# Patient Record
Sex: MALE | Race: OTHER | HISPANIC OR LATINO | Employment: OTHER | URBAN - METROPOLITAN AREA
[De-identification: names, ages, dates, MRNs, and addresses within clinical notes are randomized per-mention and may not be internally consistent; named-entity substitution may affect disease eponyms.]

---

## 2020-08-14 ENCOUNTER — APPOINTMENT (EMERGENCY)
Dept: RADIOLOGY | Facility: HOSPITAL | Age: 85
DRG: 378 | End: 2020-08-14
Payer: MEDICARE

## 2020-08-14 ENCOUNTER — APPOINTMENT (INPATIENT)
Dept: RADIOLOGY | Facility: HOSPITAL | Age: 85
DRG: 378 | End: 2020-08-14
Payer: MEDICARE

## 2020-08-14 ENCOUNTER — HOSPITAL ENCOUNTER (INPATIENT)
Facility: HOSPITAL | Age: 85
LOS: 5 days | Discharge: HOME | DRG: 378 | End: 2020-08-19
Attending: EMERGENCY MEDICINE | Admitting: INTERNAL MEDICINE
Payer: MEDICARE

## 2020-08-14 DIAGNOSIS — R53.83 FATIGUE: ICD-10-CM

## 2020-08-14 DIAGNOSIS — R53.1 WEAKNESS: ICD-10-CM

## 2020-08-14 DIAGNOSIS — R91.8 PULMONARY NODULES: ICD-10-CM

## 2020-08-14 DIAGNOSIS — D64.9 ANEMIA: Primary | ICD-10-CM

## 2020-08-14 DIAGNOSIS — K92.1 MELENA: ICD-10-CM

## 2020-08-14 DIAGNOSIS — K59.00 CONSTIPATION, UNSPECIFIED CONSTIPATION TYPE: ICD-10-CM

## 2020-08-14 DIAGNOSIS — I35.0 SEVERE AORTIC STENOSIS: ICD-10-CM

## 2020-08-14 DIAGNOSIS — N18.9 CKD (CHRONIC KIDNEY DISEASE): ICD-10-CM

## 2020-08-14 DIAGNOSIS — R63.4 WEIGHT LOSS: ICD-10-CM

## 2020-08-14 DIAGNOSIS — Z85.46 HISTORY OF PROSTATE CANCER: ICD-10-CM

## 2020-08-14 PROBLEM — Z98.890 HISTORY OF AAA (ABDOMINAL AORTIC ANEURYSM) REPAIR: Status: ACTIVE | Noted: 2020-08-14

## 2020-08-14 PROBLEM — R01.1 MURMUR, CARDIAC: Status: ACTIVE | Noted: 2020-08-14

## 2020-08-14 PROBLEM — E78.5 HLD (HYPERLIPIDEMIA): Status: ACTIVE | Noted: 2020-08-14

## 2020-08-14 PROBLEM — Z43.6 ATTENTION TO NEPHROSTOMY (HCC): Status: ACTIVE | Noted: 2020-08-14

## 2020-08-14 PROBLEM — I10 HTN (HYPERTENSION): Status: ACTIVE | Noted: 2020-08-14

## 2020-08-14 LAB
ABO GROUP BLD: NORMAL
ABO GROUP BLD: NORMAL
ALBUMIN SERPL BCP-MCNC: 3.2 G/DL (ref 3.5–5)
ALP SERPL-CCNC: 196 U/L (ref 46–116)
ALT SERPL W P-5'-P-CCNC: 14 U/L (ref 12–78)
ANION GAP SERPL CALCULATED.3IONS-SCNC: 4 MMOL/L (ref 4–13)
AST SERPL W P-5'-P-CCNC: 35 U/L (ref 5–45)
ATRIAL RATE: 66 BPM
BASOPHILS # BLD AUTO: 0.01 THOUSANDS/ΜL (ref 0–0.1)
BASOPHILS NFR BLD AUTO: 0 % (ref 0–1)
BILIRUB SERPL-MCNC: 0.13 MG/DL (ref 0.2–1)
BLD GP AB SCN SERPL QL: NEGATIVE
BUN SERPL-MCNC: 46 MG/DL (ref 5–25)
CALCIUM SERPL-MCNC: 9.6 MG/DL (ref 8.3–10.1)
CHLORIDE SERPL-SCNC: 110 MMOL/L (ref 100–108)
CK SERPL-CCNC: 73 U/L (ref 39–308)
CO2 SERPL-SCNC: 26 MMOL/L (ref 21–32)
CREAT SERPL-MCNC: 2.21 MG/DL (ref 0.6–1.3)
EOSINOPHIL # BLD AUTO: 0.3 THOUSAND/ΜL (ref 0–0.61)
EOSINOPHIL NFR BLD AUTO: 6 % (ref 0–6)
ERYTHROCYTE [DISTWIDTH] IN BLOOD BY AUTOMATED COUNT: 18.4 % (ref 11.6–15.1)
GFR SERPL CREATININE-BSD FRML MDRD: 26 ML/MIN/1.73SQ M
GLUCOSE SERPL-MCNC: 167 MG/DL (ref 65–140)
HCT VFR BLD AUTO: 19 % (ref 36.5–49.3)
HCT VFR BLD AUTO: 23.3 % (ref 36.5–49.3)
HGB BLD-MCNC: 5.1 G/DL (ref 12–17)
HGB BLD-MCNC: 7.1 G/DL (ref 12–17)
IMM GRANULOCYTES # BLD AUTO: 0.05 THOUSAND/UL (ref 0–0.2)
IMM GRANULOCYTES NFR BLD AUTO: 1 % (ref 0–2)
LYMPHOCYTES # BLD AUTO: 0.79 THOUSANDS/ΜL (ref 0.6–4.47)
LYMPHOCYTES NFR BLD AUTO: 15 % (ref 14–44)
MCH RBC QN AUTO: 19.2 PG (ref 26.8–34.3)
MCHC RBC AUTO-ENTMCNC: 26.8 G/DL (ref 31.4–37.4)
MCV RBC AUTO: 72 FL (ref 82–98)
MONOCYTES # BLD AUTO: 0.44 THOUSAND/ΜL (ref 0.17–1.22)
MONOCYTES NFR BLD AUTO: 9 % (ref 4–12)
NEUTROPHILS # BLD AUTO: 3.61 THOUSANDS/ΜL (ref 1.85–7.62)
NEUTS SEG NFR BLD AUTO: 69 % (ref 43–75)
NRBC BLD AUTO-RTO: 0 /100 WBCS
NT-PROBNP SERPL-MCNC: 1915 PG/ML
P AXIS: 76 DEGREES
PLATELET # BLD AUTO: 219 THOUSANDS/UL (ref 149–390)
PMV BLD AUTO: 9.5 FL (ref 8.9–12.7)
POTASSIUM SERPL-SCNC: 4.8 MMOL/L (ref 3.5–5.3)
PR INTERVAL: 158 MS
PROT SERPL-MCNC: 7.8 G/DL (ref 6.4–8.2)
QRS AXIS: -14 DEGREES
QRSD INTERVAL: 98 MS
QT INTERVAL: 386 MS
QTC INTERVAL: 404 MS
RBC # BLD AUTO: 2.65 MILLION/UL (ref 3.88–5.62)
RH BLD: POSITIVE
RH BLD: POSITIVE
SODIUM SERPL-SCNC: 140 MMOL/L (ref 136–145)
SPECIMEN EXPIRATION DATE: NORMAL
T WAVE AXIS: 62 DEGREES
TROPONIN I SERPL-MCNC: <0.02 NG/ML
TSH SERPL DL<=0.05 MIU/L-ACNC: 2.41 UIU/ML (ref 0.36–3.74)
VENTRICULAR RATE: 66 BPM
WBC # BLD AUTO: 5.2 THOUSAND/UL (ref 4.31–10.16)

## 2020-08-14 PROCEDURE — 30233N1 TRANSFUSION OF NONAUTOLOGOUS RED BLOOD CELLS INTO PERIPHERAL VEIN, PERCUTANEOUS APPROACH: ICD-10-PCS | Performed by: INTERNAL MEDICINE

## 2020-08-14 PROCEDURE — 82550 ASSAY OF CK (CPK): CPT | Performed by: EMERGENCY MEDICINE

## 2020-08-14 PROCEDURE — 84443 ASSAY THYROID STIM HORMONE: CPT | Performed by: EMERGENCY MEDICINE

## 2020-08-14 PROCEDURE — 84165 PROTEIN E-PHORESIS SERUM: CPT | Performed by: PATHOLOGY

## 2020-08-14 PROCEDURE — 99285 EMERGENCY DEPT VISIT HI MDM: CPT | Performed by: EMERGENCY MEDICINE

## 2020-08-14 PROCEDURE — 86900 BLOOD TYPING SEROLOGIC ABO: CPT | Performed by: EMERGENCY MEDICINE

## 2020-08-14 PROCEDURE — 83880 ASSAY OF NATRIURETIC PEPTIDE: CPT | Performed by: EMERGENCY MEDICINE

## 2020-08-14 PROCEDURE — 80053 COMPREHEN METABOLIC PANEL: CPT | Performed by: EMERGENCY MEDICINE

## 2020-08-14 PROCEDURE — P9016 RBC LEUKOCYTES REDUCED: HCPCS

## 2020-08-14 PROCEDURE — 74176 CT ABD & PELVIS W/O CONTRAST: CPT

## 2020-08-14 PROCEDURE — 84484 ASSAY OF TROPONIN QUANT: CPT | Performed by: EMERGENCY MEDICINE

## 2020-08-14 PROCEDURE — 86920 COMPATIBILITY TEST SPIN: CPT

## 2020-08-14 PROCEDURE — 86901 BLOOD TYPING SEROLOGIC RH(D): CPT | Performed by: EMERGENCY MEDICINE

## 2020-08-14 PROCEDURE — 36415 COLL VENOUS BLD VENIPUNCTURE: CPT | Performed by: EMERGENCY MEDICINE

## 2020-08-14 PROCEDURE — 85014 HEMATOCRIT: CPT | Performed by: INTERNAL MEDICINE

## 2020-08-14 PROCEDURE — 36430 TRANSFUSION BLD/BLD COMPNT: CPT

## 2020-08-14 PROCEDURE — 85025 COMPLETE CBC W/AUTO DIFF WBC: CPT | Performed by: EMERGENCY MEDICINE

## 2020-08-14 PROCEDURE — 85018 HEMOGLOBIN: CPT | Performed by: INTERNAL MEDICINE

## 2020-08-14 PROCEDURE — 93010 ELECTROCARDIOGRAM REPORT: CPT | Performed by: INTERNAL MEDICINE

## 2020-08-14 PROCEDURE — G1004 CDSM NDSC: HCPCS

## 2020-08-14 PROCEDURE — 99222 1ST HOSP IP/OBS MODERATE 55: CPT | Performed by: INTERNAL MEDICINE

## 2020-08-14 PROCEDURE — 71046 X-RAY EXAM CHEST 2 VIEWS: CPT

## 2020-08-14 PROCEDURE — 71250 CT THORAX DX C-: CPT

## 2020-08-14 PROCEDURE — 93005 ELECTROCARDIOGRAM TRACING: CPT

## 2020-08-14 PROCEDURE — 86850 RBC ANTIBODY SCREEN: CPT | Performed by: EMERGENCY MEDICINE

## 2020-08-14 PROCEDURE — 99285 EMERGENCY DEPT VISIT HI MDM: CPT

## 2020-08-14 RX ORDER — ATORVASTATIN CALCIUM 40 MG/1
40 TABLET, FILM COATED ORAL DAILY
COMMUNITY
End: 2020-09-23 | Stop reason: HOSPADM

## 2020-08-14 RX ORDER — BICALUTAMIDE 50 MG/1
50 TABLET, FILM COATED ORAL DAILY
Status: DISCONTINUED | OUTPATIENT
Start: 2020-08-15 | End: 2020-08-19 | Stop reason: HOSPADM

## 2020-08-14 RX ORDER — BICALUTAMIDE 50 MG/1
50 TABLET, FILM COATED ORAL DAILY
COMMUNITY

## 2020-08-14 RX ORDER — AMLODIPINE BESYLATE 5 MG/1
5 TABLET ORAL DAILY
COMMUNITY

## 2020-08-14 RX ORDER — CALCITRIOL 0.25 UG/1
0.25 CAPSULE, LIQUID FILLED ORAL DAILY
COMMUNITY

## 2020-08-14 RX ORDER — ACETAMINOPHEN 325 MG/1
650 TABLET ORAL EVERY 6 HOURS PRN
Status: DISCONTINUED | OUTPATIENT
Start: 2020-08-14 | End: 2020-08-19 | Stop reason: HOSPADM

## 2020-08-14 RX ORDER — FOLIC ACID/VIT B COMPLEX AND C 0.8 MG
0.8 TABLET ORAL
COMMUNITY

## 2020-08-14 RX ORDER — AMLODIPINE BESYLATE 5 MG/1
5 TABLET ORAL DAILY
Status: DISCONTINUED | OUTPATIENT
Start: 2020-08-15 | End: 2020-08-19 | Stop reason: HOSPADM

## 2020-08-14 RX ORDER — ATORVASTATIN CALCIUM 40 MG/1
40 TABLET, FILM COATED ORAL DAILY
Status: DISCONTINUED | OUTPATIENT
Start: 2020-08-15 | End: 2020-08-19 | Stop reason: HOSPADM

## 2020-08-14 RX ORDER — HEPARIN SODIUM 5000 [USP'U]/ML
5000 INJECTION, SOLUTION INTRAVENOUS; SUBCUTANEOUS EVERY 8 HOURS SCHEDULED
Status: DISCONTINUED | OUTPATIENT
Start: 2020-08-14 | End: 2020-08-14

## 2020-08-14 RX ORDER — DILTIAZEM HYDROCHLORIDE 120 MG/1
120 CAPSULE, COATED, EXTENDED RELEASE ORAL DAILY
COMMUNITY

## 2020-08-14 RX ORDER — DILTIAZEM HYDROCHLORIDE 120 MG/1
120 CAPSULE, COATED, EXTENDED RELEASE ORAL DAILY
Status: DISCONTINUED | OUTPATIENT
Start: 2020-08-15 | End: 2020-08-19 | Stop reason: HOSPADM

## 2020-08-14 RX ADMIN — ACETAMINOPHEN 650 MG: 325 TABLET, FILM COATED ORAL at 21:03

## 2020-08-14 NOTE — H&P
INTERNAL MEDICINE RESIDENCY ADMISSION H&P     Name: Iliana Barnard   Age & Sex: 80 y o  male   MRN: [id number]  Unit/Bed#: [facility] 913-01   Encounter: [id number]  Primary Care Provider: No primary care provider on file  Code Status: Level 3 - DNAR and DNI  Admission Status: INPATIENT   Disposition: Patient requires Med/Surg with Telemetry    Admit to team: SOD Team B     ASSESSMENT/PLAN     Principal Problem:    Anemia  Active Problems:    History of prostate cancer    Chronic kidney disease    Attention to nephrostomy (HCC)    HTN (hypertension)    HLD (hyperlipidemia)    Murmur, cardiac    History of AAA (abdominal aortic aneurysm) repair      History of AAA (abdominal aortic aneurysm) repair  Assessment & Plan  Patient endorsed a history of AAA repair 2 years ago  Will order CTA of the chest abdomen pelvis to evaluate if this is the cause for the anemia, the less likely as patient is hemodynamically stable currently  Continue to monitor    Murmur, cardiac  Assessment & Plan  Patient has cardiac murmur on exam, likely exacerbated by his dehydration, anemia  Will obtain echocardiogram to evaluate  Continue to monitor    HLD (hyperlipidemia)  Assessment & Plan  Stable  Continue Lipitor    HTN (hypertension)  Assessment & Plan  Stable  Continue amlodipine, diltiazem    Attention to nephrostomy [facility])  Assessment & Plan  Patient had nephrostomy tube placed 1 year ago for 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  Patient reports no difficulties with nephrostomy at this time, denies any hematuria  Patient does have urine output through the nephrostomy tube as well as normal urination through his penis at this time  If any issues with nephroscopy developed, would consider consulting Urology  Monitor urine output    Chronic kidney disease  Assessment & Plan  Patient has history of chronic kidney disease, unknown baseline creatinine at this time    Did stated history of temporary dialysis during prior hospitalization however no records are available at this time  Creatinine currently 2 21 in the setting of anemia  Continue to monitor creatinine, is receiving 2 units of PRBCs  Consider Nephrology consult as an outpatient    History of prostate cancer  Assessment & Plan  History of prostate cancer diagnosed 1 year ago, was following with Oncology at [facility]  No records are currently available at and patient is unsure on how to obtain them  Denies any prior history of chemotherapy, radiation  Had left nephrostomy tube placed due to obstructive uropathy  Chest x-ray suggests infiltrative disease probably secondary to his cancer, will obtain CTA of the chest abdomen pelvis to evaluate  Patient would like to establish care with Oncology in the area, will consult while he is inpatient for further recommendations  Follow up as an outpatient with Oncology    * Anemia  Assessment & Plan  Patient has severe symptomatic anemia on presentation, likely chronic in nature given his history of malignancy a possible infiltrative disease secondary to metastasis  Was transfused 2 times prior, last transfusion 3 months ago  No signs of overt bleeding  Hemoglobin on presentation 5 1  Will transfuse 2 units, recheck hemoglobin after transfusion  Will likely be a chronic issue for this patient given his history, we will consult Oncology for to establish care with patient as he recently relocated to the area  Transfuse if hemoglobin less than 7  Continue to monitor CBC          VTE Pharmacologic Prophylaxis:  Will hold DVT prophylaxis at this time, concern for anemia, can resume after patient has stable hemoglobin  VTE Mechanical Prophylaxis: sequential compression device    CHIEF COMPLAINT     Chief Complaint   Patient presents with    Weakness - Generalized     Per family pt has weakness with ambulation and is no longer able to stand for more than 30 seconds d/t feeling weak and dizzy   Pt has history of prostate cancer  Pt has had similar feeling in the past and needed a blood transfusion      HISTORY OF PRESENT ILLNESS     59-year-old male with past medical history of prostate cancer diagnosed 1 year ago, chronic kidney disease, status post nephrostomy tube on the left side for obstructive uropathy, chronic anemia, hypertension, hyperlipidemia was presented to [facility] for generalized weakness  He has been experiencing symptoms of generalized weakness, shortness of breath on exertion for the past 1 week  He states that this feels similar to her previous admission he had where he had a low hemoglobin level  He was transfused 2 times prior, last time was 3 months ago  He has had a left nephrostomy tube placed 1 year ago for obstructive uropathy, was on temporary dialysis  Does make urine which exits the of the nerve for left nephrostomy tube as well as his penis  Denies any recent hematuria, melena, hematochezia  No other signs of overt bleeding per history  On evaluation in the ED patient was hemodynamically stable, his hemoglobin is noted to be 5 1  He was started on a transfusion of 2 units of PRBCs  He has is admitted for symptomatic anemia  REVIEW OF SYSTEMS     Review of Systems   Constitutional: Positive for fatigue  Negative for chills and fever  HENT: Negative for congestion and hearing loss  Respiratory: Positive for shortness of breath  Negative for apnea and cough  Cardiovascular: Negative for chest pain, palpitations and leg swelling  Gastrointestinal: Negative for abdominal pain, blood in stool, constipation, diarrhea, nausea and vomiting  Genitourinary: Negative for dysuria and hematuria  Musculoskeletal: Negative for arthralgias and back pain  Neurological: Negative for dizziness and headaches  Psychiatric/Behavioral: Negative for agitation and confusion  All other systems reviewed were negative    OBJECTIVE     Vitals:    08/14/20 1346 08/14/20 1428 20 1430 20 1454   BP: 153/70 136/65 136/65 148/67   BP Location: Right arm   Right arm   Pulse: 60 58 60 58   Resp:    Temp:  (!) 96 6 °F (35 9 °C)  97 5 °F (36 4 °C)   TempSrc:    Oral   SpO2: 99%  100% 100%   Weight:          Temperature:   Temp (24hrs), Av 2 °F (36 2 °C), Min:96 6 °F (35 9 °C), Max:97 6 °F (36 4 °C)    Temperature: 97 5 °F (36 4 °C)  Intake & Output:  I/O     None        Weights:   IBW: -88 kg    There is no height or weight on file to calculate BMI  Weight (last 2 days)     Date/Time   Weight    20 1035   59 (130)            Physical Exam  Constitutional:       General: He is not in acute distress  Appearance: He is cachectic  He is ill-appearing  HENT:      Head: Normocephalic and atraumatic  Mouth/Throat:      Mouth: Mucous membranes are dry  Eyes:      Extraocular Movements: Extraocular movements intact  Conjunctiva/sclera: Conjunctivae normal       Pupils: Pupils are equal, round, and reactive to light  Cardiovascular:      Rate and Rhythm: Normal rate and regular rhythm  Pulses: Normal pulses  Heart sounds: Murmur present  No friction rub  No gallop  Pulmonary:      Effort: Pulmonary effort is normal  No respiratory distress  Breath sounds: Normal breath sounds  No wheezing or rales  Abdominal:      General: Abdomen is flat  Bowel sounds are normal  There is no distension  Palpations: Abdomen is soft  Tenderness: There is no abdominal tenderness  There is no guarding  Musculoskeletal:      Right lower leg: No edema  Left lower leg: No edema  Skin:     General: Skin is warm and dry  Neurological:      General: No focal deficit present  Mental Status: He is alert and oriented to person, place, and time     Psychiatric:         Mood and Affect: Mood normal          Behavior: Behavior normal         PAST MEDICAL HISTORY     Past Medical History:   Diagnosis Date    Kidney disease     Prostate cancer ([facility] [address] )      PAST SURGICAL HISTORY   History reviewed  No pertinent surgical history  SOCIAL & FAMILY HISTORY     Social History     Substance and Sexual Activity   Alcohol Use Never    Frequency: Never     Social History     Substance and Sexual Activity   Drug Use Never     Social History     Tobacco Use   Smoking Status Never Smoker   Smokeless Tobacco Never Used     History reviewed  No pertinent family history  LABORATORY DATA     Labs: I have personally reviewed pertinent reports  Results from last 7 days   Lab Units 08/14/20  1104   WBC Thousand/uL 5 20   HEMOGLOBIN g/dL 5 1*   HEMATOCRIT % 19 0*   PLATELETS Thousands/uL 219   NEUTROS PCT % 69   MONOS PCT % 9      Results from last 7 days   Lab Units 08/14/20  1104   POTASSIUM mmol/L 4 8   CHLORIDE mmol/L 110*   CO2 mmol/L 26   BUN mg/dL 46*   CREATININE mg/dL 2 21*   CALCIUM mg/dL 9 6   ALK PHOS U/L 196*   ALT U/L 14   AST U/L 35                      Results from last 7 days   Lab Units 08/14/20  1104   TROPONIN I ng/mL <0 02     Micro:  No results found for: Nela Speaks, WOUNDCULT, SPUTUMCULTUR  IMAGING & DIAGNOSTIC TESTS     Imaging: I have personally reviewed pertinent reports  Xr Chest 2 Views    Result Date: 8/14/2020  Impression: Numerous nodular density projecting over the lung fields, which could be pulmonary nodules or pleural plaques  There are also multiple suspected sclerotic bone lesions, for example in the inferior border of the right scapula  Recommend chest CT for further evaluation  The study was marked in EPIC for significant notification  Workstation performed: [id number]     EKG, Pathology, and Other Studies: I have personally reviewed pertinent reports  ALLERGIES   No Known Allergies  MEDICATIONS PRIOR TO ARRIVAL     Prior to Admission medications    Medication Sig Start Date End Date Taking?  Authorizing Provider   amLODIPine (NORVASC) 5 mg tablet Take 5 mg by mouth daily   Yes Historical Provider, MD atorvastatin (LIPITOR) 40 mg tablet Take 40 mg by mouth daily   Yes Historical Provider, MD moreno complex-C-folic acid (NEPHRO-YONIS) 0 8 mg tablet Take 0 8 mg by mouth daily with dinner   Yes Historical Provider, MD   bicalutamide (CASODEX) 50 mg tablet Take 50 mg by mouth daily   Yes Historical Provider, MD   calcitriol (ROCALTROL) 0 25 mcg capsule Take 0 25 mcg by mouth daily   Yes Historical Provider, MD   diltiazem (dilTIAZem CD) 120 mg 24 hr capsule Take 120 mg by mouth daily   Yes Historical Provider, MD     MEDICATIONS ADMINISTERED IN LAST 24 HOURS     CURRENT MEDICATIONS     Current Facility-Administered Medications   Medication Dose Route Frequency Provider Last Rate    [START ON 8/15/2020] amLODIPine  5 mg Oral Daily Robert G Chirayath, DO      [START ON 8/15/2020] atorvastatin  40 mg Oral Daily Robert G Chirayath, DO      [START ON 8/15/2020] bicalutamide  50 mg Oral Daily Robert G Chirayath, DO      [START ON 8/15/2020] diltiazem  120 mg Oral Daily Robert G Chirayath, DO      heparin (porcine)  5,000 Units Subcutaneous Q8H [facility] [address], DO               Admission Time  I spent 30 minutes admitting the patient  This involved direct patient contact where I performed a full history and physical, reviewing previous records, and reviewing laboratory and other diagnostic studies  Portions of the record may have been created with voice recognition software  Occasional wrong word or "sound a like" substitutions may have occurred due to the inherent limitations of voice recognition software    Read the chart carefully and recognize, using context, where substitutions have occurred     ==  Terence Malhotra, [facility]  Internal Medicine Residency PGY-2

## 2020-08-14 NOTE — ASSESSMENT & PLAN NOTE
Patient has severe symptomatic anemia on presentation, likely chronic in nature given his history of malignancy a possible infiltrative disease secondary to metastasis  Was transfused 2 times prior, last transfusion 3 months ago  No signs of overt bleeding  Hemoglobin on presentation 5 1  Will transfuse 2 units, recheck hemoglobin after transfusion  Will likely be a chronic issue for this patient given his history, we will consult Oncology for to establish care with patient as he recently relocated to the area    Transfuse if hemoglobin less than 7  Continue to monitor CBC

## 2020-08-14 NOTE — ASSESSMENT & PLAN NOTE
Patient has cardiac murmur on exam, likely exacerbated by his dehydration, anemia  Will obtain echocardiogram to evaluate  Continue to monitor

## 2020-08-14 NOTE — ASSESSMENT & PLAN NOTE
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

## 2020-08-14 NOTE — ASSESSMENT & PLAN NOTE
History of prostate cancer diagnosed 1 year ago, was following with Oncology at [facility]  No records are currently available at and patient is unsure on how to obtain them  Denies any prior history of chemotherapy, radiation  Had left nephrostomy tube placed due to obstructive uropathy  Chest x-ray suggests infiltrative disease probably secondary to his cancer, will obtain CTA of the chest abdomen pelvis to evaluate    Patient would like to establish care with Oncology in the area, will consult while he is inpatient for further recommendations  Follow up as an outpatient with Oncology

## 2020-08-14 NOTE — ED PROVIDER NOTES
History  Chief Complaint   Patient presents with    Weakness - Generalized     Per family pt has weakness with ambulation and is no longer able to stand for more than 30 seconds d/t feeling weak and dizzy  Pt has history of prostate cancer  Pt has had similar feeling in the past and needed a blood transfusion     80year-old male with history of prostate cancer with mets to bone, anemia previously requiring blood transfusion presenting for evaluation of generalized weakness over the past 2 weeks  Patient usually ambulates with a cane, however has had increasing difficulty even getting up to stand given weakness in his legs  He also feels increasingly fatigued  Endorses a significant amount of weight loss over the past 6 months, anywhere from 20-60 lb, he is unsure  Denies any chest pain, shortness of breath, cough, nausea, vomiting, diarrhea  Has no COVID-19 exposures, although did recently move here 2 weeks ago from [place]  Does not take any blood thinners  Denies any bleeding sources, no hematemesis or bloody stools  ROS otherwise negative as below  History provided by:  Patient   used: Yes        Prior to Admission Medications   Prescriptions Last Dose Informant Patient Reported? Taking?    amLODIPine (NORVASC) 5 mg tablet  Family Member Yes Yes   Sig: Take 5 mg by mouth daily   atorvastatin (LIPITOR) 40 mg tablet  Family Member Yes Yes   Sig: Take 40 mg by mouth daily   b complex-C-folic acid (NEPHRO-YONIS) 0 8 mg tablet  Family Member Yes Yes   Sig: Take 0 8 mg by mouth daily with dinner   bicalutamide (CASODEX) 50 mg tablet  Family Member Yes Yes   Sig: Take 50 mg by mouth daily   calcitriol (ROCALTROL) 0 25 mcg capsule  Family Member Yes Yes   Sig: Take 0 25 mcg by mouth daily   diltiazem (dilTIAZem CD) 120 mg 24 hr capsule  Family Member Yes Yes   Sig: Take 120 mg by mouth daily      Facility-Administered Medications: None       Past Medical History:   Diagnosis Date    Kidney disease     Prostate cancer [facility])        History reviewed  No pertinent surgical history  History reviewed  No pertinent family history  I have reviewed and agree with the history as documented  E-Cigarette/Vaping     E-Cigarette/Vaping Substances     Social History     Tobacco Use    Smoking status: Never Smoker    Smokeless tobacco: Never Used   Substance Use Topics    Alcohol use: Never     Frequency: Never    Drug use: Never        Review of Systems   Constitutional: Positive for fatigue and unexpected weight change  Negative for fever  HENT: Negative for congestion and rhinorrhea  Respiratory: Negative for cough, shortness of breath and wheezing  Cardiovascular: Negative for chest pain and palpitations  Gastrointestinal: Negative for diarrhea, nausea and vomiting  Genitourinary: Negative for dysuria and hematuria  Musculoskeletal: Positive for gait problem  Negative for back pain  Skin: Negative for pallor and rash  Neurological: Negative for light-headedness and headaches  Psychiatric/Behavioral: Negative for confusion  The patient is not nervous/anxious  Physical Exam  ED Triage Vitals   Temperature Pulse Respirations Blood Pressure SpO2   08/14/20 1035 08/14/20 1035 08/14/20 1035 08/14/20 1035 08/14/20 1035   97 6 °F (36 4 °C) 73 16 155/68 100 %      Temp src Heart Rate Source Patient Position - Orthostatic VS BP Location FiO2 (%)   -- 08/14/20 1125 08/14/20 1125 08/14/20 1125 --    Monitor Lying Right arm       Pain Score       08/14/20 1035       2             Orthostatic Vital Signs  Vitals:    08/14/20 1330 08/14/20 1346 08/14/20 1428 08/14/20 1430   BP: (!) 176/71 153/70 136/65 136/65   Pulse: 66 60 58 60   Patient Position - Orthostatic VS:  Lying         Physical Exam  Vitals signs and nursing note reviewed  Constitutional:       Appearance: He is not diaphoretic  Comments: Thin, frail appearing   HENT:      Head: Normocephalic        Right Ear: Tympanic membrane normal       Left Ear: Tympanic membrane normal       Nose: Nose normal       Mouth/Throat:      Mouth: Mucous membranes are moist    Eyes:      Comments: Conjunctival pallor   Neck:      Musculoskeletal: Normal range of motion  Cardiovascular:      Rate and Rhythm: Normal rate and regular rhythm  Heart sounds: Murmur (Systolic murmur) present  Pulmonary:      Effort: Pulmonary effort is normal       Breath sounds: Normal breath sounds  No wheezing or rales  Abdominal:      General: Abdomen is flat  There is no distension  Palpations: Abdomen is soft  Tenderness: There is no abdominal tenderness  Musculoskeletal:         General: No swelling  Skin:     General: Skin is warm  Coloration: Skin is pale  Neurological:      General: No focal deficit present  Mental Status: He is alert  Psychiatric:         Mood and Affect: Mood normal          ED Medications  Medications - No data to display    Diagnostic Studies  Results Reviewed     Procedure Component Value Units Date/Time    NT-BNP PRO [[id number]]  (Abnormal) Collected:  08/14/20 1104    Lab Status:  Final result Specimen:  Blood from Arm, Left Updated:  08/14/20 1149     NT-proBNP 1,915 pg/mL     TSH, 3rd generation with Free T4 reflex [[id number]]  (Normal) Collected:  08/14/20 1104    Lab Status:  Final result Specimen:  Blood from Arm, Left Updated:  08/14/20 1138     TSH 3RD GENERATON 2 410 uIU/mL     Narrative:       Patients undergoing fluorescein dye angiography may retain small amounts of fluorescein in the body for 48-72 hours post procedure  Samples containing fluorescein can produce falsely depressed TSH values  If the patient had this procedure,a specimen should be resubmitted post fluorescein clearance        Troponin I [[id number]]  (Normal) Collected:  08/14/20 1104    Lab Status:  Final result Specimen:  Blood from Arm, Left Updated:  08/14/20 1135     Troponin I <0 02 ng/mL     Comprehensive metabolic panel [[id number]]  (Abnormal) Collected:  08/14/20 1104    Lab Status:  Final result Specimen:  Blood from Arm, Left Updated:  08/14/20 1133     Sodium 140 mmol/L      Potassium 4 8 mmol/L      Chloride 110 mmol/L      CO2 26 mmol/L      ANION GAP 4 mmol/L      BUN 46 mg/dL      Creatinine 2 21 mg/dL      Glucose 167 mg/dL      Calcium 9 6 mg/dL      AST 35 U/L      ALT 14 U/L      Alkaline Phosphatase 196 U/L      Total Protein 7 8 g/dL      Albumin 3 2 g/dL      Total Bilirubin 0 13 mg/dL      eGFR 26 ml/min/1 73sq m     Narrative:       National [employer] guidelines for Chronic Kidney Disease (CKD):     Stage 1 with normal or high GFR (GFR > 90 mL/min/1 73 square meters)    Stage 2 Mild CKD (GFR = 60-89 mL/min/1 73 square meters)    Stage 3A Moderate CKD (GFR = 45-59 mL/min/1 73 square meters)    Stage 3B Moderate CKD (GFR = 30-44 mL/min/1 73 square meters)    Stage 4 Severe CKD (GFR = 15-29 mL/min/1 73 square meters)    Stage 5 End Stage CKD (GFR <15 mL/min/1 73 square meters)  Note: GFR calculation is accurate only with a steady state creatinine    CK (with reflex to MB) [[id number]]  (Normal) Collected:  08/14/20 1104    Lab Status:  Final result Specimen:  Blood from Arm, Left Updated:  08/14/20 1133     Total CK 73 U/L     CBC and differential [[id number]]  (Abnormal) Collected:  08/14/20 1104    Lab Status:  Final result Specimen:  Blood from Arm, Left Updated:  08/14/20 1128     WBC 5 20 Thousand/uL      RBC 2 65 Million/uL      Hemoglobin 5 1 g/dL      Hematocrit 19 0 %      MCV 72 fL      MCH 19 2 pg      MCHC 26 8 g/dL      RDW 18 4 %      MPV 9 5 fL      Platelets 562 Thousands/uL      nRBC 0 /100 WBCs      Neutrophils Relative 69 %      Immat GRANS % 1 %      Lymphocytes Relative 15 %      Monocytes Relative 9 %      Eosinophils Relative 6 %      Basophils Relative 0 %      Neutrophils Absolute 3 61 Thousands/µL      Immature Grans Absolute 0 05 Thousand/uL Lymphocytes Absolute 0 79 Thousands/µL      Monocytes Absolute 0 44 Thousand/µL      Eosinophils Absolute 0 30 Thousand/µL      Basophils Absolute 0 01 Thousands/µL                  XR chest 2 views   Final Result by Namrata Schumacher MD (08/14 1321)      Numerous nodular density projecting over the lung fields, which could be pulmonary nodules or pleural plaques  There are also multiple suspected sclerotic bone lesions, for example in the inferior border of the right scapula  Recommend chest CT for further evaluation  The study was marked in EPIC for significant notification  Workstation performed: [id number]               Procedures  ECG 12 Lead Documentation Only    Date/Time: 8/14/2020 2:05 PM  Performed by: Shari Anderson MD  Authorized by: Shari Anderson MD     Indications / Diagnosis:  Anemia  Patient location:  ED  Previous ECG:     Previous ECG:  Unavailable  Interpretation:     Interpretation: normal    Rate:     ECG rate:  66    ECG rate assessment: normal    Rhythm:     Rhythm: sinus rhythm    Ectopy:     Ectopy: none    QRS:     QRS axis:  Left  Conduction:     Conduction: normal    ST segments:     ST segments:  Normal  T waves:     T waves: normal    Comments:      Normal sinus, LAD  No prior ECGs available for comparison  ED Course  ED Course as of Aug 14 1434   Fri Aug 14, 2020   1130 Hemoglobin(!!): 5 1   1147 Creatinine(!): 2 21   1147 Consented for blood transfusion  Continues to be hemodynamically stable  1326 Ultrasound-guided left upper extremity 18 gauge placed                  Identification of Seniors at Risk      Most Recent Value   (ISAR) Identification of Seniors at Risk   Before the illness or injury that brought you to the Emergency, did you need someone to help you on a regular basis?   0 Filed at: 08/14/2020 1035   In the last 24 hours, have you needed more help than usual?  1 Filed at: 08/14/2020 1035   Have you been hospitalized for one or more nights during the past 6 months? 0 Filed at: 08/14/2020 1035   In general, do you see well?  0 Filed at: 08/14/2020 1035   In general, do you have serious problems with your memory? 0 Filed at: 08/14/2020 1035   Do you take more than three different medications every day? 1 Filed at: 08/14/2020 1035   ISAR Score  2 Filed at: 08/14/2020 1035                                  [facility]  Number of Diagnoses or Management Options  Anemia:   CKD (chronic kidney disease): Fatigue:   History of prostate cancer:   Pulmonary nodules:   Weakness:   Weight loss:   Diagnosis management comments: 59-year-old male with history of prostate cancer, left-sided nephrostomy to, anemia requiring blood transfusion presenting for weakness over the past 2 weeks as well as fatigue, weight loss  Hemodynamically stable  Hemoglobin of 5 1  Will require transfusion  Patient with difficult IV access, ultrasound-guided IV obtained in the left upper extremity  Otherwise, labs notable for creatinine of 2 21, unknown baseline  Troponin EKG on her unremarkable  Chest x-ray does show bilateral pulmonary nodules concerning for metastatic disease  Is unclear if patient has a history of such, both patient and daughter are unaware if patient have history of metastatic disease to lung, were or that he had metastatic disease to bone  Patient admitted to SOD, med surg with telemetry           Disposition  Final diagnoses:   Anemia   CKD (chronic kidney disease)   History of prostate cancer   Pulmonary nodules   Weakness   Fatigue   Weight loss     Time reflects when diagnosis was documented in both MDM as applicable and the Disposition within this note     Time User Action Codes Description Comment    8/14/2020  2:09 PM Geovany, Sarahi Gricelda Add [D64 9] Anemia     8/14/2020  2:09 PM Geovany, Sarahi Gricelda Add [N18 9] CKD (chronic kidney disease)     8/14/2020  2:09 PM Geovany, Sarahi Gricelda Add [Z85 46] History of prostate cancer     8/14/2020  2:09 PM Geovany, Sarahi Gricelda Add [R91 8] Pulmonary nodules     8/14/2020  2:09 PM Rivard, Jacobo Nyhan Add [R53 1] Weakness     8/14/2020  2:09 PM Geovany, Jacobo Nyhan Add [R53 83] Fatigue     8/14/2020  2:09 PM Geovany, Jacobo Nyhan Add [R63 4] Weight loss       ED Disposition     ED Disposition Condition Date/Time Comment    Admit Stable Fri Aug 14, 2020  2:08 PM Case was discussed with admitting resident and the patient's admission status was agreed to be Admission Status: inpatient status to the service of Dr Brenda Burleson   Follow-up Information    None         Patient's Medications   Discharge Prescriptions    No medications on file     No discharge procedures on file  PDMP Review     None           ED Provider  Attending physically available and evaluated Leena Coker I managed the patient along with the ED Attending      Electronically Signed by         Ginna Pires MD  08/14/20 8996

## 2020-08-14 NOTE — ED ATTENDING ATTESTATION
8/14/2020  IMarylou MD, saw and evaluated the patient  I have discussed the patient with the resident/non-physician practitioner and agree with the resident's/non-physician practitioner's findings, Plan of Care, and MDM as documented in the resident's/non-physician practitioner's note, except where noted  All available labs and Radiology studies were reviewed  I was present for key portions of any procedure(s) performed by the resident/non-physician practitioner and I was immediately available to provide assistance  At this point I agree with the current assessment done in the Emergency Department  I have conducted an independent evaluation of this patient a history and physical is as follows:    ED Course     80year-old male presenting to the emergency department for evaluation of generalized weakness  This is been progressive over the past 2 weeks  Daughter states that he has been less able to get up and walk around outside which is his usual   Patient has no focalizing symptoms, denying headache, chest pain, cough, abdominal pain, nausea, vomiting, diarrhea, black or bloody stools  Patient has a history of prostate cancer  Patient has a history of weight loss over the past year  Patient reports some intermittent shortness of breath  Ten systems reviewed negative except as noted in the history of present illness  On examination the patient appears slightly pale  The patient is resting comfortably on a stretcher in no acute respiratory distress  The patient appears nontoxic  HEENT reveals moist mucous membranes  Head is normocephalic and atraumatic  Conjunctiva and sclera are normal  Neck is nontender and supple with full range of motion to flexion, extension, lateral rotation  No meningismus appreciated  No masses are appreciated  Lungs are clear to auscultation bilaterally without any wheezes, rales or rhonchi   Heart is regular rate and rhythm without any murmurs, rubs or gallops  Abdomen is soft and nontender without any rebound or guarding  Extremities appear grossly normal without any significant arthropathy  Patient is awake, alert, and oriented x3  The patient has normal interaction  Motor is 5 out of 5  Assessment and plan:  59-year-old male presenting to the emergency department for evaluation of generalized weakness  Differential includes anemia, electrolyte disorder, gradual decline associated with cancer  Labs Reviewed   CBC AND DIFFERENTIAL - Abnormal       Result Value Ref Range Status    WBC 5 20  4  31 - 10 16 Thousand/uL Final    RBC 2 65 (*) 3 88 - 5 62 Million/uL Final    Hemoglobin 5 1 (*) 12 0 - 17 0 g/dL Final    Comment: Results verified by repeat  This result has been called to [facility] by Rosalind Ellis on 08 14 2020 at [phone], and has been read back  Hematocrit 19 0 (*) 36 5 - 49 3 % Final    MCV 72 (*) 82 - 98 fL Final    MCH 19 2 (*) 26 8 - 34 3 pg Final    MCHC 26 8 (*) 31 4 - 37 4 g/dL Final    RDW 18 4 (*) 11 6 - 15 1 % Final    MPV 9 5  8 9 - 12 7 fL Final    Platelets 592  930 - 390 Thousands/uL Final    nRBC 0  /100 WBCs Final    Neutrophils Relative 69  43 - 75 % Final    Immat GRANS % 1  0 - 2 % Final    Lymphocytes Relative 15  14 - 44 % Final    Monocytes Relative 9  4 - 12 % Final    Eosinophils Relative 6  0 - 6 % Final    Basophils Relative 0  0 - 1 % Final    Neutrophils Absolute 3 61  1 85 - 7 62 Thousands/µL Final    Immature Grans Absolute 0 05  0 00 - 0 20 Thousand/uL Final    Lymphocytes Absolute 0 79  0 60 - 4 47 Thousands/µL Final    Monocytes Absolute 0 44  0 17 - 1 22 Thousand/µL Final    Eosinophils Absolute 0 30  0 00 - 0 61 Thousand/µL Final    Basophils Absolute 0 01  0 00 - 0 10 Thousands/µL Final   COMPREHENSIVE METABOLIC PANEL - Abnormal    Sodium 140  136 - 145 mmol/L Final    Potassium 4 8  3 5 - 5 3 mmol/L Final    Comment: Slightly Hemolyzed;  Results May be Affected    Chloride 110 (*) 100 - 108 mmol/L Final    CO2 26  21 - 32 mmol/L Final    ANION GAP 4  4 - 13 mmol/L Final    BUN 46 (*) 5 - 25 mg/dL Final    Creatinine 2 21 (*) 0 60 - 1 30 mg/dL Final    Comment: Standardized to IDMS reference method    Glucose 167 (*) 65 - 140 mg/dL Final    Comment: If the patient is fasting, the ADA then defines impaired fasting glucose as > 100 mg/dL and diabetes as > or equal to 123 mg/dL  Specimen collection should occur prior to Sulfasalazine administration due to the potential for falsely depressed results  Specimen collection should occur prior to Sulfapyridine administration due to the potential for falsely elevated results  Calcium 9 6  8 3 - 10 1 mg/dL Final    AST 35  5 - 45 U/L Final    Comment: Slightly Hemolyzed; Results May be Affected  Specimen collection should occur prior to Sulfasalazine administration due to the potential for falsely depressed results  ALT 14  12 - 78 U/L Final    Comment: Specimen collection should occur prior to Sulfasalazine and/or Sulfapyridine administration due to the potential for falsely depressed results  Alkaline Phosphatase 196 (*) 46 - 116 U/L Final    Total Protein 7 8  6 4 - 8 2 g/dL Final    Albumin 3 2 (*) 3 5 - 5 0 g/dL Final    Total Bilirubin 0 13 (*) 0 20 - 1 00 mg/dL Final    Comment: Use of this assay is not recommended for patients undergoing treatment with eltrombopag due to the potential for falsely elevated results      eGFR 26  ml/min/1 73sq m Final    Narrative:     [place] guidelines for Chronic Kidney Disease (CKD):     Stage 1 with normal or high GFR (GFR > 90 mL/min/1 73 square meters)    Stage 2 Mild CKD (GFR = 60-89 mL/min/1 73 square meters)    Stage 3A Moderate CKD (GFR = 45-59 mL/min/1 73 square meters)    Stage 3B Moderate CKD (GFR = 30-44 mL/min/1 73 square meters)    Stage 4 Severe CKD (GFR = 15-29 mL/min/1 73 square meters)    Stage 5 End Stage CKD (GFR <15 mL/min/1 73 square meters)  Note: GFR calculation is accurate only with a steady state creatinine   NT-BNP PRO (BRAIN NATRIURETIC PEPTIDE) - Abnormal    NT-proBNP 1,915 (*) <450 pg/mL Final   TSH, 3RD GENERATION WITH FREE T4 REFLEX - Normal    TSH 3RD GENERATON 2 410  0 358 - 3 740 uIU/mL Final    Comment: Using supplements with high doses of biotin 20 to more than 300 times greater than the adequate daily intake for adults of 30 mcg/day as established by the [place] [employer], can cause falsely depress results  Narrative:     Patients undergoing fluorescein dye angiography may retain small amounts of fluorescein in the body for 48-72 hours post procedure  Samples containing fluorescein can produce falsely depressed TSH values  If the patient had this procedure,a specimen should be resubmitted post fluorescein clearance  CK - Normal    Total CK 73  39 - 308 U/L Final   TROPONIN I - Normal    Troponin I <0 02  <=0 04 ng/mL Final    Comment: Siemens Chemistry analyzer 99% cutoff is > 0 04 ng/mL in network labs     o cTnI 99% cutoff is useful only when applied to patients in the clinical setting of myocardial ischemia   o cTnI 99% cutoff should be interpreted in the context of clinical history, ECG findings and possibly cardiac imaging to establish correct diagnosis  o cTnI 99% cutoff may be suggestive but clearly not indicative of a coronary event without the clinical setting of myocardial ischemia       PREPARE LEUKOREDUCED RBC    Unit Product Code A2023910   Final    Unit Number [id number]   Final    Unit ABO B   Final    Unit [facility] POS   Final    Crossmatch Compatible   Final    Unit Dispense Status Crossmatched   Final    Unit Product Code M2468L46       Unit Number [id number]       Unit ABO B       Unit RH POS       Crossmatch Compatible   Final    Unit Dispense Status Issued      [address] RECHECK    ABO Grouping B   Final    Rh Factor Positive   Final   TYPE AND SCREEN    ABO Grouping B   Final    Rh Factor Positive   Final    Antibody Screen Negative   Final    Specimen Expiration Date [id number]   Final       XR chest 2 views   Final Result      Numerous nodular density projecting over the lung fields, which could be pulmonary nodules or pleural plaques  There are also multiple suspected sclerotic bone lesions, for example in the inferior border of the right scapula  Recommend chest CT for further evaluation  The study was marked in EPIC for significant notification  Workstation performed: [id number]           Likely metastatic prostate cancer  Patient is anemic  Patient will be admitted        Critical Care Time  Procedures

## 2020-08-14 NOTE — ASSESSMENT & PLAN NOTE
Patient has history of chronic kidney disease, unknown baseline creatinine at this time  Did stated history of temporary dialysis during prior hospitalization however no records are available at this time    Creatinine currently 2 21 in the setting of anemia  Continue to monitor creatinine, is receiving 2 units of PRBCs  Consider Nephrology consult as an outpatient

## 2020-08-14 NOTE — CONSULTS
Consultation - Cynthia Stout 80 y o  male MRN: [id number]    Unit/Bed#: [facility] 913-01 Encounter: [id number]      Assessment/Plan     Assessment:  In summary, this is an 43-year-old male history of reported prostate cancer with bone metastases  PSA is requested  Iron studies, SPEP, free light chains requested  Etiology of his anemia is not clear  Bone marrow replacement seems unlikely given an absence of leuko erythro blastic findings  He denies any melena or hematochezia  He denies hematuria  CKD certainly could be contributing  Imaging is anticipated to better understand anatomic abnormalities  Records are requested regarding his prior care  Palliative care consultation could be considered for symptom management  I reviewed the above considerations with the patient and his daughter  They voiced understanding and agreement  Plan:  See above  History of Present Illness      HPI: Cynthia Stout is a 80y o  year old male who presents with Generalized weakness  Patient states that he has had a diagnosis of prostate cancer with bone metastases for 12-18 months  He has been treated in [place] at [facility] in [facility]  Details of his history are unavailable at this time  He states that he has been on oral medication for his prostate cancer  Additionally he has been receiving a monthly injection but this had been held due to insurance limitations  Over the past 6 months or so he has lost 30 or 40 lb by his report  He has had progressive functional decline prompting his move from [place] to [place] to be with family members  WBC 5 2, hemoglobin 5 1, MCV 72, platelet count 659, normal differential   CMP shows creatinine 2 2, glucose 167, alk-phos 196, total protein 7 8, albumin 3 2, bilirubin 0 13   TSH normal     Consults    Review of Systems   Constitutional: Positive for appetite change, fatigue and unexpected weight change (30-40 lb wt loss over past 12-18 mos  )  Negative for chills and fever  HENT: Negative for nosebleeds  Eyes: Negative for discharge  Respiratory: Negative for cough and shortness of breath  Cardiovascular: Negative for chest pain  Gastrointestinal: Negative for abdominal pain, constipation and diarrhea  Endocrine: Negative for polydipsia  Genitourinary: Negative for hematuria  Musculoskeletal: Negative for arthralgias  Skin: Negative for color change  Allergic/Immunologic: Negative for immunocompromised state  Neurological: Positive for weakness  Negative for dizziness and headaches  Hematological: Negative for adenopathy  Psychiatric/Behavioral: Negative for agitation  Historical Information   Past Medical History:   Diagnosis Date    Kidney disease     Prostate cancer ([facility] [address] )      History reviewed  No pertinent surgical history  Social History   Social History     Substance and Sexual Activity   Alcohol Use Never    Frequency: Never     Social History     Substance and Sexual Activity   Drug Use Never     Social History     Tobacco Use   Smoking Status Never Smoker   Smokeless Tobacco Never Used     E-Cigarette/Vaping     E-Cigarette/Vaping Substances      Family History: History reviewed  No pertinent family history  Meds/Allergies   all current active meds have been reviewed  No Known Allergies    Objective   Vitals: Blood pressure 150/61, pulse 58, temperature (!) 96 5 °F (35 8 °C), resp  rate 18, weight 59 kg (130 lb), SpO2 100 %  Intake/Output Summary (Last 24 hours) at 8/14/2020 1812  Last data filed at 8/14/2020 1607  Gross per 24 hour   Intake 350 ml   Output 325 ml   Net 25 ml     Invasive Devices     Peripheral Intravenous Line            Peripheral IV Left Antecubital -- days    Peripheral IV 08/14/20 Left Antecubital less than 1 day          Drain            Nephrostomy Left -- days                Physical Exam  Constitutional:       Appearance: He is well-developed  HENT:      Head: Normocephalic  Eyes:      Pupils: Pupils are equal, round, and reactive to light  Neck:      Musculoskeletal: Normal range of motion  Cardiovascular:      Rate and Rhythm: Regular rhythm  Pulmonary:      Breath sounds: Normal breath sounds  Abdominal:      Palpations: Abdomen is soft  Lymphadenopathy:      Cervical: No cervical adenopathy  Skin:     General: Skin is warm  Neurological:      Mental Status: He is alert  Lab Results: I have personally reviewed pertinent reports  Imaging Studies: I have personally reviewed pertinent reports  EKG, Pathology, and Other Studies: I have personally reviewed pertinent reports  Code Status: Level 3 - DNAR and DNI  Advance Directive and Living Will:      Power of :    POLST:      Counseling / Coordination of Care  Total floor / unit time spent today 40 minutes  Greater than 50% of total time was spent with the patient and / or family counseling and / or coordination of care   A description of the counseling / coordination of care:  See note

## 2020-08-14 NOTE — ASSESSMENT & PLAN NOTE
Patient endorsed a history of AAA repair 2 years ago  Will order CTA of the chest abdomen pelvis to evaluate if this is the cause for the anemia, the less likely as patient is hemodynamically stable currently    Continue to monitor

## 2020-08-15 LAB
ABO GROUP BLD BPU: NORMAL
ABO GROUP BLD BPU: NORMAL
ANION GAP SERPL CALCULATED.3IONS-SCNC: 4 MMOL/L (ref 4–13)
BASOPHILS # BLD AUTO: 0.02 THOUSANDS/ΜL (ref 0–0.1)
BASOPHILS NFR BLD AUTO: 0 % (ref 0–1)
BPU ID: NORMAL
BPU ID: NORMAL
BUN SERPL-MCNC: 41 MG/DL (ref 5–25)
CALCIUM SERPL-MCNC: 9.9 MG/DL (ref 8.3–10.1)
CHLORIDE SERPL-SCNC: 113 MMOL/L (ref 100–108)
CO2 SERPL-SCNC: 25 MMOL/L (ref 21–32)
CREAT SERPL-MCNC: 1.89 MG/DL (ref 0.6–1.3)
CROSSMATCH: NORMAL
CROSSMATCH: NORMAL
EOSINOPHIL # BLD AUTO: 0.46 THOUSAND/ΜL (ref 0–0.61)
EOSINOPHIL NFR BLD AUTO: 7 % (ref 0–6)
ERYTHROCYTE [DISTWIDTH] IN BLOOD BY AUTOMATED COUNT: 20 % (ref 11.6–15.1)
ERYTHROCYTE [DISTWIDTH] IN BLOOD BY AUTOMATED COUNT: 20.2 % (ref 11.6–15.1)
FERRITIN SERPL-MCNC: 40 NG/ML (ref 8–388)
FOLATE SERPL-MCNC: >20 NG/ML (ref 3.1–17.5)
GFR SERPL CREATININE-BSD FRML MDRD: 31 ML/MIN/1.73SQ M
GLUCOSE SERPL-MCNC: 80 MG/DL (ref 65–140)
HCT VFR BLD AUTO: 24 % (ref 36.5–49.3)
HCT VFR BLD AUTO: 27.2 % (ref 36.5–49.3)
HEMOCCULT STL QL: POSITIVE
HGB BLD-MCNC: 7.2 G/DL (ref 12–17)
HGB BLD-MCNC: 7.9 G/DL (ref 12–17)
IMM GRANULOCYTES # BLD AUTO: 0.04 THOUSAND/UL (ref 0–0.2)
IMM GRANULOCYTES NFR BLD AUTO: 1 % (ref 0–2)
IRON SATN MFR SERPL: 73 %
IRON SERPL-MCNC: 218 UG/DL (ref 65–175)
LYMPHOCYTES # BLD AUTO: 1.13 THOUSANDS/ΜL (ref 0.6–4.47)
LYMPHOCYTES NFR BLD AUTO: 17 % (ref 14–44)
MAGNESIUM SERPL-MCNC: 2.2 MG/DL (ref 1.6–2.6)
MCH RBC QN AUTO: 22.3 PG (ref 26.8–34.3)
MCH RBC QN AUTO: 22.6 PG (ref 26.8–34.3)
MCHC RBC AUTO-ENTMCNC: 29 G/DL (ref 31.4–37.4)
MCHC RBC AUTO-ENTMCNC: 30 G/DL (ref 31.4–37.4)
MCV RBC AUTO: 75 FL (ref 82–98)
MCV RBC AUTO: 77 FL (ref 82–98)
MONOCYTES # BLD AUTO: 0.71 THOUSAND/ΜL (ref 0.17–1.22)
MONOCYTES NFR BLD AUTO: 11 % (ref 4–12)
NEUTROPHILS # BLD AUTO: 4.24 THOUSANDS/ΜL (ref 1.85–7.62)
NEUTS SEG NFR BLD AUTO: 64 % (ref 43–75)
NRBC BLD AUTO-RTO: 0 /100 WBCS
PHOSPHATE SERPL-MCNC: 3.1 MG/DL (ref 2.3–4.1)
PLATELET # BLD AUTO: 212 THOUSANDS/UL (ref 149–390)
PLATELET # BLD AUTO: 216 THOUSANDS/UL (ref 149–390)
PMV BLD AUTO: 9.6 FL (ref 8.9–12.7)
PMV BLD AUTO: 9.7 FL (ref 8.9–12.7)
POTASSIUM SERPL-SCNC: 4.6 MMOL/L (ref 3.5–5.3)
PSA SERPL-MCNC: 730.6 NG/ML (ref 0–4)
RBC # BLD AUTO: 3.19 MILLION/UL (ref 3.88–5.62)
RBC # BLD AUTO: 3.55 MILLION/UL (ref 3.88–5.62)
SODIUM SERPL-SCNC: 142 MMOL/L (ref 136–145)
TIBC SERPL-MCNC: 299 UG/DL (ref 250–450)
UNIT DISPENSE STATUS: NORMAL
UNIT DISPENSE STATUS: NORMAL
UNIT PRODUCT CODE: NORMAL
UNIT PRODUCT CODE: NORMAL
UNIT RH: NORMAL
UNIT RH: NORMAL
VIT B12 SERPL-MCNC: 428 PG/ML (ref 100–900)
WBC # BLD AUTO: 6.33 THOUSAND/UL (ref 4.31–10.16)
WBC # BLD AUTO: 6.6 THOUSAND/UL (ref 4.31–10.16)

## 2020-08-15 PROCEDURE — 82728 ASSAY OF FERRITIN: CPT | Performed by: INTERNAL MEDICINE

## 2020-08-15 PROCEDURE — 83540 ASSAY OF IRON: CPT | Performed by: INTERNAL MEDICINE

## 2020-08-15 PROCEDURE — 83735 ASSAY OF MAGNESIUM: CPT | Performed by: STUDENT IN AN ORGANIZED HEALTH CARE EDUCATION/TRAINING PROGRAM

## 2020-08-15 PROCEDURE — 83550 IRON BINDING TEST: CPT | Performed by: INTERNAL MEDICINE

## 2020-08-15 PROCEDURE — 82272 OCCULT BLD FECES 1-3 TESTS: CPT | Performed by: INTERNAL MEDICINE

## 2020-08-15 PROCEDURE — 84100 ASSAY OF PHOSPHORUS: CPT | Performed by: STUDENT IN AN ORGANIZED HEALTH CARE EDUCATION/TRAINING PROGRAM

## 2020-08-15 PROCEDURE — 83883 ASSAY NEPHELOMETRY NOT SPEC: CPT | Performed by: INTERNAL MEDICINE

## 2020-08-15 PROCEDURE — 82746 ASSAY OF FOLIC ACID SERUM: CPT | Performed by: INTERNAL MEDICINE

## 2020-08-15 PROCEDURE — 80048 BASIC METABOLIC PNL TOTAL CA: CPT | Performed by: STUDENT IN AN ORGANIZED HEALTH CARE EDUCATION/TRAINING PROGRAM

## 2020-08-15 PROCEDURE — 82607 VITAMIN B-12: CPT | Performed by: INTERNAL MEDICINE

## 2020-08-15 PROCEDURE — G0103 PSA SCREENING: HCPCS | Performed by: INTERNAL MEDICINE

## 2020-08-15 PROCEDURE — 84165 PROTEIN E-PHORESIS SERUM: CPT | Performed by: INTERNAL MEDICINE

## 2020-08-15 PROCEDURE — 85027 COMPLETE CBC AUTOMATED: CPT | Performed by: STUDENT IN AN ORGANIZED HEALTH CARE EDUCATION/TRAINING PROGRAM

## 2020-08-15 PROCEDURE — 85025 COMPLETE CBC W/AUTO DIFF WBC: CPT | Performed by: INTERNAL MEDICINE

## 2020-08-15 RX ORDER — LANOLIN ALCOHOL/MO/W.PET/CERES
3 CREAM (GRAM) TOPICAL
Status: DISCONTINUED | OUTPATIENT
Start: 2020-08-15 | End: 2020-08-19 | Stop reason: HOSPADM

## 2020-08-15 RX ADMIN — AMLODIPINE BESYLATE 5 MG: 5 TABLET ORAL at 09:59

## 2020-08-15 RX ADMIN — ACETAMINOPHEN 650 MG: 325 TABLET, FILM COATED ORAL at 06:22

## 2020-08-15 RX ADMIN — DILTIAZEM HYDROCHLORIDE 120 MG: 120 CAPSULE, COATED, EXTENDED RELEASE ORAL at 10:00

## 2020-08-15 RX ADMIN — MELATONIN 3 MG: at 21:13

## 2020-08-15 RX ADMIN — ATORVASTATIN CALCIUM 40 MG: 40 TABLET, FILM COATED ORAL at 09:59

## 2020-08-15 RX ADMIN — ACETAMINOPHEN 650 MG: 325 TABLET, FILM COATED ORAL at 21:13

## 2020-08-15 RX ADMIN — BICALUTAMIDE 50 MG: 50 TABLET, FILM COATED ORAL at 10:00

## 2020-08-15 NOTE — ASSESSMENT & PLAN NOTE
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

## 2020-08-15 NOTE — ASSESSMENT & PLAN NOTE
Patient has history of AAA 2 years ago s/p EVAR with known endoleak per [facility] records   Patient with no abdominal or back pain at this time  - CT CAP 8/14 with 10 2 cm AAA s/p EVAR with no signs of rupture  - Consider this as possible cause of anemia  - Monitor symptoms

## 2020-08-15 NOTE — PLAN OF CARE
Problem: SAFETY ADULT  Goal: Patient will remain free of falls  Description: INTERVENTIONS:  - Assess patient frequently for physical needs  -  Identify cognitive and physical deficits and behaviors that affect risk of falls  -  [place] fall precautions as indicated by assessment   - Educate patient/family on patient safety including physical limitations  - Instruct patient to call for assistance with activity based on assessment  - Modify environment to reduce risk of injury  - Consider OT/PT consult to assist with strengthening/mobility  Outcome: Progressing     Problem: Potential for Falls  Goal: Patient will remain free of falls  Description: INTERVENTIONS:  - Assess patient frequently for physical needs  -  Identify cognitive and physical deficits and behaviors that affect risk of falls    -  [place] fall precautions as indicated by assessment   - Educate patient/family on patient safety including physical limitations  - Instruct patient to call for assistance with activity based on assessment  - Modify environment to reduce risk of injury  - Consider OT/PT consult to assist with strengthening/mobility  Outcome: Progressing

## 2020-08-15 NOTE — PLAN OF CARE
Problem: Nutrition/Hydration-ADULT  Goal: Nutrient/Hydration intake appropriate for improving, restoring or maintaining nutritional needs  Description: Monitor and assess patient's nutrition/hydration status for malnutrition  Collaborate with interdisciplinary team and initiate plan and interventions as ordered  Monitor patient's weight and dietary intake as ordered or per policy  Utilize nutrition screening tool and intervene as necessary  Determine patient's food preferences and provide high-protein, high-caloric foods as appropriate       INTERVENTIONS:  - Monitor oral intake, urinary output, labs, and treatment plans  - Assess nutrition and hydration status and recommend course of action  - Evaluate amount of meals eaten  - Assist patient with eating if necessary   - Allow adequate time for meals  - Recommend/ encourage appropriate diets, oral nutritional supplements, and vitamin/mineral supplements  - Order, calculate, and assess calorie counts as needed  - Recommend, monitor, and adjust tube feedings and TPN/PPN based on assessed needs  - Assess need for intravenous fluids  - Provide specific nutrition/hydration education as appropriate  - Include patient/family/caregiver in decisions related to nutrition  Outcome: Progressing     Problem: PAIN - ADULT  Goal: Verbalizes/displays adequate comfort level or baseline comfort level  Description: Interventions:  - Encourage patient to monitor pain and request assistance  - Assess pain using appropriate pain scale  - Administer analgesics based on type and severity of pain and evaluate response  - Implement non-pharmacological measures as appropriate and evaluate response  - Consider cultural and social influences on pain and pain management  - Notify physician/advanced practitioner if interventions unsuccessful or patient reports new pain  Outcome: Progressing     Problem: SAFETY ADULT  Goal: Patient will remain free of falls  Description: INTERVENTIONS:  - Assess patient frequently for physical needs  -  Identify cognitive and physical deficits and behaviors that affect risk of falls    -  [place] fall precautions as indicated by assessment   - Educate patient/family on patient safety including physical limitations  - Instruct patient to call for assistance with activity based on assessment  - Modify environment to reduce risk of injury  - Consider OT/PT consult to assist with strengthening/mobility  Outcome: Progressing  Goal: Maintain or return to baseline ADL function  Description: INTERVENTIONS:  -  Assess patient's ability to carry out ADLs; assess patient's baseline for ADL function and identify physical deficits which impact ability to perform ADLs (bathing, care of mouth/teeth, toileting, grooming, dressing, etc )  - Assess/evaluate cause of self-care deficits   - Assess range of motion  - Assess patient's mobility; develop plan if impaired  - Assess patient's need for assistive devices and provide as appropriate  - Encourage maximum independence but intervene and supervise when necessary  - Involve family in performance of ADLs  - Assess for home care needs following discharge   - Consider OT consult to assist with ADL evaluation and planning for discharge  - Provide patient education as appropriate  Outcome: Progressing  Goal: Maintain or return mobility status to optimal level  Description: INTERVENTIONS:  - Assess patient's baseline mobility status (ambulation, transfers, stairs, etc )    - Identify cognitive and physical deficits and behaviors that affect mobility  - Identify mobility aids required to assist with transfers and/or ambulation (gait belt, sit-to-stand, lift, walker, cane, etc )  - [place] fall precautions as indicated by assessment  - Record patient progress and toleration of activity level on Mobility SBAR; progress patient to next Phase/Stage  - Instruct patient to call for assistance with activity based on assessment  - Consider rehabilitation consult to assist with strengthening/weightbearing, etc   Outcome: Progressing

## 2020-08-15 NOTE — PROGRESS NOTES
INTERNAL MEDICINE RESIDENCY PROGRESS NOTE     Name: Consuela Leyden   Age & Sex: 80 y o  male   MRN: [id number]  Unit/Bed#: [facility] 913-01   Encounter: [id number]  Team: SOD Team B     PATIENT INFORMATION     Name: Consuela Leyden   Age & Sex: 80 y o  male   MRN: [address] [place] Stay Days: 1    ASSESSMENT/PLAN     Principal Problem:    Anemia  Active Problems:    History of prostate cancer    Chronic kidney disease    Attention to nephrostomy (HCC)    HTN (hypertension)    HLD (hyperlipidemia)    Murmur, cardiac    History of AAA (abdominal aortic aneurysm) repair      * Anemia  Assessment & Plan  · Patient has severe symptomatic anemia on presentation, likely chronic in nature given his history of malignancy a possible infiltrative disease secondary to metastasis  Was transfused 2 times prior, last transfusion 3 months ago  · No signs of overt bleeding  · Hemoglobin on presentation 5 1  · Hb 7 2 post 2U PRBC  · Will likely be a chronic issue for this patient given his history, we consulted Oncology for to establish care with patient as he recently relocated to the area  · Transfuse if hemoglobin less than 7  · If Hb stable tomorrow can be discharged with outpatient follow-up      History of AAA (abdominal aortic aneurysm) repair  Assessment & Plan  · Patient endorsed a history of AAA repair 2 years ago    HLD (hyperlipidemia)  Assessment & Plan  · Stable  · Continue Lipitor    HTN (hypertension)  Assessment & Plan  · Stable  · Continue amlodipine, diltiazem    Attention to nephrostomy [facility])  Assessment & Plan  · Patient had nephrostomy tube placed 1 year ago for 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  · Patient reports no difficulties with nephrostomy at this time, denies any hematuria    · Patient does have urine output through the nephrostomy tube as well as normal urination through his penis at this time  · If any issues with nephroscopy developed, would consider consulting Urology  · Monitor urine output    Chronic kidney disease  Assessment & Plan  · Patient has history of chronic kidney disease, unknown baseline creatinine at this time  Did stated history of temporary dialysis during prior hospitalization however no records are available at this time  · Creatinine currently 2 21 in the setting of anemia  · Continue to monitor creatinine, is receiving 2 units of PRBCs  · Consider Nephrology consult as an outpatient    History of prostate cancer  Assessment & Plan  · History of prostate cancer diagnosed 1 year ago, was following with Oncology at [facility]  · No records are currently available at and patient is unsure on how to obtain them  · Denies any prior history of chemotherapy, radiation  Had left nephrostomy tube placed due to obstructive uropathy  · Chest x-ray suggests infiltrative disease probably secondary to his cancer, will obtain CTA of the chest abdomen pelvis to evaluate  · Patient would like to establish care with Oncology in the area, will consult while he is inpatient for further recommendations  · Follow up as an outpatient with Oncology      Disposition: Continue inpatient care, discharge with outpatient follow up if hemoglobin is stable tomorrow    SUBJECTIVE     Patient seen and examined  No acute events overnight  Patient denies chest pain, palpitations, SOB, cough, abdominal pain, nausea, vomiting, constipation, diarrhea, fevers/chills, headaches, dysuria      OBJECTIVE     Vitals:    20 2247 08/15/20 0250 08/15/20 0650 08/15/20 0956   BP: (!) 135/48 96/80 136/51 139/89   BP Location: Right arm Left arm     Pulse: 62 55 (!) 54 (!) 53   Resp: 20 18 16    Temp: 98 5 °F (36 9 °C) 98 7 °F (37 1 °C) 98 5 °F (36 9 °C)    TempSrc: Oral Oral     SpO2: 98% 96% 96% 100%   Weight: 59 kg (130 lb 1 1 oz)      Height: 5' 8" (1 727 m)         Temperature:   Temp (24hrs), Av 9 °F (36 6 °C), Min:96 5 °F (35 8 °C), Max:98 7 °F (37 1 °C)    Temperature: 98 5 °F (36 9 °C)  Intake & Output:  I/O       08/13 0701 - 08/14 0700 08/14 0701 - 08/15 0700 08/15 0701 - 08/16 0700    P  O   360 180    Blood  700     Total Intake(mL/kg)  1060 (18) 180 (3 1)    Urine (mL/kg/hr)  1275 175 (0 8)    Total Output  1275 175    Net  -215 +5               Weights:   IBW: 68 4 kg    Body mass index is 19 78 kg/m²  Weight (last 2 days)     Date/Time   Weight    08/14/20 22:47:19   59 (130 07)    08/14/20 1035   59 (130)            Physical Exam:   GENERAL: NAD  HEENT:  NC/AT, MMM, no scleral icterus  CARDIAC:  RRR, +S1/S2, no S3/S4 heard, no m/g/r  PULMONARY:  CTA B/L, no wheezing/rales/rhonci, non-labored breathing  ABDOMEN:  Soft, NT/ND, +BS, no rebound/guarding/rigidity  Extremities:  2+ Pulses in DP/PT  No edema, cyanosis, or clubbing  NEUROLOGIC:  Alert/oriented x3  SKIN:  No rashes or erythema    LABORATORY DATA     Labs: I have personally reviewed pertinent reports  Results from last 7 days   Lab Units 08/15/20  0444 08/14/20 2239 08/14/20  1104   WBC Thousand/uL 6 33  --  5 20   HEMOGLOBIN g/dL 7 2* 7 1* 5 1*   HEMATOCRIT % 24 0* 23 3* 19 0*   PLATELETS Thousands/uL 212  --  219   NEUTROS PCT %  --   --  69   MONOS PCT %  --   --  9      Results from last 7 days   Lab Units 08/15/20  0444 08/14/20  1104   POTASSIUM mmol/L 4 6 4 8   CHLORIDE mmol/L 113* 110*   CO2 mmol/L 25 26   BUN mg/dL 41* 46*   CREATININE mg/dL 1 89* 2 21*   CALCIUM mg/dL 9 9 9 6   ALK PHOS U/L  --  196*   ALT U/L  --  14   AST U/L  --  35     Results from last 7 days   Lab Units 08/15/20  0444   MAGNESIUM mg/dL 2 2     Results from last 7 days   Lab Units 08/15/20  0444   PHOSPHORUS mg/dL 3 1              Results from last 7 days   Lab Units 08/14/20  1104   TROPONIN I ng/mL <0 02       IMAGING & DIAGNOSTIC TESTING     Radiology Results: I have personally reviewed pertinent reports    Ct Chest Abdomen Pelvis Wo Contrast    Result Date: 8/14/2020  Impression: Enlarged, irregular contoured prostate gland in keeping with history of prostate cancer, possibly extending into the bladder (series 2 images 67 through 74 ) Diffuse predominantly sclerotic but also some lytic bone metastases throughout the visualized skeleton  Mild right-sided hydroureteronephrosis  There is an abdominal aortic aneurysm status post bifurcated endovascular stent grafting  There is no evidence of rupture  The aneurysm sac is quite large at 10 2 cm  Possibility endoleak cannot be excluded without IV contrast  Severe emphysema  There are also 2 ground glass density pulmonary nodules, larger a 23 mm right upper lobe nodule  Based on current Fleischner Society 2017 Guidelines on incidental pulmonary nodule, followup noncontrast CT is recommended at 6-12 months from the initial examination to confirm persistence; if stable at that time, additional followup CT is recommended for every 2 years until 5 years of stability is demonstrated  Workstation performed: [id number]     Xr Chest 2 Views    Result Date: 8/14/2020  Impression: Numerous nodular density projecting over the lung fields, which could be pulmonary nodules or pleural plaques  There are also multiple suspected sclerotic bone lesions, for example in the inferior border of the right scapula  Recommend chest CT for further evaluation  The study was marked in EPIC for significant notification  Workstation performed: [id number]     Other Diagnostic Testing: I have personally reviewed pertinent reports  ACTIVE MEDICATIONS     Current Facility-Administered Medications   Medication Dose Route Frequency    acetaminophen (TYLENOL) tablet 650 mg  650 mg Oral Q6H PRN    amLODIPine (NORVASC) tablet 5 mg  5 mg Oral Daily    atorvastatin (LIPITOR) tablet 40 mg  40 mg Oral Daily    bicalutamide (CASODEX) tablet 50 mg  50 mg Oral Daily    diltiazem (CARDIZEM CD) 24 hr capsule 120 mg  120 mg Oral Daily         Portions of the record may have been created with voice recognition software    Occasional wrong word or "sound a like" substitutions may have occurred due to the inherent limitations of voice recognition software    Read the chart carefully and recognize, using context, where substitutions have occurred   ==  Haylee Graves MD  [address]  Internal Medicine Residency PGY-3

## 2020-08-15 NOTE — ASSESSMENT & PLAN NOTE
· Patient has history of chronic kidney disease, unknown baseline creatinine at this time  Did stated history of temporary dialysis during prior hospitalization however no records are available at this time    · Creatinine currently 2 21 in the setting of anemia  · Continue to monitor creatinine, is receiving 2 units of PRBCs  · Consider Nephrology consult as an outpatient

## 2020-08-15 NOTE — ASSESSMENT & PLAN NOTE
Patient had severe symptomatic anemia on presentation with Hb 5 1 and microcytic RBCs  Pt given 3 u pRBCs since admission, now with Hb 7 8    Patient complained of melena on 8/17, stating his stools were intermittently black  Rectal exam showed brown stools in the rectal vault  FOBT was positive  Given severe aortic stenosis, Heyde's syndrome was considered, however likely ruled out due to elevated von Willebrand factor activity    - Consulted gastroenterology as patient has never had an EGD/colonoscopy in the past, given elevated BUN, concern for upper GI source, Heyde's?  - EGD 8/19 with small sliding hiatal hernia, otherwise normal  - Colonoscopy 8/19 aborted due to large amount of liquid stool in rectosigmoid colon  - Will obtain colonoscopy outpatient in 4 to 6 weeks  - Oncology consulted   SPEP negative for monoclonal bands   - INR 1 16, PTT 24  - vWF elevated at 221  - Hemoglobin checks q12h  - PPI IV bid  - Transfuse if hemoglobin less than 7

## 2020-08-16 ENCOUNTER — APPOINTMENT (INPATIENT)
Dept: NON INVASIVE DIAGNOSTICS | Facility: HOSPITAL | Age: 85
DRG: 378 | End: 2020-08-16
Payer: MEDICARE

## 2020-08-16 LAB
ANION GAP SERPL CALCULATED.3IONS-SCNC: 4 MMOL/L (ref 4–13)
BASOPHILS # BLD AUTO: 0.01 THOUSANDS/ΜL (ref 0–0.1)
BASOPHILS NFR BLD AUTO: 0 % (ref 0–1)
BUN SERPL-MCNC: 42 MG/DL (ref 5–25)
CALCIUM SERPL-MCNC: 9.6 MG/DL (ref 8.3–10.1)
CHLORIDE SERPL-SCNC: 114 MMOL/L (ref 100–108)
CO2 SERPL-SCNC: 26 MMOL/L (ref 21–32)
CREAT SERPL-MCNC: 1.93 MG/DL (ref 0.6–1.3)
EOSINOPHIL # BLD AUTO: 0.45 THOUSAND/ΜL (ref 0–0.61)
EOSINOPHIL NFR BLD AUTO: 8 % (ref 0–6)
ERYTHROCYTE [DISTWIDTH] IN BLOOD BY AUTOMATED COUNT: 21 % (ref 11.6–15.1)
GFR SERPL CREATININE-BSD FRML MDRD: 31 ML/MIN/1.73SQ M
GLUCOSE SERPL-MCNC: 80 MG/DL (ref 65–140)
HCT VFR BLD AUTO: 22.9 % (ref 36.5–49.3)
HCT VFR BLD AUTO: 29.6 % (ref 36.5–49.3)
HGB BLD-MCNC: 6.7 G/DL (ref 12–17)
HGB BLD-MCNC: 9.1 G/DL (ref 12–17)
IMM GRANULOCYTES # BLD AUTO: 0.03 THOUSAND/UL (ref 0–0.2)
IMM GRANULOCYTES NFR BLD AUTO: 1 % (ref 0–2)
LYMPHOCYTES # BLD AUTO: 1.07 THOUSANDS/ΜL (ref 0.6–4.47)
LYMPHOCYTES NFR BLD AUTO: 20 % (ref 14–44)
MCH RBC QN AUTO: 22.5 PG (ref 26.8–34.3)
MCHC RBC AUTO-ENTMCNC: 29.3 G/DL (ref 31.4–37.4)
MCV RBC AUTO: 77 FL (ref 82–98)
MONOCYTES # BLD AUTO: 0.6 THOUSAND/ΜL (ref 0.17–1.22)
MONOCYTES NFR BLD AUTO: 11 % (ref 4–12)
NEUTROPHILS # BLD AUTO: 3.28 THOUSANDS/ΜL (ref 1.85–7.62)
NEUTS SEG NFR BLD AUTO: 60 % (ref 43–75)
NRBC BLD AUTO-RTO: 0 /100 WBCS
PLATELET # BLD AUTO: 202 THOUSANDS/UL (ref 149–390)
PMV BLD AUTO: 9.8 FL (ref 8.9–12.7)
POTASSIUM SERPL-SCNC: 4.4 MMOL/L (ref 3.5–5.3)
RBC # BLD AUTO: 2.98 MILLION/UL (ref 3.88–5.62)
SODIUM SERPL-SCNC: 144 MMOL/L (ref 136–145)
WBC # BLD AUTO: 5.71 THOUSAND/UL (ref 4.31–10.16)

## 2020-08-16 PROCEDURE — 93306 TTE W/DOPPLER COMPLETE: CPT

## 2020-08-16 PROCEDURE — 85014 HEMATOCRIT: CPT | Performed by: INTERNAL MEDICINE

## 2020-08-16 PROCEDURE — 85018 HEMOGLOBIN: CPT | Performed by: INTERNAL MEDICINE

## 2020-08-16 PROCEDURE — P9016 RBC LEUKOCYTES REDUCED: HCPCS

## 2020-08-16 PROCEDURE — 80048 BASIC METABOLIC PNL TOTAL CA: CPT | Performed by: INTERNAL MEDICINE

## 2020-08-16 PROCEDURE — 85025 COMPLETE CBC W/AUTO DIFF WBC: CPT | Performed by: INTERNAL MEDICINE

## 2020-08-16 PROCEDURE — 93306 TTE W/DOPPLER COMPLETE: CPT | Performed by: INTERNAL MEDICINE

## 2020-08-16 RX ADMIN — ATORVASTATIN CALCIUM 40 MG: 40 TABLET, FILM COATED ORAL at 11:18

## 2020-08-16 RX ADMIN — MELATONIN 3 MG: at 21:39

## 2020-08-16 RX ADMIN — ACETAMINOPHEN 650 MG: 325 TABLET, FILM COATED ORAL at 21:39

## 2020-08-16 RX ADMIN — AMLODIPINE BESYLATE 5 MG: 5 TABLET ORAL at 11:19

## 2020-08-16 RX ADMIN — BICALUTAMIDE 50 MG: 50 TABLET, FILM COATED ORAL at 11:19

## 2020-08-16 RX ADMIN — DILTIAZEM HYDROCHLORIDE 120 MG: 120 CAPSULE, COATED, EXTENDED RELEASE ORAL at 11:21

## 2020-08-16 NOTE — NUTRITION
08/16/20 1155   Recommendations/Interventions   Nutrition Recommendations Continue diet as ordered  (added chocolate ensure at lunch daily)

## 2020-08-16 NOTE — MALNUTRITION/BMI
This medical record reflects one or more clinical indicators suggestive of malnutrition and/or morbid obesity  Malnutrition Findings:   Malnutrition type: Chronic illness  Degree of Malnutrition: Malnutrition of moderate degree(18% wt loss x1 year, <75% energy intake compared to estimated energy needs for > 1 month, temples- slight depression  treatment= diet education/supplement)  Malnutrition Characteristics: Muscle loss, Weight loss    BMI Findings: Body mass index is 19 78 kg/m²  See Nutrition note dated 8/16/20 for additional details  Completed nutrition assessment is viewable in the nutrition documentation

## 2020-08-16 NOTE — UTILIZATION REVIEW
Notification of Inpatient Admission/Inpatient Authorization Request   This is a Notification of Inpatient Admission for [address]  Be advised that this patient was admitted to our facility under Inpatient Status  Contact Ronald White at [phone] for additional admission information  [facility] DEPT  DEDICATED -[phone]  Patient Name:   Rebeca Lyons   YOB: 1934       [address]   [address]:   [facility]  Tax ID: [id number]  NPI: [id number] Attending Provider/NPI:  Phone:  Address: Brice Litten, [address] [[id number]]  [phone]  Same as ROSELINE/Rami[address] Nam [address] of Service Code: 24 [facility]   Start Date: 8/14/20 1409 Discharge Date & Time: No discharge date for patient encounter  Type of Admission: Inpatient Status Discharge Disposition (if discharged): Final discharge disposition not confirmed   Patient Diagnoses: Fatigue [R53 83]  Weakness [R53 1]  Anemia [D64 9]  Weight loss [R63 4]  Pulmonary nodules [R91 8]  CKD (chronic kidney disease) [N18 9]  History of prostate cancer [Z85 46]     Orders: Admission Orders (From admission, onward)     Ordered        08/14/20 1409  Inpatient Admission  Once                    Assigned Utilization Review Contact: Ronald White  Utilization   Network Utilization Review Department  Phone: [phone]; Fax [phone]  Email: [email or web address]   ATTENTION PAYERS: Please call the assigned Utilization  directly with any questions or concerns ALL voicemails in the department are confidential  Send all requests for admission clinical reviews, approved or denied determinations and any other requests to dedicated fax number belonging to the campus where the patient is receiving treatment

## 2020-08-16 NOTE — PROGRESS NOTES
IM Residency Progress Note   Unit/Bed#: [facility] 913-01 Encounter: [id number]  SOD Team B       Chery Garrido 80 y o  male [facility] Stay Days: 2      Assessment/Plan:    Principal Problem:    Anemia  Active Problems:    History of prostate cancer    Chronic kidney disease    Attention to nephrostomy (HCC)    HTN (hypertension)    HLD (hyperlipidemia)    Murmur, cardiac    History of AAA (abdominal aortic aneurysm) repair    * Anemia  Assessment & Plan  · Patient has severe symptomatic anemia on presentation, likely chronic in nature given his history of malignancy a possible infiltrative disease secondary to metastasis  Was transfused 2 times prior, last transfusion 3 months ago  · No signs of overt bleeding  · Hemoglobin on presentation 5 1  · Hb 7 2 post 2U PRBC, 6 7 today with plan to transfuse again  · Will likely be a chronic issue for this patient given his history, we consulted Oncology for to establish care with patient as he recently relocated to the area  Continue to discuss patients anemia with oncology  · Transfuse if hemoglobin less than 7        History of AAA (abdominal aortic aneurysm) repair  Assessment & Plan  · Patient endorsed a history of AAA repair 2 years ago     HLD (hyperlipidemia)  Assessment & Plan  · Stable  · Continue Lipitor     HTN (hypertension)  Assessment & Plan  · Stable  · Continue amlodipine, diltiazem     Attention to nephrostomy [facility])  Assessment & Plan  · Patient had nephrostomy tube placed 1 year ago for 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  · Patient reports no difficulties with nephrostomy at this time, denies any hematuria    · Patient does have urine output through the nephrostomy tube as well as normal urination through his penis at this time  · If any issues with nephroscopy developed, would consider consulting Urology  · Monitor urine output     Chronic kidney disease  Assessment & Plan  · Patient has history of chronic kidney disease, unknown baseline creatinine at this time  Did stated history of temporary dialysis during prior hospitalization however no records are available at this time  · Continue to monitor creatinine, is receiving 2 units of PRBCs  · Consider Nephrology consult as an outpatient     History of prostate cancer  Assessment & Plan  · History of prostate cancer diagnosed 1 year ago, was following with Oncology at [facility]  · No records are currently available at and patient is unsure on how to obtain them  · Denies any prior history of chemotherapy, radiation  Had left nephrostomy tube placed due to obstructive uropathy  · Chest x-ray suggests infiltrative disease probably secondary to his cancer, will obtain CTA of the chest abdomen pelvis to evaluate  · Patient would like to establish care with Oncology in the area, will consult while he is inpatient for further recommendations  · Follow up as an outpatient with Oncology    Disposition:  Continue inpatient management      Subjective:   No acute events overnight per patient or per nursing  Patient denies any acute complaints at this time  He denies any noted bleeding  No symptoms of anemia  Vitals: Temp (24hrs), Av 6 °F (37 °C), Min:97 8 °F (36 6 °C), Max:99 3 °F (37 4 °C)  Current: Temperature: 98 2 °F (36 8 °C)  Vitals:    20 1133 20 1152 20 1154 20 1225   BP: 153/53   151/53   BP Location:       Pulse: 62   61   Resp: 16      Temp: 98 2 °F (36 8 °C)      TempSrc:       SpO2: 99%   98%   Weight:  59 kg (130 lb 1 1 oz)     Height:  5' 8" (1 727 m) 5' 8" (1 727 m)     Body mass index is 19 78 kg/m²  I/O last 24 hours:   In: 760 [P O :760]  Out: 1325 [Urine:1325]      Physical Exam:     GENERAL: NAD  HEENT:  NC/AT, MMM, no scleral icterus  CARDIAC:  RRR, +S1/S2, no S3/S4 heard, no m/g/r  PULMONARY:  CTA B/L, no wheezing/rales/rhonci, non-labored breathing  ABDOMEN:  Soft, NT/ND, +BS, no rebound/guarding/rigidity  Extremities:  2+ Pulses in DP/PT  No edema, cyanosis, or clubbing  NEUROLOGIC:  Alert/oriented x3     SKIN:  No rashes or erythema      Invasive Devices     Peripheral Intravenous Line            Peripheral IV 08/14/20 Left Antecubital 2 days    Peripheral IV 08/14/20 Left Antecubital 1 day          Drain            Nephrostomy Left -- days                          Labs:   Recent Results (from the past 24 hour(s))   CBC and differential    Collection Time: 08/15/20  4:19 PM   Result Value Ref Range    WBC 6 60 4 31 - 10 16 Thousand/uL    RBC 3 55 (L) 3 88 - 5 62 Million/uL    Hemoglobin 7 9 (L) 12 0 - 17 0 g/dL    Hematocrit 27 2 (L) 36 5 - 49 3 %    MCV 77 (L) 82 - 98 fL    MCH 22 3 (L) 26 8 - 34 3 pg    MCHC 29 0 (L) 31 4 - 37 4 g/dL    RDW 20 2 (H) 11 6 - 15 1 %    MPV 9 6 8 9 - 12 7 fL    Platelets 642 096 - 657 Thousands/uL    nRBC 0 /100 WBCs    Neutrophils Relative 64 43 - 75 %    Immat GRANS % 1 0 - 2 %    Lymphocytes Relative 17 14 - 44 %    Monocytes Relative 11 4 - 12 %    Eosinophils Relative 7 (H) 0 - 6 %    Basophils Relative 0 0 - 1 %    Neutrophils Absolute 4 24 1 85 - 7 62 Thousands/µL    Immature Grans Absolute 0 04 0 00 - 0 20 Thousand/uL    Lymphocytes Absolute 1 13 0 60 - 4 47 Thousands/µL    Monocytes Absolute 0 71 0 17 - 1 22 Thousand/µL    Eosinophils Absolute 0 46 0 00 - 0 61 Thousand/µL    Basophils Absolute 0 02 0 00 - 0 10 Thousands/µL   CBC and differential    Collection Time: 08/16/20  6:07 AM   Result Value Ref Range    WBC 5 71 4 31 - 10 16 Thousand/uL    RBC 2 98 (L) 3 88 - 5 62 Million/uL    Hemoglobin 6 7 (LL) 12 0 - 17 0 g/dL    Hematocrit 22 9 (L) 36 5 - 49 3 %    MCV 77 (L) 82 - 98 fL    MCH 22 5 (L) 26 8 - 34 3 pg    MCHC 29 3 (L) 31 4 - 37 4 g/dL    RDW 21 0 (H) 11 6 - 15 1 %    MPV 9 8 8 9 - 12 7 fL    Platelets 636 452 - 641 Thousands/uL    nRBC 0 /100 WBCs    Neutrophils Relative 60 43 - 75 %    Immat GRANS % 1 0 - 2 %    Lymphocytes Relative 20 14 - 44 %    Monocytes Relative 11 4 - 12 %    Eosinophils Relative 8 (H) 0 - 6 %    Basophils Relative 0 0 - 1 %    Neutrophils Absolute 3 28 1 85 - 7 62 Thousands/µL    Immature Grans Absolute 0 03 0 00 - 0 20 Thousand/uL    Lymphocytes Absolute 1 07 0 60 - 4 47 Thousands/µL    Monocytes Absolute 0 60 0 17 - 1 22 Thousand/µL    Eosinophils Absolute 0 45 0 00 - 0 61 Thousand/µL    Basophils Absolute 0 01 0 00 - 0 10 Thousands/µL   Basic metabolic panel    Collection Time: 08/16/20  6:07 AM   Result Value Ref Range    Sodium 144 136 - 145 mmol/L    Potassium 4 4 3 5 - 5 3 mmol/L    Chloride 114 (H) 100 - 108 mmol/L    CO2 26 21 - 32 mmol/L    ANION GAP 4 4 - 13 mmol/L    BUN 42 (H) 5 - 25 mg/dL    Creatinine 1 93 (H) 0 60 - 1 30 mg/dL    Glucose 80 65 - 140 mg/dL    Calcium 9 6 8 3 - 10 1 mg/dL    eGFR 31 ml/min/1 73sq m   Prepare Leukoreduced RBC: 1 Units    Collection Time: 08/16/20 10:54 AM   Result Value Ref Range    Unit Product Code N8363F83     Unit Number [id number]     Unit ABO B     Unit RH POS     Crossmatch Compatible     Unit Dispense Status Issued        Radiology Results: I have personally reviewed pertinent reports  Other Diagnostic Testing:   I have personally reviewed pertinent reports          Active Meds:   Current Facility-Administered Medications   Medication Dose Route Frequency    acetaminophen (TYLENOL) tablet 650 mg  650 mg Oral Q6H PRN    amLODIPine (NORVASC) tablet 5 mg  5 mg Oral Daily    atorvastatin (LIPITOR) tablet 40 mg  40 mg Oral Daily    bicalutamide (CASODEX) tablet 50 mg  50 mg Oral Daily    diltiazem (CARDIZEM CD) 24 hr capsule 120 mg  120 mg Oral Daily    melatonin tablet 3 mg  3 mg Oral HS         VTE Pharmacologic Prophylaxis: Sequential compression device (Venodyne)   VTE Mechanical Prophylaxis: sequential compression device    Bethel Billings MD

## 2020-08-17 PROBLEM — I35.0 SEVERE AORTIC STENOSIS: Status: ACTIVE | Noted: 2020-08-17

## 2020-08-17 PROBLEM — K59.00 CONSTIPATION: Status: ACTIVE | Noted: 2020-08-17

## 2020-08-17 PROBLEM — K92.1 MELENA: Status: ACTIVE | Noted: 2020-08-17

## 2020-08-17 PROBLEM — E44.0 MODERATE PROTEIN-CALORIE MALNUTRITION (HCC): Status: ACTIVE | Noted: 2020-08-17

## 2020-08-17 LAB
ABO GROUP BLD BPU: NORMAL
ALBUMIN SERPL ELPH-MCNC: 2.56 G/DL (ref 3.5–5)
ALBUMIN SERPL ELPH-MCNC: 46.5 % (ref 52–65)
ALPHA1 GLOB SERPL ELPH-MCNC: 0.42 G/DL (ref 0.1–0.4)
ALPHA1 GLOB SERPL ELPH-MCNC: 7.6 % (ref 2.5–5)
ALPHA2 GLOB SERPL ELPH-MCNC: 0.79 G/DL (ref 0.4–1.2)
ALPHA2 GLOB SERPL ELPH-MCNC: 14.3 % (ref 7–13)
ANION GAP SERPL CALCULATED.3IONS-SCNC: 5 MMOL/L (ref 4–13)
APTT PPP: 24 SECONDS (ref 23–37)
BETA GLOB ABNORMAL SERPL ELPH-MCNC: 0.37 G/DL (ref 0.4–0.8)
BETA1 GLOB SERPL ELPH-MCNC: 6.7 % (ref 5–13)
BETA2 GLOB SERPL ELPH-MCNC: 5.7 % (ref 2–8)
BETA2+GAMMA GLOB SERPL ELPH-MCNC: 0.31 G/DL (ref 0.2–0.5)
BPU ID: NORMAL
BUN SERPL-MCNC: 39 MG/DL (ref 5–25)
CALCIUM SERPL-MCNC: 10 MG/DL (ref 8.3–10.1)
CHLORIDE SERPL-SCNC: 110 MMOL/L (ref 100–108)
CO2 SERPL-SCNC: 26 MMOL/L (ref 21–32)
CREAT SERPL-MCNC: 1.69 MG/DL (ref 0.6–1.3)
CROSSMATCH: NORMAL
ERYTHROCYTE [DISTWIDTH] IN BLOOD BY AUTOMATED COUNT: 21.2 % (ref 11.6–15.1)
GAMMA GLOB ABNORMAL SERPL ELPH-MCNC: 1.06 G/DL (ref 0.5–1.6)
GAMMA GLOB SERPL ELPH-MCNC: 19.2 % (ref 12–22)
GFR SERPL CREATININE-BSD FRML MDRD: 36 ML/MIN/1.73SQ M
GLUCOSE SERPL-MCNC: 87 MG/DL (ref 65–140)
HCT VFR BLD AUTO: 25.3 % (ref 36.5–49.3)
HCT VFR BLD AUTO: 27.9 % (ref 36.5–49.3)
HGB BLD-MCNC: 7.7 G/DL (ref 12–17)
HGB BLD-MCNC: 8.4 G/DL (ref 12–17)
IGG/ALB SER: 0.87 {RATIO} (ref 1.1–1.8)
INR PPP: 1.13 (ref 0.84–1.19)
MCH RBC QN AUTO: 23.3 PG (ref 26.8–34.3)
MCHC RBC AUTO-ENTMCNC: 30.4 G/DL (ref 31.4–37.4)
MCV RBC AUTO: 77 FL (ref 82–98)
PLATELET # BLD AUTO: 196 THOUSANDS/UL (ref 149–390)
PMV BLD AUTO: 9.4 FL (ref 8.9–12.7)
POTASSIUM SERPL-SCNC: 4.3 MMOL/L (ref 3.5–5.3)
PROT PATTERN SERPL ELPH-IMP: ABNORMAL
PROT SERPL-MCNC: 7 G/DL (ref 6.4–8.2)
PROTHROMBIN TIME: 14.5 SECONDS (ref 11.6–14.5)
RBC # BLD AUTO: 3.3 MILLION/UL (ref 3.88–5.62)
SARS-COV-2 RNA RESP QL NAA+PROBE: NEGATIVE
SODIUM SERPL-SCNC: 141 MMOL/L (ref 136–145)
UNIT DISPENSE STATUS: NORMAL
UNIT PRODUCT CODE: NORMAL
UNIT RH: NORMAL
WBC # BLD AUTO: 6.45 THOUSAND/UL (ref 4.31–10.16)

## 2020-08-17 PROCEDURE — 99223 1ST HOSP IP/OBS HIGH 75: CPT | Performed by: INTERNAL MEDICINE

## 2020-08-17 PROCEDURE — 85730 THROMBOPLASTIN TIME PARTIAL: CPT | Performed by: INTERNAL MEDICINE

## 2020-08-17 PROCEDURE — C9113 INJ PANTOPRAZOLE SODIUM, VIA: HCPCS | Performed by: INTERNAL MEDICINE

## 2020-08-17 PROCEDURE — 85014 HEMATOCRIT: CPT | Performed by: INTERNAL MEDICINE

## 2020-08-17 PROCEDURE — 99232 SBSQ HOSP IP/OBS MODERATE 35: CPT | Performed by: INTERNAL MEDICINE

## 2020-08-17 PROCEDURE — 97167 OT EVAL HIGH COMPLEX 60 MIN: CPT

## 2020-08-17 PROCEDURE — 85245 CLOT FACTOR VIII VW RISTOCTN: CPT | Performed by: INTERNAL MEDICINE

## 2020-08-17 PROCEDURE — 97163 PT EVAL HIGH COMPLEX 45 MIN: CPT

## 2020-08-17 PROCEDURE — 85018 HEMOGLOBIN: CPT | Performed by: INTERNAL MEDICINE

## 2020-08-17 PROCEDURE — U0003 INFECTIOUS AGENT DETECTION BY NUCLEIC ACID (DNA OR RNA); SEVERE ACUTE RESPIRATORY SYNDROME CORONAVIRUS 2 (SARS-COV-2) (CORONAVIRUS DISEASE [COVID-19]), AMPLIFIED PROBE TECHNIQUE, MAKING USE OF HIGH THROUGHPUT TECHNOLOGIES AS DESCRIBED BY CMS-2020-01-R: HCPCS | Performed by: STUDENT IN AN ORGANIZED HEALTH CARE EDUCATION/TRAINING PROGRAM

## 2020-08-17 PROCEDURE — 85610 PROTHROMBIN TIME: CPT | Performed by: INTERNAL MEDICINE

## 2020-08-17 PROCEDURE — 80048 BASIC METABOLIC PNL TOTAL CA: CPT | Performed by: INTERNAL MEDICINE

## 2020-08-17 PROCEDURE — 85027 COMPLETE CBC AUTOMATED: CPT | Performed by: INTERNAL MEDICINE

## 2020-08-17 RX ORDER — SODIUM CHLORIDE, SODIUM GLUCONATE, SODIUM ACETATE, POTASSIUM CHLORIDE, MAGNESIUM CHLORIDE, SODIUM PHOSPHATE, DIBASIC, AND POTASSIUM PHOSPHATE .53; .5; .37; .037; .03; .012; .00082 G/100ML; G/100ML; G/100ML; G/100ML; G/100ML; G/100ML; G/100ML
75 INJECTION, SOLUTION INTRAVENOUS CONTINUOUS
Status: DISCONTINUED | OUTPATIENT
Start: 2020-08-17 | End: 2020-08-17

## 2020-08-17 RX ORDER — POLYETHYLENE GLYCOL 3350 17 G/17G
17 POWDER, FOR SOLUTION ORAL ONCE
Status: COMPLETED | OUTPATIENT
Start: 2020-08-17 | End: 2020-08-17

## 2020-08-17 RX ORDER — POLYETHYLENE GLYCOL 3350 17 G/17G
17 POWDER, FOR SOLUTION ORAL DAILY
Status: DISCONTINUED | OUTPATIENT
Start: 2020-08-17 | End: 2020-08-19 | Stop reason: HOSPADM

## 2020-08-17 RX ORDER — PANTOPRAZOLE SODIUM 40 MG/1
40 INJECTION, POWDER, FOR SOLUTION INTRAVENOUS EVERY 12 HOURS SCHEDULED
Status: DISCONTINUED | OUTPATIENT
Start: 2020-08-17 | End: 2020-08-19

## 2020-08-17 RX ORDER — POLYETHYLENE GLYCOL 3350 17 G/17G
17 POWDER, FOR SOLUTION ORAL DAILY PRN
Status: DISCONTINUED | OUTPATIENT
Start: 2020-08-17 | End: 2020-08-17

## 2020-08-17 RX ORDER — SODIUM CHLORIDE, SODIUM LACTATE, POTASSIUM CHLORIDE, CALCIUM CHLORIDE 600; 310; 30; 20 MG/100ML; MG/100ML; MG/100ML; MG/100ML
75 INJECTION, SOLUTION INTRAVENOUS CONTINUOUS
Status: DISPENSED | OUTPATIENT
Start: 2020-08-17 | End: 2020-08-17

## 2020-08-17 RX ADMIN — PANTOPRAZOLE SODIUM 40 MG: 40 INJECTION, POWDER, FOR SOLUTION INTRAVENOUS at 10:40

## 2020-08-17 RX ADMIN — POLYETHYLENE GLYCOL 3350, SODIUM SULFATE ANHYDROUS, SODIUM BICARBONATE, SODIUM CHLORIDE, POTASSIUM CHLORIDE 4000 ML: 236; 22.74; 6.74; 5.86; 2.97 POWDER, FOR SOLUTION ORAL at 17:09

## 2020-08-17 RX ADMIN — PANTOPRAZOLE SODIUM 40 MG: 40 INJECTION, POWDER, FOR SOLUTION INTRAVENOUS at 22:01

## 2020-08-17 RX ADMIN — SODIUM CHLORIDE, SODIUM LACTATE, POTASSIUM CHLORIDE, AND CALCIUM CHLORIDE 75 ML/HR: .6; .31; .03; .02 INJECTION, SOLUTION INTRAVENOUS at 10:40

## 2020-08-17 RX ADMIN — BISACODYL 10 MG: 5 TABLET, COATED ORAL at 17:08

## 2020-08-17 RX ADMIN — POLYETHYLENE GLYCOL 3350 17 G: 17 POWDER, FOR SOLUTION ORAL at 17:09

## 2020-08-17 RX ADMIN — MELATONIN 3 MG: at 22:00

## 2020-08-17 RX ADMIN — ACETAMINOPHEN 650 MG: 325 TABLET, FILM COATED ORAL at 08:16

## 2020-08-17 RX ADMIN — AMLODIPINE BESYLATE 5 MG: 5 TABLET ORAL at 08:16

## 2020-08-17 RX ADMIN — BICALUTAMIDE 50 MG: 50 TABLET, FILM COATED ORAL at 08:15

## 2020-08-17 RX ADMIN — ATORVASTATIN CALCIUM 40 MG: 40 TABLET, FILM COATED ORAL at 08:16

## 2020-08-17 NOTE — PHYSICAL THERAPY NOTE
Physical Therapy Evaluation Note     Patient Name: Yana Triana    OZAJV'S Date: 8/17/2020     Problem List  Principal Problem:    Anemia  Active Problems:    History of prostate cancer    Chronic kidney disease    Attention to nephrostomy (HCC)    HTN (hypertension)    HLD (hyperlipidemia)    Murmur, cardiac    History of AAA (abdominal aortic aneurysm) repair    Severe aortic stenosis    Moderate protein-calorie malnutrition (HCC)    Melena    Constipation       Past Medical History  Past Medical History:   Diagnosis Date    AAA (abdominal aortic aneurysm) (HCC)     s/p repair, now w/ aneurysm distal to repair    CKD (chronic kidney disease)     unknown baseline    Hyperlipidemia     Hypertension     Prostate CA ([address] )     s/p nephrostomy tube, w/ bone mets    Pulmonary nodule     23mm RUL    Severe aortic stenosis         Past Surgical History  History reviewed  No pertinent surgical history  08/17/20 0902   Note Type   Note type Eval only   Pain Assessment   Pain Assessment Tool Pain Assessment not indicated - pt denies pain   Pain Score No Pain   Home Living   Type of [address] One level   Additional Comments Pt lives with his daughter in a [facility] with no VICKEY  Pt is I for ADL's and mobility prior  Pt is retired and drives  Pt owns a SPC, RW, and BSC      Prior Function   Level of [place] Independent with ADLs and functional mobility   Lives With Daughter   Receives Help From Family   ADL Assistance Independent   IADLs Independent   Vocational Retired   Comments + drives   Restrictions/Precautions   [place] Bearing Precautions Per Order No   Other Precautions Fall Risk   General   Family/Caregiver Present No   Cognition   Overall Cognitive Status WFL   Arousal/Participation Alert   Orientation Level Oriented X4   Memory Within functional limits   Following Commands Follows all commands and directions without difficulty   RLE Assessment   RLE Assessment WNL   LLE Assessment   LLE Assessment WNL   Coordination   Sensation WFL   Light Touch   RLE Light Touch Grossly intact   LLE Light Touch Grossly intact   Bed Mobility   Additional Comments pt OOB at begining of the session   Transfers   Sit to Stand 5  Supervision   Stand to Sit 5  Supervision   Ambulation/Elevation   Gait pattern Forward Flexion  (unsteady)   Gait Assistance 5  Supervision   Assistive Device Straight cane   Distance 50ftx1, [id number]   Stair Management Assistance   (CGA)   Stair Management Technique One rail R   Number of Stairs 2   Balance   Static Sitting Fair   Dynamic Sitting Fair   Static Standing Fair -   Dynamic Standing Fair -   Ambulatory Fair -   Endurance Deficit   Endurance Deficit Yes   Activity Tolerance   Activity Tolerance Patient limited by fatigue   Medical Staff Made Aware OT   Nurse Made Aware nurse approved therapy session   Assessment   Prognosis Good   Problem List Decreased strength;Decreased mobility   Assessment Pt is a 79 yo male admitted to [place] [address] on 8/14/2020 s/p weakness that is generalized  Dx: anemia, constipation, sever aortic stenosis, hx of AAA, HLD, HTN, attention to nephrostomy, CKD, hx of prostate CA  Two patient identifiers were used to confirm  Pt lives with his daughter in a [facility] with 2 VICKEY  Pt's daughter works during the day and he is home alone  Pt's daughter is a teacher  Pt uses a SPC at baseline  Pt was I for ADL's and mobility prior  Pt's impairments include reduced mobility, limited endurance, risk of falling  These impairments limit the ability of the patient to perform mobility without increased assistance, return to PLOF and participate in everyday life activities  Pt would benefit from continued skilled therapy while in the hospital to improve overall mobility and work towards a safe d/c  Recommend discharge to home with home PT   At the end of the session the patient was left in seated position with call bell and phone within reach  Pt slightly unsteady when ambulating and negotiating steps  Barriers to Discharge Decreased caregiver support   Goals   STG Expiration Date 08/31/20   Short Term Goal #1 STG 1: Pt will perform transfers at a MI level to return to baseline of function  STG 2: Pt will ambulate 300ft with SPC at a MI level to reduce the level of assistance needed upon d/c home  STG 3: Pt will negotiate 2 steps with HR at a MI level to ensure safety with activity  STG 4: Pt will perform bed mobility at a I to safety return to PLOF  PT Treatment Day 0   Plan   Treatment/Interventions Functional transfer training;LE strengthening/ROM; Therapeutic exercise; Endurance training;Elevations;Gait training;Bed mobility   PT Frequency Other (Comment)  (3-5xwk)   Recommendation   PT Discharge Recommendation Home with skilled therapy  (home PT)   PT - OK to Discharge Yes   Modified Emory Scale   Modified Emory Scale 2   Barthel Index   Feeding 10   Bathing 0   Grooming Score 5   Dressing Score 5   Bladder Score 10   Bowels Score 10   Toilet Use Score 5   Transfers (Bed/Chair) Score 10   Mobility (Level Surface) Score 10   Stairs Score 5   Barthel Index Score 70   Debra Morrow, Pt, DPT

## 2020-08-17 NOTE — PROGRESS NOTES
INTERNAL MEDICINE RESIDENCY PROGRESS NOTE     Name: Rebeca Lyons   Age & Sex: 80 y o  male   MRN: [id number]  Unit/Bed#: [facility] 913-01   Encounter: [id number]  Team: SOD Team B     PATIENT INFORMATION     Name: Rebeca Lyons   Age & Sex: 80 y o  male   MRN: [address] [place] Stay Days: 3    ASSESSMENT/PLAN     Principal Problem:    Anemia  Active Problems:    History of prostate cancer    Chronic kidney disease    Attention to nephrostomy (HCC)    HTN (hypertension)    HLD (hyperlipidemia)    Murmur, cardiac    History of AAA (abdominal aortic aneurysm) repair    Severe aortic stenosis    Moderate protein-calorie malnutrition (HCC)    Melena    Constipation      * Anemia  Assessment & Plan  · Patient had severe symptomatic anemia on presentation  · Hemoglobin on presentation 5 1, microcytic  · Required 3U PRBC transfusions since admission, Hemoglobin 7 7 today from 6 7 yesterday  · Oncology consulted and we are awaiting SPEP and free light chains   · Patient is complaining of melena this morning and says his stools are intermittently black  · Rectal exam done showed brown stools in the rectal vault   · Given severe aortic stenosis, Heyde's syndrome is a possibility  · Will obtain INR, PTT and vWF  · Hemoglobin checks Q12H  · PPI iv BID   · Will consult gastroenterology as patient has never had an EGD/ colonoscopy in the past, given elevated BUN, concern for upper GI source, Heyde's?    · Transfuse if hemoglobin less than 7      Constipation  Assessment & Plan  · Start miralax daily  · Continue to monitor     Severe aortic stenosis  Assessment & Plan  · Patient has a loud systolic murmur and an ECHO was obtained   · ECHO showed severe aortic stenosis of trileaflet valve 0 9cm², valve gradient was 39  · No signs and symptoms of heart failure currently and patient is euvolemic on exam       History of AAA (abdominal aortic aneurysm) repair  Assessment & Plan  · Patient endorsed a history of AAA repair 2 years ago    HLD (hyperlipidemia)  Assessment & Plan  · Stable  · Continue Lipitor    HTN (hypertension)  Assessment & Plan  · Stable  · Continue amlodipine, diltiazem    Attention to nephrostomy [facility])  Assessment & Plan  · Patient had nephrostomy tube placed 1 year ago for 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  · Patient reports no difficulties with nephrostomy at this time, denies any hematuria  · Patient does have urine output through the nephrostomy tube as well as normal urination through his penis at this time  · If any issues with nephroscopy developed, would consider consulting Urology  · Monitor urine output    Chronic kidney disease  Assessment & Plan  · Patient has history of chronic kidney disease, unknown baseline creatinine at this time  Did stated history of temporary dialysis during prior hospitalization however no records are available at this time  · Creatinine currently 2 21 in the setting of anemia  · Continue to monitor creatinine, is receiving 2 units of PRBCs  · Consider Nephrology consult as an outpatient    History of prostate cancer  Assessment & Plan  · History of prostate cancer diagnosed 1 year ago, was following with Oncology at [facility]  · No records are currently available at and patient is unsure on how to obtain them  · Denies any prior history of chemotherapy, radiation  Had left nephrostomy tube placed due to obstructive uropathy  · Chest x-ray suggests infiltrative disease probably secondary to his cancer, will obtain CTA of the chest abdomen pelvis to evaluate  · Patient would like to establish care with Oncology in the area, will consult while he is inpatient for further recommendations  · Follow up as an outpatient with Oncology      Disposition: Continue inpatient care     SUBJECTIVE     Patient seen and examined  No acute events overnight  Patient reports melena this morning  Says this happens to him intermittently   Otherwise he denies abdominal pain, nausea, vomiting  OBJECTIVE     Vitals:    20 1430 20 2231 20 0315 20 0625   BP: 146/51 141/51 110/51 119/51   BP Location:       Pulse: 64 59 56 (!) 50   Resp:    Temp: 97 8 °F (36 6 °C) 99 3 °F (37 4 °C) 98 7 °F (37 1 °C) 99 °F (37 2 °C)   TempSrc:       SpO2: 97% 96% 96% 95%   Weight:       Height:          Temperature:   Temp (24hrs), Av 5 °F (36 9 °C), Min:97 8 °F (36 6 °C), Max:99 3 °F (37 4 °C)    Temperature: 99 °F (37 2 °C)  Intake & Output:  I/O       08/15 07 -  0700  07 -  0700  07 -  0700    P  O  760 840     Blood  300     Total Intake(mL/kg) 760 (12 9) 1140 (19 3)     Urine (mL/kg/hr) 1325 (0 9) 1115 (0 8)     Stool 0      Total Output 1325 1115     Net -565 +25            Unmeasured Stool Occurrence 1 x          Weights:   IBW: 68 4 kg    Body mass index is 19 78 kg/m²  Weight (last 2 days)     Date/Time   Weight    20 1152   59 (130 07)            Physical Exam:   GENERAL: NAD  HEENT:  NC/AT, MMM, no scleral icterus  CARDIAC:  Loud systolic murmur best heard in second intercostal space on the right side  PULMONARY:  CTA B/L, no wheezing/rales/rhonci, non-labored breathing  ABDOMEN:  Soft, NT/ND, +BS, no rebound/guarding/rigidity, rectal exam shows brown stools   Extremities:  2+ Pulses in DP/PT  No edema, cyanosis, or clubbing  NEUROLOGIC:  Alert/oriented x3  SKIN:  No rashes or erythema    LABORATORY DATA     Labs: I have personally reviewed pertinent reports  Results from last 7 days   Lab Units 20  0435 20  1536 20  0607 08/15/20  1619  20  1104   WBC Thousand/uL 6 45  --  5 71 6 60   < > 5 20   HEMOGLOBIN g/dL 7 7* 9 1* 6 7* 7 9*   < > 5 1*   HEMATOCRIT % 25 3* 29 6* 22 9* 27 2*   < > 19 0*   PLATELETS Thousands/uL 196  --  202 216   < > 219   NEUTROS PCT %  --   --  60 64  --  69   MONOS PCT %  --   --  11 11  --  9    < > = values in this interval not displayed  Results from last 7 days   Lab Units 08/17/20  0435 08/16/20  0607 08/15/20  0444 08/14/20  1104   POTASSIUM mmol/L 4 3 4 4 4 6 4 8   CHLORIDE mmol/L 110* 114* 113* 110*   CO2 mmol/L 26 26 25 26   BUN mg/dL 39* 42* 41* 46*   CREATININE mg/dL 1 69* 1 93* 1 89* 2 21*   CALCIUM mg/dL 10 0 9 6 9 9 9 6   ALK PHOS U/L  --   --   --  196*   ALT U/L  --   --   --  14   AST U/L  --   --   --  35     Results from last 7 days   Lab Units 08/15/20  0444   MAGNESIUM mg/dL 2 2     Results from last 7 days   Lab Units 08/15/20  0444   PHOSPHORUS mg/dL 3 1              Results from last 7 days   Lab Units 08/14/20  1104   TROPONIN I ng/mL <0 02       IMAGING & DIAGNOSTIC TESTING     Radiology Results: I have personally reviewed pertinent reports  Ct Chest Abdomen Pelvis Wo Contrast    Result Date: 8/14/2020  Impression: Enlarged, irregular contoured prostate gland in keeping with history of prostate cancer, possibly extending into the bladder (series 2 images 67 through 74 ) Diffuse predominantly sclerotic but also some lytic bone metastases throughout the visualized skeleton  Mild right-sided hydroureteronephrosis  There is an abdominal aortic aneurysm status post bifurcated endovascular stent grafting  There is no evidence of rupture  The aneurysm sac is quite large at 10 2 cm  Possibility endoleak cannot be excluded without IV contrast  Severe emphysema  There are also 2 ground glass density pulmonary nodules, larger a 23 mm right upper lobe nodule  Based on current Fleischner Society 2017 Guidelines on incidental pulmonary nodule, followup noncontrast CT is recommended at 6-12 months from the initial examination to confirm persistence; if stable at that time, additional followup CT is recommended for every 2 years until 5 years of stability is demonstrated   Workstation performed: [id number]     Xr Chest 2 Views    Result Date: 8/14/2020  Impression: Numerous nodular density projecting over the lung fields, which could be pulmonary nodules or pleural plaques  There are also multiple suspected sclerotic bone lesions, for example in the inferior border of the right scapula  Recommend chest CT for further evaluation  The study was marked in EPIC for significant notification  Workstation performed: [id number]     Other Diagnostic Testing: I have personally reviewed pertinent reports  ACTIVE MEDICATIONS     Current Facility-Administered Medications   Medication Dose Route Frequency    acetaminophen (TYLENOL) tablet 650 mg  650 mg Oral Q6H PRN    amLODIPine (NORVASC) tablet 5 mg  5 mg Oral Daily    atorvastatin (LIPITOR) tablet 40 mg  40 mg Oral Daily    bicalutamide (CASODEX) tablet 50 mg  50 mg Oral Daily    diltiazem (CARDIZEM CD) 24 hr capsule 120 mg  120 mg Oral Daily    melatonin tablet 3 mg  3 mg Oral HS    multi-electrolyte (PLASMALYTE-A/ISOLYTE-S PH 7 4) IV solution  75 mL/hr Intravenous Continuous    pantoprazole (PROTONIX) injection 40 mg  40 mg Intravenous Q12H [address]       VTE Pharmacologic Prophylaxis: Enoxaparin (Lovenox)  VTE Mechanical Prophylaxis: sequential compression device    Portions of the record may have been created with voice recognition software  Occasional wrong word or "sound a like" substitutions may have occurred due to the inherent limitations of voice recognition software    Read the chart carefully and recognize, using context, where substitutions have occurred   ==  Paxton Coello MD  [address]  Internal Medicine Residency PGY-3

## 2020-08-17 NOTE — PLAN OF CARE
Problem: PHYSICAL THERAPY ADULT  Goal: Performs mobility at highest level of function for planned discharge setting  See evaluation for individualized goals  Description: Treatment/Interventions: Functional transfer training, LE strengthening/ROM, Therapeutic exercise, Endurance training, Elevations, Gait training, Bed mobility          See flowsheet documentation for full assessment, interventions and recommendations  Note: Prognosis: Good  Problem List: Decreased strength, Decreased mobility  Assessment: Pt is a 81 yo male [facility] on 8/14/2020 s/p weakness that is generalized  Dx: anemia, constipation, sever aortic stenosis, hx of AAA, HLD, HTN, attention to nephrostomy, CKD, hx of prostate CA  Two patient identifiers were used to confirm  Pt lives with his daughter in a [facility] with 2 VICKEY  Pt's daughter works during the day and he is home alone  Pt's daughter is a teacher  Pt uses a SPC at baseline  Pt was I for ADL's and mobility prior  Pt's impairments include reduced mobility, limited endurance, risk of falling  These impairments limit the ability of the patient to perform mobility without increased assistance, return to PLOF and participate in everyday life activities  Pt would benefit from continued skilled therapy while in the hospital to improve overall mobility and work towards a safe d/c  Recommend discharge to home with home PT  At the end of the session the patient was left in seated position with call bell and phone within reach  Pt slightly unsteady when ambulating and negotiating steps  Barriers to Discharge: Decreased caregiver support     PT Discharge Recommendation: Home with skilled therapy(home PT)     PT - OK to Discharge: Yes    See flowsheet documentation for full assessment

## 2020-08-17 NOTE — CONSULTS
Consultation - 126 [facility]  Sanjay Isaac 80 y o  male MRN: [id number]  Unit/Bed#: [facility] 913-01 Encounter: [id number]              Inpatient consult to gastroenterology     Performed by  Kenn Odell MD     Authorized by Paxton Coello MD              Reason for Consult / Principal Problem:     Symptomatic anemia    ASSESSMENT AND PLAN:      1  Acute symptomatic microcytic anemia  Patient presenting to the hospital with primary complaints of dyspnea on exertion, generalized fatigue since many days  Has extensive history of prostate cancer with multiple Mets to bones and IR requiring chemo and radiation therapy with multiple transfusions the past as reported by the patient  Recently relocated to this area, previous records unavailable  On admission vitals stable  Hemoglobin 5 1 on admission, has been fluctuating since  So far has received 3 units PRBC transfusion which he tolerated well  Hemoglobin 9 1 yesterday, dropped again to 7 7 however this single isolated value of 9 1 seems offset  Hb has been correcting appropriately with transfusions  Iron panel WNL  FOBT +ve     -- on NH exam : brown stool, no melena/active blood   -- Hb fluctuating, has been correcting appropriately, value of 9 1 seems offset   -- 3 PRBC transfusions so far   -- as confirmed with the daughter pt did have colonoscopy few years ago but no accurate details available   -- will plan for EGD + colonoscopy tomorrow and reevaluate   -- follow Hb daily    2  Metastatic prostate cancer  PT has a hx of prostate cancer with multiple metastasis noted to bones and bladder wall  As per the patient has been on radio and chemotherapy at [facility], recently relocated to the area  Does not remember record details about the chemotherapeutic agent or duration  Daughter contacted over the phone  Requested to bring in the records  No evidence of hematuria as confirmed with the nurse   Heme/onc following  3  AAA s/p remote repair  As seen on CT scan patient does have nearly 10 cm aortic aneurysm status post endovascular graft  No other evidence of active bleeding  4   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  Rx as per primary team  Creatinine improved today from 2 on admission to 1/69 today  5  Severe aortic stenosis  As noted on the ECHO, gradient 39, area 0 9 cm2  No active pertinent symptoms  Plan discussed with patient and daughter Elvis Castañeda, agrees with the plan    ______________________________________________________________________    HPI:  Patient is 59-year-old male with past medical history of prostate cancer with multiple mets to bone 9 urinary bladder, hypertension, obstructive uropathy with CKD status post percutaneous nephrostomy presented to the ED with primary complaints of worsening exertional dyspnea and generalized fatigue  Patient denied any lateralizing GI complaints like diarrhea, constipation, hematochezia, sticky black bowel movements, hematemesis, nausea, vomiting  Patient does report that he has been losing a lot of weight since the diagnosis of the cancer  Also reports poor appetite during similar time period  Of note patient recently relocated from [place] to this area  [facility] with [facility] diagnosis of cancer was made  Reports getting chemoradiotherapy for the same however does not remit accurate details  Previous records not available on chart  Since admission patient has received 3 PRBC transfusion so far with fluctuating hemoglobin levels  See a and P for further details      PMH/PSH  Prostate cancer with multiple metastasis  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  Emphysema  Hypertension  Hyperlipidemia  Aortic aneurysm repair      REVIEW OF SYSTEMS:    CONSTITUTIONAL: Denies any fever, chills, rigors, and weight loss   HEENT: No earache or tinnitus  Denies hearing loss or visual disturbances  CARDIOVASCULAR: No chest pain or palpitations  RESPIRATORY: Denies any cough, hemoptysis, shortness of breath or dyspnea on exertion  GASTROINTESTINAL: As noted in the History of Present Illness  GENITOURINARY: No problems with urination  Denies any hematuria or dysuria  NEUROLOGIC: No dizziness or vertigo, denies headaches  MUSCULOSKELETAL: Denies any muscle or joint pain  SKIN: Denies skin rashes or itching  ENDOCRINE: Denies excessive thirst  Denies intolerance to heat or cold  PSYCHOSOCIAL: Denies depression or anxiety  Denies any recent memory loss  Historical Information   Past Medical History:   Diagnosis Date    AAA (abdominal aortic aneurysm) (HCC)     s/p repair, now w/ aneurysm distal to repair    CKD (chronic kidney disease)     unknown baseline    Hyperlipidemia     Hypertension     Prostate CA ([address] )     s/p nephrostomy tube, w/ bone mets    Pulmonary nodule     23mm RUL    Severe aortic stenosis      History reviewed  No pertinent surgical history  Social History   Social History     Substance and Sexual Activity   Alcohol Use Never    Frequency: Never     Social History     Substance and Sexual Activity   Drug Use Never     Social History     Tobacco Use   Smoking Status Never Smoker   Smokeless Tobacco Never Used     History reviewed  No pertinent family history      Meds/Allergies     Medications Prior to Admission   Medication    amLODIPine (NORVASC) 5 mg tablet    atorvastatin (LIPITOR) 40 mg tablet    b complex-C-folic acid (NEPHRO-YONIS) 0 8 mg tablet    bicalutamide (CASODEX) 50 mg tablet    calcitriol (ROCALTROL) 0 25 mcg capsule    diltiazem (dilTIAZem CD) 120 mg 24 hr capsule     Current Facility-Administered Medications   Medication Dose Route Frequency    acetaminophen (TYLENOL) tablet 650 mg  650 mg Oral Q6H PRN    amLODIPine (NORVASC) tablet 5 mg  5 mg Oral Daily    atorvastatin (LIPITOR) tablet 40 mg  40 mg Oral Daily    bicalutamide (CASODEX) tablet 50 mg  50 mg Oral Daily    diltiazem (CARDIZEM CD) 24 hr capsule 120 mg  120 mg Oral Daily    lactated ringers infusion  75 mL/hr Intravenous Continuous    melatonin tablet 3 mg  3 mg Oral HS    pantoprazole (PROTONIX) injection 40 mg  40 mg Intravenous Q12H [facility]    polyethylene glycol (MIRALAX) packet 17 g  17 g Oral Daily       No Known Allergies        Objective     Blood pressure 119/51, pulse (!) 50, temperature 99 °F (37 2 °C), resp  rate 20, height 5' 8" (1 727 m), weight 59 kg (130 lb 1 1 oz), SpO2 95 %  Body mass index is 19 78 kg/m²  Intake/Output Summary (Last 24 hours) at 8/17/2020 1441  Last data filed at 8/17/2020 1235  Gross per 24 hour   Intake 1640 ml   Output 1100 ml   Net 540 ml         PHYSICAL EXAM:      General Appearance:   Alert, cooperative, no distress   HEENT:   Normocephalic, atraumatic, anicteric, pallor noted     Lungs:   Scattered wheezing, no tachypnea/cyanosis/tracheal deviation    Heart[de-identified]   Regular rate and rhythm; no murmur, rub, or gallop  Abdomen:   Soft, non-tender, non-distended; normal bowel sounds; no masses, no organomegaly    Rectal:   Brown stool, no BRBPR/melena    Extremities:  No cyanosis, clubbing or edema    Pulses:  2+ and symmetric all extremities      Lab Results:   No results displayed because visit has over 200 results  Imaging Studies: I have personally reviewed pertinent imaging studies

## 2020-08-17 NOTE — OCCUPATIONAL THERAPY NOTE
Occupational Therapy Evaluation     Patient Name: Keaton Douglas  DMYWX'H Date: 8/17/2020  Problem List  Principal Problem:    Anemia  Active Problems:    History of prostate cancer    Chronic kidney disease    Attention to nephrostomy (HCC)    HTN (hypertension)    HLD (hyperlipidemia)    Murmur, cardiac    History of AAA (abdominal aortic aneurysm) repair    Severe aortic stenosis    Moderate protein-calorie malnutrition (HCC)    Melena    Constipation    Past Medical History  Past Medical History:   Diagnosis Date    AAA (abdominal aortic aneurysm) (HCC)     s/p repair, now w/ aneurysm distal to repair    CKD (chronic kidney disease)     unknown baseline    Hyperlipidemia     Hypertension     Prostate CA ([address] )     s/p nephrostomy tube, w/ bone mets    Pulmonary nodule     23mm RUL    Severe aortic stenosis      Past Surgical History  History reviewed  No pertinent surgical history  08/17/20 0905   Note Type   Note type Eval only   Restrictions/Precautions   Weight Bearing Precautions Per Order No   Other Precautions Fall Risk; Chair Alarm; Bed Alarm   Pain Assessment   Pain Assessment Tool 0-10   Pain Score No Pain   Home Living   Type of [address] One level   Bathroom Shower/Tub Tub/shower unit   Bathroom Toilet Standard   Bathroom Equipment Shower chair;Grab bars in shower;Commode  (reports use of SC PTA)   Home Equipment Cane   Prior Function   Level of [place] Independent with ADLs and functional mobility   Lives With Daughter   Receives Help From Family   ADL Assistance Independent   IADLs Needs assistance   Falls in the last 6 months 0   Vocational Retired   Comments Pt reports his dtr works (is currently working from home) & is able to assist when home as needed  Lifestyle   Autonomy At baseline pt was completing ADLs IND (reports dtr will assist as needed), family assist for IADLs ambulating MOD IND with SPC, (-) driving      Reciprocal Relationships supportive dtr   Service to Others retired   Intrinsic Gratification pt enjoys spending time with family   Psychosocial   Psychosocial (WDL) WDL   Subjective   Subjective "I'm feeling better"   ADL   Eating Assistance 7  Independent   Grooming Assistance 5  Supervision/Setup   [address] 5  Supervision/Setup   LB [address] 4  [address] 5  Supervision/Setup   LB [address]   Additional items Increased time required   Stand to Sit 5  Supervision   Additional items Increased time required   Additional Comments [facility]   Functional Mobility   Functional Mobility 5  Supervision   Additional Comments demo ability to ambulate household distances; denying dizziness/lightheadedness/SOB throughout   Additional items [facility]   Balance   Static Sitting Fair +   Dynamic Sitting Fair   Static Standing Fair   Dynamic Standing Fair -   Activity Tolerance   Activity Tolerance Patient limited by fatigue   Medical Staff Made Aware PT, CM   Nurse Made Aware Per RN pt appropriate to be seen   RUE Assessment   RUE Assessment WFL   LUE Assessment   LUE Assessment WFL   Hand Function   Gross Motor Coordination Functional   Fine Motor Coordination Functional   Cognition   Overall Cognitive Status WFL   Arousal/Participation Alert; Cooperative   Attention Within functional limits   Orientation Level Oriented X4   Memory Decreased recall of recent events   Following Commands Follows one step commands with increased time or repetition   Comments Pt's primary language is Norwegian  Pt able to communicate & make needs known in [place]  Pt appears to have good insight into physical deficits   Assessment   Limitation Decreased ADL status; Decreased endurance;Decreased self-care trans;Decreased high-level ADLs   Prognosis Good   Assessment Pt is a 80 y o  male who was admitted to [place] [address] [place] on 8/14/2020 with generalized weakness and SOB; pt diagnosed with Anemia   Pt's problem list also includes PMH of prostate cancer with bone metastases, CKD, AAA repair, cardiac murmur, HLD, HTN, nephrostomy  At baseline pt was completing ADLs IND (reports dtr will assist as needed), family assist for IADLs ambulating MOD IND with SPC, (-) driving  Pt lives with his dtr in a home with 1st floor set up  Pt reports his dtr works (is currently working from home) & is able to assist when home as needed  Currently pt requires SUP-MIN A for overall ADLS and for functional mobility/transfers  Pt had blood transfusion during hospital stay and reports he "feels better" since - pt denying dizziness and SOB throughout session  Pt currently presents with impairments in the following categories -difficulty performing ADLS activity tolerance, endurance, standing balance/tolerance and memory  These impairments, as well as pt's fatigue, decreased caregiver support, risk for falls and home environment  limit pt's ability to safely engage in all baseline areas of occupation, includinggrooming, bathing, dressing, toileting, functional mobility/transfers and leisure activities   From OT standpoint, recommend [address] D/C  OT will continue to follow to address the below stated goals  Goals   Patient Goals to go home   LTG Time Frame 10-14   Long Term Goal #1 see goals below   Plan   Treatment Interventions ADL retraining;Functional transfer training; Endurance training;Patient/family training;Equipment evaluation/education; Compensatory technique education; Energy conservation; Activityengagement   Goal Expiration Date 08/31/20   OT Frequency 3-5x/wk   Recommendation   OT Discharge Recommendation Home with skilled therapy  (HOME OT & home with family support)   Equipment Recommended   (pt owns SC and BSC - recommend use)   Modified Clanton Scale   Modified Clanton Scale 3          GOALS     Pt will improve activity tolerance to G for min 30 min txment sessions     Pt will complete UB ADLs with MOD IND and use of adaptive device and DME as needed     Pt will complete LB ADLs with MOD IND w/ use of AE and DME as needed     Pt will complete toileting w/ MOD IND w/ G hygiene/thoroughness using DME as needed     Pt will improve functional transfers to Mod I on/off all surfaces using DME as needed w/ G balance/safety      Pt will improve functional mobility during ADL/IADL/leisure tasks to Mod I using DME as needed w/ G balance/safety      Pt will demonstrate G carryover of pt/caregiver education and training as appropriate w/ mod I w/o cues w/ good tolerance     Pt to participate in ongoing functional cognitive assessment with Good attention/participation to assist with safe D/C planning  Pt to demo % carryover of energy conservation strategies during functional tasks          Theora Denny, OT

## 2020-08-17 NOTE — SOCIAL WORK
Initial interview:     LOS 2d  Not a Bundle  Not a Readmit  ReAdmit Risk Score - green, 13    *Note: pt's primary language is Qatari  Per RN Ammon Solorio, pt understands most English and speaks English well  CM met with the patient to review the CM role and discuss possible dc needs  Pt lives with his Dtr Freddy Hre in a single story home in [place], [place]  No VICKEY  Pt is independent, retired and drives; he reports that his Dtr does most of the driving  Pt stated he has a SPC, RW, BSC and a shower chair  No hx of VNA or IP rehab  Pt denied drug, etoh or mental illness history  No POA or LW  Prescriptions are filled at "a pharmacy off [address]  I don't know the name"  Main contact:   Dtr Georges Aguirre [phone]  Dtr Aide    A post acute care recommendation was made by your care team for [facility] AT [facility]  Discussed [place] of Choice with patient  List of agencies given to patient via in person  patient aware the list is custom filtered for them by preference  and that [facility] post acute providers are designated  Pt agreeable to VNA for PT/OT  Pt uncertain if his insurance with cover services but asked CM to make referral to [address] [place] [address] ; [facility] sent  Pt also asked for assist with getting increased services in the home  CM discussed [employer] for [employer] assistance  DC Plan - home PT/OT; ECIN to [address] [place] [address]  Dtr to transport at [facility]  CM reviewed d/c planning process including the following: identifying help at home, patient preference for d/c planning needs, Discharge Lounge, [facility] Meds to Bed program, availability of treatment team to discuss questions or concerns patient and/or family may have regarding understanding medications and recognizing signs and symptoms once discharged  CM also encouraged patient to follow up with all recommended appointments after discharge  Patient advised of importance for patient and family to participate in managing patients medical well being

## 2020-08-17 NOTE — PROGRESS NOTES
Progress Note - Buffy Schneider 80 y o  male MRN: [id number]    Unit/Bed#: [facility] 913-01 Encounter: [id number]      Assessment:  In summary, this is an 63-year-old male history of prostate cancer with bone metastases by history  PSA is consistent with this  Process of obtaining previous records is ongoing  CT scan shows findings consistent with bone metastases  No obvious source of bleeding  Iron studies suggest an iron replete state  Other studies currently pending  GI evaluation is planned given history of melena and anemia  Plan:  See above  Subjective:   Patient states that he feels better since having a blood transfusion with increased energy, decreased dyspnea  He has some pain at the percutaneous nephrostomy site  Had some melena  No other gross bleeding symptoms at present  Objective:  Iron saturation 73%, ferritin 40  Vitamin B12 428  Folate greater than 20   6 Hemoccult-positive CT scan chest abdomen pelvis showed enlarged irregular prostate  Diffuse sclerotic bone lesions  Severe emphysema with to ground-glass pulmonary opacities  Vitals: Blood pressure 119/51, pulse (!) 50, temperature 99 °F (37 2 °C), resp  rate 20, height 5' 8" (1 727 m), weight 59 kg (130 lb 1 1 oz), SpO2 95 %  ,Body mass index is 19 78 kg/m²  Intake/Output Summary (Last 24 hours) at 8/17/2020 1024  Last data filed at 8/17/2020 0900  Gross per 24 hour   Intake 1560 ml   Output 1540 ml   Net 20 ml       Physical Exam:   General Appearance:    Alert, oriented        Eyes:    PERRL   Ears:    Normal external ear canals, both ears   Nose:   Nares normal, septum midline   Throat:   Mucosa moist  Pharynx without injection      Neck:   Supple       Lungs:     Clear to auscultation bilaterally   Chest Wall:    No tenderness or deformity    Heart:    Regular rate and rhythm       Abdomen:     Soft, non-tender, bowel sounds +, no organomegaly           Extremities:   Extremities no cyanosis or edema Skin:   no rash or icterus  Lymph nodes:   Cervical, supraclavicular, and axillary nodes normal   Neurologic:   CNII-XII intact, normal strength, sensation and reflexes     throughout          Invasive Devices     Peripheral Intravenous Line            Peripheral IV 08/14/20 Left Antecubital 2 days    Peripheral IV 08/14/20 Left Antecubital 2 days          Drain            Nephrostomy Left -- days                Lab, Imaging and other studies: I have personally reviewed pertinent reports

## 2020-08-17 NOTE — ASSESSMENT & PLAN NOTE
Patient has a loud systolic ejection murmur   - Echo 8/16 showed severe aortic stenosis of trileaflet valve 0 9 cm², valve gradient was 39  - No signs and symptoms of heart failure currently and patient is euvolemic on exam  - Cardiothoracic surgery will set patient up in TAVR clinic  - Follow up with Cardiology outpatient

## 2020-08-17 NOTE — PLAN OF CARE
Problem: OCCUPATIONAL THERAPY ADULT  Goal: Performs self-care activities at highest level of function for planned discharge setting  See evaluation for individualized goals  Description: Treatment Interventions: ADL retraining, Functional transfer training, Endurance training, Patient/family training, Equipment evaluation/education, Compensatory technique education, Energy conservation, Activityengagement  Equipment Recommended: (pt owns SC and BSC - recommend use)       See flowsheet documentation for full assessment, interventions and recommendations  Note: Limitation: Decreased ADL status, Decreased endurance, Decreased self-care trans, Decreased high-level ADLs  Prognosis: Good  Assessment: Pt is a 80 y o  male who was admitted to [facility] on 8/14/2020 with generalized weakness and SOB; pt diagnosed with Anemia   Pt's problem list also includes PMH of prostate cancer with bone metastases, CKD, AAA repair, cardiac murmur, HLD, HTN, nephrostomy  At baseline pt was completing ADLs IND (reports dtr will assist as needed), family assist for IADLs ambulating MOD IND with SPC, (-) driving  Pt lives with his dtr in a home with 1st floor set up  Pt reports his dtr works (is currently working from home) & is able to assist when home as needed  Currently pt requires SUP-MIN A for overall ADLS and for functional mobility/transfers  Pt had blood transfusion during hospital stay and reports he "feels better" since - pt denying dizziness and SOB throughout session  Pt currently presents with impairments in the following categories -difficulty performing ADLS activity tolerance, endurance, standing balance/tolerance and memory  These impairments, as well as pt's fatigue, decreased caregiver support, risk for falls and home environment  limit pt's ability to safely engage in all baseline areas of occupation, includinggrooming, bathing, dressing, toileting, functional mobility/transfers and leisure activities   From OT standpoint, recommend [address] upon D/C  OT will continue to follow to address the below stated goals        OT Discharge Recommendation: Home with skilled therapy(HOME OT & home with family support)

## 2020-08-17 NOTE — RESTORATIVE TECHNICIAN NOTE
Restorative Specialist Mobility Note       Activity: Ambulate in ga, Ambulate in room, Bathroom privileges, Chair, Dangle, Stand at bedside(Educated/encouraged pt to ambulate with assistance 3-4 x's/day  Bed alarm on   Pt callbell, phone/tray within reach )     Assistive Device: Neena NOWAK, Restorative Technician, [employer]

## 2020-08-18 ENCOUNTER — ANESTHESIA EVENT (INPATIENT)
Dept: GASTROENTEROLOGY | Facility: HOSPITAL | Age: 85
DRG: 378 | End: 2020-08-18
Payer: MEDICARE

## 2020-08-18 PROBLEM — N18.6 END STAGE RENAL DISEASE (HCC): Status: ACTIVE | Noted: 2020-08-18

## 2020-08-18 LAB
ALBUMIN SERPL BCP-MCNC: 3.1 G/DL (ref 3.5–5)
ALP SERPL-CCNC: 200 U/L (ref 46–116)
ALT SERPL W P-5'-P-CCNC: 13 U/L (ref 12–78)
ANION GAP SERPL CALCULATED.3IONS-SCNC: 5 MMOL/L (ref 4–13)
AST SERPL W P-5'-P-CCNC: 23 U/L (ref 5–45)
BASOPHILS # BLD AUTO: 0.01 THOUSANDS/ΜL (ref 0–0.1)
BASOPHILS NFR BLD AUTO: 0 % (ref 0–1)
BILIRUB SERPL-MCNC: 0.39 MG/DL (ref 0.2–1)
BUN SERPL-MCNC: 34 MG/DL (ref 5–25)
CALCIUM SERPL-MCNC: 10 MG/DL (ref 8.3–10.1)
CHLORIDE SERPL-SCNC: 109 MMOL/L (ref 100–108)
CO2 SERPL-SCNC: 29 MMOL/L (ref 21–32)
CREAT SERPL-MCNC: 1.67 MG/DL (ref 0.6–1.3)
EOSINOPHIL # BLD AUTO: 0.41 THOUSAND/ΜL (ref 0–0.61)
EOSINOPHIL NFR BLD AUTO: 7 % (ref 0–6)
ERYTHROCYTE [DISTWIDTH] IN BLOOD BY AUTOMATED COUNT: 22.4 % (ref 11.6–15.1)
GFR SERPL CREATININE-BSD FRML MDRD: 36 ML/MIN/1.73SQ M
GGT SERPL-CCNC: 22 U/L (ref 5–85)
GLUCOSE SERPL-MCNC: 78 MG/DL (ref 65–140)
HCT VFR BLD AUTO: 26.6 % (ref 36.5–49.3)
HCT VFR BLD AUTO: 31.7 % (ref 36.5–49.3)
HGB BLD-MCNC: 7.9 G/DL (ref 12–17)
HGB BLD-MCNC: 9 G/DL (ref 12–17)
IMM GRANULOCYTES # BLD AUTO: 0.03 THOUSAND/UL (ref 0–0.2)
IMM GRANULOCYTES NFR BLD AUTO: 1 % (ref 0–2)
INR PPP: 1.16 (ref 0.84–1.19)
KAPPA LC FREE SER-MCNC: 105.7 MG/L (ref 3.3–19.4)
KAPPA LC FREE/LAMBDA FREE SER: 1.44 {RATIO} (ref 0.26–1.65)
LAMBDA LC FREE SERPL-MCNC: 73.4 MG/L (ref 5.7–26.3)
LYMPHOCYTES # BLD AUTO: 1.3 THOUSANDS/ΜL (ref 0.6–4.47)
LYMPHOCYTES NFR BLD AUTO: 22 % (ref 14–44)
MCH RBC QN AUTO: 22.9 PG (ref 26.8–34.3)
MCHC RBC AUTO-ENTMCNC: 29.7 G/DL (ref 31.4–37.4)
MCV RBC AUTO: 77 FL (ref 82–98)
MONOCYTES # BLD AUTO: 0.54 THOUSAND/ΜL (ref 0.17–1.22)
MONOCYTES NFR BLD AUTO: 9 % (ref 4–12)
NEUTROPHILS # BLD AUTO: 3.61 THOUSANDS/ΜL (ref 1.85–7.62)
NEUTS SEG NFR BLD AUTO: 61 % (ref 43–75)
NRBC BLD AUTO-RTO: 0 /100 WBCS
PLATELET # BLD AUTO: 201 THOUSANDS/UL (ref 149–390)
PMV BLD AUTO: 9.8 FL (ref 8.9–12.7)
POTASSIUM SERPL-SCNC: 4.6 MMOL/L (ref 3.5–5.3)
PROT SERPL-MCNC: 7.1 G/DL (ref 6.4–8.2)
PROTHROMBIN TIME: 14.8 SECONDS (ref 11.6–14.5)
RBC # BLD AUTO: 3.45 MILLION/UL (ref 3.88–5.62)
SODIUM SERPL-SCNC: 143 MMOL/L (ref 136–145)
WBC # BLD AUTO: 5.9 THOUSAND/UL (ref 4.31–10.16)

## 2020-08-18 PROCEDURE — 80053 COMPREHEN METABOLIC PANEL: CPT | Performed by: INTERNAL MEDICINE

## 2020-08-18 PROCEDURE — 82977 ASSAY OF GGT: CPT | Performed by: INTERNAL MEDICINE

## 2020-08-18 PROCEDURE — 85025 COMPLETE CBC W/AUTO DIFF WBC: CPT | Performed by: INTERNAL MEDICINE

## 2020-08-18 PROCEDURE — 97535 SELF CARE MNGMENT TRAINING: CPT

## 2020-08-18 PROCEDURE — 85014 HEMATOCRIT: CPT | Performed by: INTERNAL MEDICINE

## 2020-08-18 PROCEDURE — 97110 THERAPEUTIC EXERCISES: CPT

## 2020-08-18 PROCEDURE — C9113 INJ PANTOPRAZOLE SODIUM, VIA: HCPCS | Performed by: INTERNAL MEDICINE

## 2020-08-18 PROCEDURE — 85018 HEMOGLOBIN: CPT | Performed by: INTERNAL MEDICINE

## 2020-08-18 PROCEDURE — 85610 PROTHROMBIN TIME: CPT | Performed by: STUDENT IN AN ORGANIZED HEALTH CARE EDUCATION/TRAINING PROGRAM

## 2020-08-18 PROCEDURE — 97530 THERAPEUTIC ACTIVITIES: CPT

## 2020-08-18 PROCEDURE — 97116 GAIT TRAINING THERAPY: CPT

## 2020-08-18 RX ADMIN — POLYETHYLENE GLYCOL 3350 17 G: 17 POWDER, FOR SOLUTION ORAL at 08:34

## 2020-08-18 RX ADMIN — BISACODYL 10 MG: 5 TABLET, COATED ORAL at 18:12

## 2020-08-18 RX ADMIN — BISACODYL 10 MG: 5 TABLET, COATED ORAL at 20:08

## 2020-08-18 RX ADMIN — ATORVASTATIN CALCIUM 40 MG: 40 TABLET, FILM COATED ORAL at 08:34

## 2020-08-18 RX ADMIN — PANTOPRAZOLE SODIUM 40 MG: 40 INJECTION, POWDER, FOR SOLUTION INTRAVENOUS at 08:34

## 2020-08-18 RX ADMIN — AMLODIPINE BESYLATE 5 MG: 5 TABLET ORAL at 08:34

## 2020-08-18 RX ADMIN — MELATONIN 3 MG: at 22:36

## 2020-08-18 RX ADMIN — DILTIAZEM HYDROCHLORIDE 120 MG: 120 CAPSULE, COATED, EXTENDED RELEASE ORAL at 08:34

## 2020-08-18 RX ADMIN — POLYETHYLENE GLYCOL 3350, SODIUM SULFATE ANHYDROUS, SODIUM BICARBONATE, SODIUM CHLORIDE, POTASSIUM CHLORIDE 4000 ML: 236; 22.74; 6.74; 5.86; 2.97 POWDER, FOR SOLUTION ORAL at 18:12

## 2020-08-18 RX ADMIN — PANTOPRAZOLE SODIUM 40 MG: 40 INJECTION, POWDER, FOR SOLUTION INTRAVENOUS at 20:08

## 2020-08-18 RX ADMIN — BICALUTAMIDE 50 MG: 50 TABLET, FILM COATED ORAL at 08:34

## 2020-08-18 NOTE — PLAN OF CARE
Problem: PHYSICAL THERAPY ADULT  Goal: Performs mobility at highest level of function for planned discharge setting  See evaluation for individualized goals  Description: Treatment/Interventions: Functional transfer training, LE strengthening/ROM, Therapeutic exercise, Endurance training, Elevations, Gait training, Bed mobility          See flowsheet documentation for full assessment, interventions and recommendations  Outcome: Adequate for Discharge  Note: Prognosis: Good  Problem List: Decreased strength, Decreased mobility  Assessment: Pt presents to therapy today with reduced mobility, limited endurance, risk of falling  These impairments limit the patient by requiring some assistance for mobility  Pt would benefit from continued skilled therapy while in the hospital to improve overall mobility and work towards a safe d/c  Recommend home  At end of session patient was left seated with call bell within reach  Pt able to take himself to the bathroom at a I level with out an AD  Pt did not want to ambulate further due to bowel prep  PT educated about D/C from PT at this time  Barriers to Discharge: Decreased caregiver support     PT Discharge Recommendation: Return to previous environment with social support     PT - OK to Discharge: Yes    See flowsheet documentation for full assessment

## 2020-08-18 NOTE — ASSESSMENT & PLAN NOTE
Patient has history of ESRD per records from [facility], with baseline Cr 1 7 to 1 8  Did state history of temporary dialysis during prior hospitalization, however was not on regular hemodialysis afterward   Creatinine on presentation was 2 21 in the setting of anemia, however is now back to baseline at 1 67  - Continue to monitor creatinine  - Consider Nephrology consult as an outpatient

## 2020-08-18 NOTE — PROGRESS NOTES
INTERNAL MEDICINE RESIDENCY PROGRESS NOTE     Name: Opal Dinh   Age & Sex: 80 y o  male   MRN: [id number]  [facility]   Encounter: [id number]  Team: SOD Team B     PATIENT INFORMATION     Name: Opal Dinh   Age & Sex: 80 y o  male   MRN: [address] [place] Stay Days: 4    ASSESSMENT/PLAN     Principal Problem:    Anemia  Active Problems:    History of prostate cancer    Attention to nephrostomy (HCC)    HTN (hypertension)    HLD (hyperlipidemia)    Murmur, cardiac    History of AAA (abdominal aortic aneurysm) repair    Severe aortic stenosis    Moderate protein-calorie malnutrition ([address] )    Melena    Constipation    End stage renal disease ([address] )      End stage renal disease ([address] )  Assessment & Plan  Patient has history of ESRD per records from [facility], with baseline Cr 1 7 to 1 8  Did state history of temporary dialysis during prior hospitalization, however was not on regular hemodialysis afterward  Creatinine on presentation was 2 21 in the setting of anemia, however is now back to baseline at 1 67  - Continue to monitor creatinine  - Consider Nephrology consult as an outpatient    Constipation  Assessment & Plan  - Continue Miralax  - Continue to monitor     Melena  Assessment & Plan  Patient denies black stools since yesterday    Moderate protein-calorie malnutrition ([address] )  Assessment & Plan  Malnutrition Findings:   Malnutrition type: Chronic illness  Degree of Malnutrition: Malnutrition of moderate degree(18% wt loss x1 year, <75% energy intake compared to estimated energy needs for > 1 month, temples- slight depression  treatment= diet education/supplement)    BMI Findings: Body mass index is 19 78 kg/m²         Severe aortic stenosis  Assessment & Plan  Patient has a loud systolic ejection murmur   - Echo 8/16 showed severe aortic stenosis of trileaflet valve 0 9 cm², valve gradient was 39  - No signs and symptoms of heart failure currently and patient is euvolemic on exam  - Cardiothoracic surgery will set patient up in TAVR clinic  - Follow up with Cardiology outpatient      History of AAA (abdominal aortic aneurysm) repair  Assessment & Plan  Patient has history of AAA 2 years ago s/p EVAR with known endoleak per [facility] records  Patient with no abdominal or back pain at this time  - CT CAP 8/14 with 10 2 cm AAA s/p EVAR with no signs of rupture  - Consider this as possible cause of anemia  - Monitor symptoms    HLD (hyperlipidemia)  Assessment & Plan  Stable  - Continue Lipitor    HTN (hypertension)  Assessment & Plan  Stable  - Continue amlodipine, diltiazem    Attention to nephrostomy [facility])  Assessment & Plan  Patient had nephrostomy tube placed 1 year ago for 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  Per records from [facility], patient did have bilateral nephrostomy tubes placed, however now presents with only L side tube in place  - Patient reports no difficulties with nephrostomy at this time, denies any hematuria  - Patient does have urine output through the nephrostomy tube as well as normal urination through his penis at this time  - If any issues with nephrostomy develop, would consider consulting Urology  - Consider placement of R nephrostomy tube given mild R sided hydroureteronephrosis on CT CAP  - Monitor urine output    History of prostate cancer  Assessment & Plan  History of stage IV prostate cancer diagnosed 1 year ago, was following with Oncology at [facility]  He had bilateral nephrostomy tubes placed there due to obstructive nephropathy, however has only left nephrostomy tube in currently on presentation   Pt denies any prior history of chemotherapy or radiation  - Chest x-ray suggests infiltrative disease probably secondary to his cancer  - CT chest, abdomen, pelvis 8/14 with enlarged, irregularly contoured prostate gland consistent with prostate malignancy with possible extension into bladder; multiple sclerotic but also some lytic bone lesions consistent with metastasis; mild R hydroureteronephrosis; 10 2 cm AAA s/p EVAR with endoleak confirmed on records; and two ground glass pulmonary nodules likely 2/2 metastasis with recommended non contrast CT follow up in 6 to 12 months  - Patient would like to establish care with Oncology in the area, will consult while he is inpatient for further recommendations  - Follow up as an outpatient with Oncology    * Anemia  Assessment & Plan  Patient had severe symptomatic anemia on presentation with Hb 5 1 and microcytic RBCs  Pt given 3 u pRBCs since admission, now with Hb 7 9    Patient complained of melena yesterday, stating his stools were intermittently black  Rectal exam showed brown stools in the rectal vault  FOBT was positive  Given severe aortic stenosis, Heyde's syndrome is a possibility    - Consulted gastroenterology as patient has never had an EGD/colonoscopy in the past, given elevated BUN, concern for upper GI source, Heyde's? EGD/colonoscopy rescheduled for 8/19 to finish bowel prep  - Oncology consulted  SPEP negative for monoclonal bands   - INR 1 16, PTT 24  - vWF pending  - Hemoglobin checks q12h  - PPI IV bid  - Transfuse if hemoglobin less than 7        Disposition: Continue inpatient management     SUBJECTIVE     Patient seen and examined  No acute events overnight  He has not had any more black stools since yesterday  He states his weakness that he had initially has resolved after his transfusions   He denies chest pain, shortness of breath, abdominal pain, nausea/vomiting/diarrhea, dysuria, hematuria, or other new symptoms at this time    OBJECTIVE     Vitals:    08/17/20 0625 08/17/20 1521 08/17/20 2226 08/18/20 0713   BP: 119/51 (!) 116/42 (!) 116/43 133/53   BP Location:  Left arm     Pulse: (!) 50 (!) 54 60 64   Resp: 20 18 16 18   Temp: 99 °F (37 2 °C) 98 3 °F (36 8 °C) 99 2 °F (37 3 °C) 98 2 °F (36 8 °C)   TempSrc:  Oral     SpO2: 95% 96% 93% 96%   Weight:       Height:          Temperature:   Temp (24hrs), Av 6 °F (37 °C), Min:98 2 °F (36 8 °C), Max:99 2 °F (37 3 °C)    Temperature: 98 2 °F (36 8 °C)  Intake & Output:  I/O        07 -  0700  07 -  0700  07 -  0700    P  O  840 800 120    I V  (mL/kg)  946 3 (16)     Blood 300      Total Intake(mL/kg) 1140 (19 3) 1746 3 (29 6) 120 (2)    Urine (mL/kg/hr) 1115 (0 8) 2575 (1 8)     Stool  0     Total Output 1115 2575     Net +25 -828 8 +120           Unmeasured Stool Occurrence  1 x         Weights:   IBW: 68 4 kg    Body mass index is 19 78 kg/m²  Weight (last 2 days)     Date/Time   Weight    20 1152   59 (130 07)            Physical Exam  Constitutional:       General: He is not in acute distress  Appearance: Normal appearance  He is not toxic-appearing  HENT:      Head: Normocephalic and atraumatic  Right Ear: External ear normal       Left Ear: External ear normal       Nose: Nose normal       Mouth/Throat:      Mouth: Mucous membranes are moist       Pharynx: Oropharynx is clear  Eyes:      General: No scleral icterus  Right eye: No discharge  Left eye: No discharge  Extraocular Movements: Extraocular movements intact  Conjunctiva/sclera: Conjunctivae normal    Neck:      Musculoskeletal: Normal range of motion and neck supple  No muscular tenderness  Cardiovascular:      Rate and Rhythm: Normal rate and regular rhythm  Pulses: Normal pulses  Heart sounds: Murmur (Grade 2/6 systolic crescendo decrescendo murmur best heard at R 2nd intercostal space) present  No friction rub  No gallop  Pulmonary:      Effort: Pulmonary effort is normal       Breath sounds: Normal breath sounds  Abdominal:      General: Bowel sounds are normal  There is no distension  Palpations: Abdomen is soft  There is no mass  Tenderness:  There is no abdominal tenderness  There is no guarding  Genitourinary:     Comments: Left nephrostomy tube in place draining yellow urine  Musculoskeletal:         General: No swelling, tenderness or deformity  Lymphadenopathy:      Cervical: No cervical adenopathy  Skin:     General: Skin is warm and dry  Capillary Refill: Capillary refill takes less than 2 seconds  Neurological:      Mental Status: He is alert and oriented to person, place, and time  Mental status is at baseline  Psychiatric:         Mood and Affect: Mood normal          Thought Content: Thought content normal          Judgment: Judgment normal        LABORATORY DATA     Labs: I have personally reviewed pertinent reports  Results from last 7 days   Lab Units 08/18/20  0439 08/17/20  1425 08/17/20  0435  08/16/20  0607 08/15/20  1619   WBC Thousand/uL 5 90  --  6 45  --  5 71 6 60   HEMOGLOBIN g/dL 7 9* 8 4* 7 7*   < > 6 7* 7 9*   HEMATOCRIT % 26 6* 27 9* 25 3*   < > 22 9* 27 2*   PLATELETS Thousands/uL 201  --  196  --  202 216   NEUTROS PCT % 61  --   --   --  60 64   MONOS PCT % 9  --   --   --  11 11    < > = values in this interval not displayed  Results from last 7 days   Lab Units 08/18/20  0439 08/17/20  0435 08/16/20  0607  08/14/20  1104   POTASSIUM mmol/L 4 6 4 3 4 4   < > 4 8   CHLORIDE mmol/L 109* 110* 114*   < > 110*   CO2 mmol/L 29 26 26   < > 26   BUN mg/dL 34* 39* 42*   < > 46*   CREATININE mg/dL 1 67* 1 69* 1 93*   < > 2 21*   CALCIUM mg/dL 10 0 10 0 9 6   < > 9 6   ALK PHOS U/L 200*  --   --   --  196*   ALT U/L 13  --   --   --  14   AST U/L 23  --   --   --  35    < > = values in this interval not displayed       Results from last 7 days   Lab Units 08/15/20  0444   MAGNESIUM mg/dL 2 2     Results from last 7 days   Lab Units 08/15/20  0444   PHOSPHORUS mg/dL 3 1      Results from last 7 days   Lab Units 08/18/20  0439 08/17/20  1425   INR  1 16 1 13   PTT seconds  --  24         Results from last 7 days   Lab Units 08/14/20  1104   TROPONIN I ng/mL <0 02       IMAGING & DIAGNOSTIC TESTING     Radiology Results: I have personally reviewed pertinent reports  Ct Chest Abdomen Pelvis Wo Contrast    Result Date: 8/14/2020  Impression: Enlarged, irregular contoured prostate gland in keeping with history of prostate cancer, possibly extending into the bladder (series 2 images 67 through 74 ) Diffuse predominantly sclerotic but also some lytic bone metastases throughout the visualized skeleton  Mild right-sided hydroureteronephrosis  There is an abdominal aortic aneurysm status post bifurcated endovascular stent grafting  There is no evidence of rupture  The aneurysm sac is quite large at 10 2 cm  Possibility endoleak cannot be excluded without IV contrast  Severe emphysema  There are also 2 ground glass density pulmonary nodules, larger a 23 mm right upper lobe nodule  Based on current Fleischner Society 2017 Guidelines on incidental pulmonary nodule, followup noncontrast CT is recommended at 6-12 months from the initial examination to confirm persistence; if stable at that time, additional followup CT is recommended for every 2 years until 5 years of stability is demonstrated  Workstation performed: [id number]     Xr Chest 2 Views    Result Date: 8/14/2020  Impression: Numerous nodular density projecting over the lung fields, which could be pulmonary nodules or pleural plaques  There are also multiple suspected sclerotic bone lesions, for example in the inferior border of the right scapula  Recommend chest CT for further evaluation  The study was marked in EPIC for significant notification  Workstation performed: [id number]     Other Diagnostic Testing: I have personally reviewed pertinent reports      ACTIVE MEDICATIONS     Current Facility-Administered Medications   Medication Dose Route Frequency    acetaminophen (TYLENOL) tablet 650 mg  650 mg Oral Q6H PRN    amLODIPine (NORVASC) tablet 5 mg  5 mg Oral Daily    atorvastatin (LIPITOR) tablet 40 mg  40 mg Oral Daily    bicalutamide (CASODEX) tablet 50 mg  50 mg Oral Daily    diltiazem (CARDIZEM CD) 24 hr capsule 120 mg  120 mg Oral Daily    melatonin tablet 3 mg  3 mg Oral HS    pantoprazole (PROTONIX) injection 40 mg  40 mg Intravenous Q12H [address]    polyethylene glycol (GOLYTELY) bowel prep 4,000 mL  4,000 mL Oral See Admin Instructions    polyethylene glycol (MIRALAX) packet 17 g  17 g Oral Daily       VTE Pharmacologic Prophylaxis: Enoxaparin (Lovenox)  VTE Mechanical Prophylaxis: sequential compression device    Portions of the record may have been created with voice recognition software  Occasional wrong word or "sound a like" substitutions may have occurred due to the inherent limitations of voice recognition software    Read the chart carefully and recognize, using context, where substitutions have occurred   ==  [address]  MS4

## 2020-08-18 NOTE — ASSESSMENT & PLAN NOTE
Malnutrition Findings:   Malnutrition type: Chronic illness  Degree of Malnutrition: Malnutrition of moderate degree(18% wt loss x1 year, <75% energy intake compared to estimated energy needs for > 1 month, temples- slight depression  treatment= diet education/supplement)    BMI Findings: Body mass index is 19 77 kg/m²

## 2020-08-18 NOTE — PLAN OF CARE
Problem: Nutrition/Hydration-ADULT  Goal: Nutrient/Hydration intake appropriate for improving, restoring or maintaining nutritional needs  Description: Monitor and assess patient's nutrition/hydration status for malnutrition  Collaborate with interdisciplinary team and initiate plan and interventions as ordered  Monitor patient's weight and dietary intake as ordered or per policy  Utilize nutrition screening tool and intervene as necessary  Determine patient's food preferences and provide high-protein, high-caloric foods as appropriate       INTERVENTIONS:  - Monitor oral intake, urinary output, labs, and treatment plans  - Assess nutrition and hydration status and recommend course of action  - Evaluate amount of meals eaten  - Assist patient with eating if necessary   - Allow adequate time for meals  - Recommend/ encourage appropriate diets, oral nutritional supplements, and vitamin/mineral supplements  - Order, calculate, and assess calorie counts as needed  - Recommend, monitor, and adjust tube feedings and TPN/PPN based on assessed needs  - Assess need for intravenous fluids  - Provide specific nutrition/hydration education as appropriate  - Include patient/family/caregiver in decisions related to nutrition  Outcome: Progressing     Problem: PAIN - ADULT  Goal: Verbalizes/displays adequate comfort level or baseline comfort level  Description: Interventions:  - Encourage patient to monitor pain and request assistance  - Assess pain using appropriate pain scale  - Administer analgesics based on type and severity of pain and evaluate response  - Implement non-pharmacological measures as appropriate and evaluate response  - Consider cultural and social influences on pain and pain management  - Notify physician/advanced practitioner if interventions unsuccessful or patient reports new pain  Outcome: Progressing     Problem: SAFETY ADULT  Goal: Patient will remain free of falls  Description: INTERVENTIONS:  - Assess patient frequently for physical needs  -  Identify cognitive and physical deficits and behaviors that affect risk of falls    -  [place] fall precautions as indicated by assessment   - Educate patient/family on patient safety including physical limitations  - Instruct patient to call for assistance with activity based on assessment  - Modify environment to reduce risk of injury  - Consider OT/PT consult to assist with strengthening/mobility  Outcome: Progressing  Goal: Maintain or return to baseline ADL function  Description: INTERVENTIONS:  -  Assess patient's ability to carry out ADLs; assess patient's baseline for ADL function and identify physical deficits which impact ability to perform ADLs (bathing, care of mouth/teeth, toileting, grooming, dressing, etc )  - Assess/evaluate cause of self-care deficits   - Assess range of motion  - Assess patient's mobility; develop plan if impaired  - Assess patient's need for assistive devices and provide as appropriate  - Encourage maximum independence but intervene and supervise when necessary  - Involve family in performance of ADLs  - Assess for home care needs following discharge   - Consider OT consult to assist with ADL evaluation and planning for discharge  - Provide patient education as appropriate  Outcome: Progressing  Goal: Maintain or return mobility status to optimal level  Description: INTERVENTIONS:  - Assess patient's baseline mobility status (ambulation, transfers, stairs, etc )    - Identify cognitive and physical deficits and behaviors that affect mobility  - Identify mobility aids required to assist with transfers and/or ambulation (gait belt, sit-to-stand, lift, walker, cane, etc )  - [place] fall precautions as indicated by assessment  - Record patient progress and toleration of activity level on Mobility SBAR; progress patient to next Phase/Stage  - Instruct patient to call for assistance with activity based on assessment  - Consider rehabilitation consult to assist with strengthening/weightbearing, etc   Outcome: Progressing     Problem: Potential for Falls  Goal: Patient will remain free of falls  Description: INTERVENTIONS:  - Assess patient frequently for physical needs  -  Identify cognitive and physical deficits and behaviors that affect risk of falls    -  [place] fall precautions as indicated by assessment   - Educate patient/family on patient safety including physical limitations  - Instruct patient to call for assistance with activity based on assessment  - Modify environment to reduce risk of injury  - Consider OT/PT consult to assist with strengthening/mobility  Outcome: Progressing

## 2020-08-18 NOTE — ASSESSMENT & PLAN NOTE
Patient denies black stools since 8/17  - EGD 8/19 with small sliding hiatal hernia, otherwise no abnormality to suggest upper GI bleed

## 2020-08-18 NOTE — SOCIAL WORK
[facility] follow up:     ECIN message from [facility]: they are unable to accept d/t not on par with pt's insurance  ECIN sent to [facility]  [facility] accepting the patient for RN, PT/OT, MSW services  Per PT Gio Benítez, pt no longer needs home PT  CM entered VNA information into Epic and AVS follow up providers for RN and MSW home services  DC Plan - home transport via [facility]  [facility] for RN, MSW

## 2020-08-18 NOTE — QUICK NOTE
Patient seen and examined at bedside  No jam blood loss noted  Patient's hemoglobin has remained stable  No new subjective complaints  Initial plan was to perform EGD plus colonoscopy today and patient was put on NPO plus colonoscopy prep yesterday  Patient has not been able to consume adequate amount of laxative which likely result in a poor prep rendering the colonoscopy difficult/inaccurate  Plan:  Continue clear liquid diets today  NPO from midnight for EGD+colonoscopy tomorrow  Enforce taking the laxative  D/w with the patient, daughter Margarita Mittal and nurse  All parties aware

## 2020-08-18 NOTE — PHYSICAL THERAPY NOTE
Physical Therapy Treatment Note     Patient Name: Pina Tan    FSMFC'T Date: 8/18/2020     Problem List  Principal Problem:    Anemia  Active Problems:    History of prostate cancer    Attention to nephrostomy (HCC)    HTN (hypertension)    HLD (hyperlipidemia)    Murmur, cardiac    History of AAA (abdominal aortic aneurysm) repair    Severe aortic stenosis    Moderate protein-calorie malnutrition (HCC)    Melena    Constipation    End stage renal disease ([facility] [address] )       Past Medical History  Past Medical History:   Diagnosis Date    AAA (abdominal aortic aneurysm) (HCC)     s/p repair, now w/ aneurysm distal to repair    CKD (chronic kidney disease)     unknown baseline    Hyperlipidemia     Hypertension     Prostate CA ([facility] [address] )     s/p nephrostomy tube, w/ bone mets    Pulmonary nodule     23mm RUL    Severe aortic stenosis         Past Surgical History  History reviewed  No pertinent surgical history  08/18/20 1351   Pain Assessment   Pain Assessment Tool Pain Assessment not indicated - pt denies pain   Pain Score No Pain   Restrictions/Precautions   Weight Bearing Precautions Per Order No   Other Precautions Fall Risk   General   Chart Reviewed Yes   Family/Caregiver Present No   Cognition   Overall Cognitive Status WFL   Arousal/Participation Alert; Cooperative   Attention Within functional limits   Orientation Level Oriented X4   Memory Within functional limits   Following Commands Follows all commands and directions without difficulty   Bed Mobility   Additional Comments pt OOB at begining of the session   Transfers   Sit to Stand 7  Independent   Stand to Sit 7  Independent   Ambulation/Elevation   Gait pattern Foward flexed; Shuffling   Gait Assistance 5  Supervision   Assistive Device None   Distance 10ftx2   Balance   Static Sitting Fair +   Dynamic Sitting Fair +   Static Standing Fair   Dynamic Standing Evelyn Cummings 2913 Endurance Deficit   Endurance Deficit No   Activity Tolerance   Nurse Made Aware nurse approved therapy session   Exercises   Hip Flexion Sitting;Bilateral  (12 reps x 3 set s )   Knee AROM Long Arc Quad Sitting;Bilateral  (12 reps x 3 sets )   Ankle Pumps Bilateral;Standing  (30 reps x 3 sets heel raises )   Balance training  standing 10 reps x 3 set s   Assessment   Prognosis Good   Problem List Decreased strength;Decreased mobility   Assessment Pt presents to therapy today with reduced mobility, limited endurance, risk of falling  These impairments limit the patient by requiring some assistance for mobility  Pt would benefit from continued skilled therapy while in the hospital to improve overall mobility and work towards a safe d/c  Recommend home  At end of session patient was left seated with call bell within reach  Pt able to take himself to the bathroom at a I level with out an AD  Pt did not want to ambulate further due to bowel prep  PT educated about D/C from PT at this time  Barriers to Discharge Decreased caregiver support   Goals   STG Expiration Date 08/31/20   PT Treatment Day 1   Plan   Treatment/Interventions Functional transfer training;LE strengthening/ROM; Therapeutic exercise; Endurance training;Elevations;Gait training;Bed mobility   Progress Progressing toward goals   PT Frequency Other (Comment)  (3-5xwk)   Recommendation   PT Discharge Recommendation Return to previous environment with social support   PT - OK to Discharge Yes   Additional Comments when medically appropriate    Cammy Huang, Pt, DPT

## 2020-08-18 NOTE — PLAN OF CARE
Problem: OCCUPATIONAL THERAPY ADULT  Goal: Performs self-care activities at highest level of function for planned discharge setting  See evaluation for individualized goals  Description: Treatment Interventions: ADL retraining, Functional transfer training, Endurance training, Patient/family training, Equipment evaluation/education, Compensatory technique education, Energy conservation, Activityengagement  Equipment Recommended: (pt owns SC and BSC - recommend use)       See flowsheet documentation for full assessment, interventions and recommendations  Outcome: Completed  Note: Limitation: Decreased ADL status, Decreased endurance, Decreased self-care trans, Decreased high-level ADLs  Prognosis: Good  Assessment: Pt was seen this date for OT tx session focusing on self care tasks, sit to stand progressions, standing tolerance, tranfers, functional mobility, safety awareness, compensatory techniques, energy conservation, home d/c discussion and planning and overall activity tolerance  Pt presents  Seated OOB in chair  , completes previously mentioned tasks at documented assist levels please see above in flow sheet  Pt demonstrates good understanding and carryover of all education provided  Pt reports having support at home from his DTR who is currently not working, pt does state, " I wish I could have someone come in for assistance, just like 3 or 4 hours a day, but not really assistance more for like conversation"    Pt has made significant progress toward goals established in initial eval  Spoke to OTR/L, pt has no immediate OT needs at this time, goals have been addressed and recommendations have been made  Recommend pt continue to mobilize with NSG and restorative staff during inpatient stay  OT will sign off at this time       OT Discharge Recommendation: Home with skilled therapy(home OT and social support)

## 2020-08-18 NOTE — OCCUPATIONAL THERAPY NOTE
Occupational Therapy Treatment Note:     08/18/20 0930   Restrictions/Precautions   Weight Bearing Precautions Per Order No   Pain Assessment   Pain Score No Pain   ADL   Where Assessed Chair   Grooming Assistance 6  Modified Independent   Grooming Deficit Wash/dry hands   Grooming Comments standing at sink   LB Dressing Assistance 6  Modified independent   LB Dressing Deficit Don/doff R sock; Don/doff L sock   LB Dressing Comments seated   Toileting Assistance  6  Modified independent   Toileting Deficit Grab bar use   Toileting Comments stanard toilet   Functional Standing Tolerance   Time ~ 5 mins   Activity static standing    Comments SPC level   Bed Mobility   Additional Comments OOB upon presenataion and at end of session   Transfers   Sit to Stand 5  Supervision   Stand to Sit 5  Supervision   Stand pivot 5  Supervision   Additional Comments [facility]   Functional Mobility   Functional Mobility 5  Supervision   Additional Comments to and from bathroom   Additional items [facility]   Toilet Transfers   Toilet Transfer From [employer] Type To and from   Toilet Transfer to reBounces Transfers Comments grab bar use   Cognition   Overall Cognitive Status [facility]   Arousal/Participation Alert; Cooperative   Attention Within functional limits   Orientation Level Oriented X4   Memory Within functional limits   Following Commands Follows all commands and directions without difficulty   Comments Plesant and cooperative  Activity Tolerance   Activity Tolerance Patient tolerated treatment well   Medical Staff Made Aware NSG aware   Assessment   Assessment Pt was seen this date for OT tx session focusing on self care tasks, sit to stand progressions, standing tolerance, tranfers, functional mobility, safety awareness, compensatory techniques, energy conservation, home d/c discussion and planning and overall activity tolerance   Pt presents Seated OOB in chair  , completes previously mentioned tasks at documented assist levels please see above in flow sheet  Pt demonstrates good understanding and carryover of all education provided  Pt reports having support at home from his DTR who is currently not working, pt does state, " I wish I could have someone come in for assistance, just like 3 or 4 hours a day, but not really assistance more for like conversation"    Pt has made significant progress toward goals established in initial eval  Spoke to OTR/L, pt has no immediate OT needs at this time, goals have been addressed and recommendations have been made  Recommend pt continue to mobilize with NSG and restorative staff during inpatient stay  OT will sign off at this time     Plan   Treatment Interventions ADL retraining   Goal Expiration Date 08/31/20   OT Treatment Day 1   OT Frequency 3-5x/wk   Recommendation   OT Discharge Recommendation Home with skilled therapy  (home OT and social support)       Stella Vallejo, [address]

## 2020-08-19 ENCOUNTER — ANESTHESIA (INPATIENT)
Dept: GASTROENTEROLOGY | Facility: HOSPITAL | Age: 85
DRG: 378 | End: 2020-08-19
Payer: MEDICARE

## 2020-08-19 ENCOUNTER — APPOINTMENT (INPATIENT)
Dept: GASTROENTEROLOGY | Facility: HOSPITAL | Age: 85
DRG: 378 | End: 2020-08-19
Payer: MEDICARE

## 2020-08-19 VITALS
TEMPERATURE: 99 F | RESPIRATION RATE: 18 BRPM | HEART RATE: 68 BPM | DIASTOLIC BLOOD PRESSURE: 47 MMHG | BODY MASS INDEX: 19.7 KG/M2 | SYSTOLIC BLOOD PRESSURE: 117 MMHG | WEIGHT: 130 LBS | HEIGHT: 68 IN | OXYGEN SATURATION: 94 %

## 2020-08-19 PROBLEM — R06.02 SHORTNESS OF BREATH: Status: ACTIVE | Noted: 2020-08-19

## 2020-08-19 PROBLEM — K92.2 GI BLEEDING: Status: ACTIVE | Noted: 2020-08-19

## 2020-08-19 LAB
ANION GAP SERPL CALCULATED.3IONS-SCNC: 6 MMOL/L (ref 4–13)
BASOPHILS # BLD AUTO: 0.01 THOUSANDS/ΜL (ref 0–0.1)
BASOPHILS NFR BLD AUTO: 0 % (ref 0–1)
BUN SERPL-MCNC: 35 MG/DL (ref 5–25)
CALCIUM SERPL-MCNC: 9.7 MG/DL (ref 8.3–10.1)
CHLORIDE SERPL-SCNC: 110 MMOL/L (ref 100–108)
CO2 SERPL-SCNC: 28 MMOL/L (ref 21–32)
CREAT SERPL-MCNC: 1.67 MG/DL (ref 0.6–1.3)
EOSINOPHIL # BLD AUTO: 0.37 THOUSAND/ΜL (ref 0–0.61)
EOSINOPHIL NFR BLD AUTO: 6 % (ref 0–6)
ERYTHROCYTE [DISTWIDTH] IN BLOOD BY AUTOMATED COUNT: 23.1 % (ref 11.6–15.1)
GFR SERPL CREATININE-BSD FRML MDRD: 36 ML/MIN/1.73SQ M
GLUCOSE SERPL-MCNC: 83 MG/DL (ref 65–140)
HCT VFR BLD AUTO: 26.8 % (ref 36.5–49.3)
HGB BLD-MCNC: 7.8 G/DL (ref 12–17)
IMM GRANULOCYTES # BLD AUTO: 0.02 THOUSAND/UL (ref 0–0.2)
IMM GRANULOCYTES NFR BLD AUTO: 0 % (ref 0–2)
LYMPHOCYTES # BLD AUTO: 0.71 THOUSANDS/ΜL (ref 0.6–4.47)
LYMPHOCYTES NFR BLD AUTO: 12 % (ref 14–44)
MCH RBC QN AUTO: 22.9 PG (ref 26.8–34.3)
MCHC RBC AUTO-ENTMCNC: 29.1 G/DL (ref 31.4–37.4)
MCV RBC AUTO: 79 FL (ref 82–98)
MONOCYTES # BLD AUTO: 0.6 THOUSAND/ΜL (ref 0.17–1.22)
MONOCYTES NFR BLD AUTO: 10 % (ref 4–12)
NEUTROPHILS # BLD AUTO: 4.41 THOUSANDS/ΜL (ref 1.85–7.62)
NEUTS SEG NFR BLD AUTO: 72 % (ref 43–75)
NRBC BLD AUTO-RTO: 0 /100 WBCS
PLATELET # BLD AUTO: 210 THOUSANDS/UL (ref 149–390)
PMV BLD AUTO: 10.1 FL (ref 8.9–12.7)
POTASSIUM SERPL-SCNC: 4.2 MMOL/L (ref 3.5–5.3)
RBC # BLD AUTO: 3.4 MILLION/UL (ref 3.88–5.62)
SODIUM SERPL-SCNC: 144 MMOL/L (ref 136–145)
VWF:RCO ACT/NOR PPP PL AGG: 221 % (ref 50–200)
WBC # BLD AUTO: 6.12 THOUSAND/UL (ref 4.31–10.16)

## 2020-08-19 PROCEDURE — 80048 BASIC METABOLIC PNL TOTAL CA: CPT | Performed by: INTERNAL MEDICINE

## 2020-08-19 PROCEDURE — C9113 INJ PANTOPRAZOLE SODIUM, VIA: HCPCS | Performed by: INTERNAL MEDICINE

## 2020-08-19 PROCEDURE — 85025 COMPLETE CBC W/AUTO DIFF WBC: CPT | Performed by: INTERNAL MEDICINE

## 2020-08-19 PROCEDURE — 45330 DIAGNOSTIC SIGMOIDOSCOPY: CPT | Performed by: INTERNAL MEDICINE

## 2020-08-19 PROCEDURE — 0DJ08ZZ INSPECTION OF UPPER INTESTINAL TRACT, VIA NATURAL OR ARTIFICIAL OPENING ENDOSCOPIC: ICD-10-PCS | Performed by: INTERNAL MEDICINE

## 2020-08-19 PROCEDURE — 0DJD8ZZ INSPECTION OF LOWER INTESTINAL TRACT, VIA NATURAL OR ARTIFICIAL OPENING ENDOSCOPIC: ICD-10-PCS | Performed by: INTERNAL MEDICINE

## 2020-08-19 PROCEDURE — NC001 PR NO CHARGE: Performed by: INTERNAL MEDICINE

## 2020-08-19 RX ORDER — SODIUM CHLORIDE 9 MG/ML
INJECTION, SOLUTION INTRAVENOUS CONTINUOUS PRN
Status: DISCONTINUED | OUTPATIENT
Start: 2020-08-19 | End: 2020-08-19

## 2020-08-19 RX ORDER — LIDOCAINE HYDROCHLORIDE 10 MG/ML
INJECTION, SOLUTION EPIDURAL; INFILTRATION; INTRACAUDAL; PERINEURAL AS NEEDED
Status: DISCONTINUED | OUTPATIENT
Start: 2020-08-19 | End: 2020-08-19

## 2020-08-19 RX ORDER — PANTOPRAZOLE SODIUM 40 MG/1
40 INJECTION, POWDER, FOR SOLUTION INTRAVENOUS
Status: DISCONTINUED | OUTPATIENT
Start: 2020-08-20 | End: 2020-08-19 | Stop reason: HOSPADM

## 2020-08-19 RX ORDER — PROPOFOL 10 MG/ML
INJECTION, EMULSION INTRAVENOUS AS NEEDED
Status: DISCONTINUED | OUTPATIENT
Start: 2020-08-19 | End: 2020-08-19

## 2020-08-19 RX ADMIN — PANTOPRAZOLE SODIUM 40 MG: 40 INJECTION, POWDER, FOR SOLUTION INTRAVENOUS at 08:14

## 2020-08-19 RX ADMIN — SODIUM CHLORIDE: 0.9 INJECTION, SOLUTION INTRAVENOUS at 11:38

## 2020-08-19 RX ADMIN — PROPOFOL 20 MG: 10 INJECTION, EMULSION INTRAVENOUS at 11:49

## 2020-08-19 RX ADMIN — ACETAMINOPHEN 650 MG: 325 TABLET, FILM COATED ORAL at 08:13

## 2020-08-19 RX ADMIN — PHENYLEPHRINE HYDROCHLORIDE 100 MCG: 10 INJECTION INTRAVENOUS at 11:52

## 2020-08-19 RX ADMIN — PROPOFOL 20 MG: 10 INJECTION, EMULSION INTRAVENOUS at 11:45

## 2020-08-19 RX ADMIN — ATORVASTATIN CALCIUM 40 MG: 40 TABLET, FILM COATED ORAL at 08:13

## 2020-08-19 RX ADMIN — DILTIAZEM HYDROCHLORIDE 120 MG: 120 CAPSULE, COATED, EXTENDED RELEASE ORAL at 08:13

## 2020-08-19 RX ADMIN — PROPOFOL 40 MG: 10 INJECTION, EMULSION INTRAVENOUS at 11:43

## 2020-08-19 RX ADMIN — AMLODIPINE BESYLATE 5 MG: 5 TABLET ORAL at 08:13

## 2020-08-19 RX ADMIN — BICALUTAMIDE 50 MG: 50 TABLET, FILM COATED ORAL at 08:13

## 2020-08-19 RX ADMIN — PHENYLEPHRINE HYDROCHLORIDE 200 MCG: 10 INJECTION INTRAVENOUS at 11:43

## 2020-08-19 RX ADMIN — LIDOCAINE HYDROCHLORIDE 50 MG: 10 INJECTION, SOLUTION EPIDURAL; INFILTRATION; INTRACAUDAL; PERINEURAL at 11:43

## 2020-08-19 NOTE — SOCIAL WORK
VNA barrier for dc plan:     CM received call from Francois Menjivar at [facility]  She reported that d/t pt's [employer], they are unable to accept pt for [place] [address] services  CM reviewed pt's insurance; pt has [employer] [address] [place] [address] / medicaid  CM met with the patient, explained his VNA ineligibility d/t out of state  Pt reported that he has lived with his Dtr in [place] for the past 3 months and he has a Dtr in [facility]  CM asked which state pt will stay in for now; pt replied [place]  CM advised the patient to change medicare to [place] and then pursue VNA services; pt expressed understanding and stated that he and his daughter will work on [employer] together and asap  CM advised SOD B resident Dr René Diaz and RN Sommer Nix  Per Umang Diaz, pt is medically cleared for dc, daughter given update and enroute to  the patient  DC Plan - transport home via Daughter  VNA services not available / covered until pt changes medicare from [place] to [place]  Pt denied any other dc needs at this time

## 2020-08-19 NOTE — ANESTHESIA POSTPROCEDURE EVALUATION
Post-Op Assessment Note    CV Status:  Stable  Pain Score: 0    Pain management: adequate     Mental Status:  Alert and awake   Hydration Status:  Euvolemic   PONV Controlled:  Controlled   Airway Patency:  Patent      Post Op Vitals Reviewed: Yes      Staff: CRNA, Anesthesiologist         No complications documented      /63 (08/19/20 1203)    Temp      Pulse 60 (08/19/20 1203)   Resp 18 (08/19/20 1203)    SpO2 100 % (08/19/20 1203)

## 2020-08-19 NOTE — PROGRESS NOTES
Re-attempted to encourage patient to drink bowel prep  Patient kept saying "that's too much, i'm not doing all of it, I'll do a little but that's it " Encouraged patient to drink as much as he could, even if it's a little bit at at time  Patient drank a few sips in front of writer and said "i'll drink half this cup and that's it, it's too much " Re-educated patient on importance of drinking bowl prep to be able to have an accurate colonoscopy performed

## 2020-08-19 NOTE — ANESTHESIA PREPROCEDURE EVALUATION
Procedure:  EGD  COLONOSCOPY    Relevant Problems   ANESTHESIA (within normal limits)      CARDIO   (+) HLD (hyperlipidemia)   (+) HTN (hypertension)   (+) Murmur, cardiac   (+) Severe aortic stenosis      ENDO (within normal limits)      GI/HEPATIC   (+) GI bleeding      /RENAL   (+) Chronic kidney disease   (+) End stage renal disease (HCC)      HEMATOLOGY   (+) Anemia      MUSCULOSKELETAL (within normal limits)      NEURO/PSYCH   (+) History of prostate cancer        Physical Exam    Airway    Mallampati score: II  TM Distance: >3 FB  Neck ROM: full     Dental   No notable dental hx     Cardiovascular      Pulmonary      Other Findings      Echo:  Anesthesia Plan  ASA Score- 4     Anesthesia Type- IV sedation with anesthesia with ASA Monitors  Additional Monitors:   Airway Plan:           Plan Factors-    Chart reviewed  EKG reviewed  Existing labs reviewed  Patient summary reviewed  Patient is not a current smoker  Patient did not smoke on day of surgery  Induction-     Postoperative Plan-     Informed Consent- Anesthetic plan and risks discussed with patient  I personally reviewed this patient with the CRNA  Discussed and agreed on the Anesthesia Plan with the CRNA  Lajean Cassette LEFT VENTRICLE:  Systolic function was normal  Ejection fraction was estimated to be 60 %  There were no regional wall motion abnormalities  Wall thickness was increased      LEFT ATRIUM:  The atrium was mildly dilated      MITRAL VALVE:  There was moderate annular calcification  There was trace regurgitation      AORTIC VALVE:  The valve was trileaflet  Leaflets exhibited markedly increased thickness and markedly reduced cuspal separation  There was severe stenosis  There was mild regurgitation  Valve mean gradient was 39 mmHg  Aortic valve obstructive index (by Vmax) was 0 24  Estimated aortic valve area (by Vmax) was 0 91 cmï¾²

## 2020-08-19 NOTE — NURSING NOTE
When asked if allowed to attempt for a new IV, patient said "only one more time and that's it"  Critical care RN Wilmer Turner, attempted and unsuccessful  SOD made aware

## 2020-08-19 NOTE — PROGRESS NOTES
After multiple attempts via different nurses, unable to obtain successful IV access in patient  Patient educated on importance of changing IV site every 4 days, and patient is refusing additional attempts for a new IV  SOD made aware

## 2020-08-19 NOTE — DISCHARGE SUMMARY
INTERNAL MEDICINE RESIDENCY DISCHARGE SUMMARY     Govind Barragan   80 y o  male  MRN: [id number]  Room/Bed: [facility] 913/[facility] 913-01     [address] [place] [address]   Encounter: [id number]    Principal Problem:    Anemia  Active Problems:    History of prostate cancer    Attention to nephrostomy (HCC)    HTN (hypertension)    HLD (hyperlipidemia)    Murmur, cardiac    History of AAA (abdominal aortic aneurysm) repair    Severe aortic stenosis    Moderate protein-calorie malnutrition (HCC)    Melena    Constipation    End stage renal disease (HCC)    GI bleeding    Shortness of breath      GI bleeding  Assessment & Plan  See "Anemia"    End stage renal disease ([facility] [address] )  Assessment & Plan  Patient has history of ESRD per records from [facility], with baseline Cr 1 7 to 1 8  Did state history of temporary dialysis during prior hospitalization, however was not on regular hemodialysis afterward  Creatinine on presentation was 2 21 in the setting of anemia, however is now back to baseline at 1 67  - Continue to monitor creatinine  - Consider Nephrology consult as an outpatient    Constipation  Assessment & Plan  - Continue Miralax  - Continue to monitor     Melena  Assessment & Plan  Patient denies black stools since 8/17  - EGD 8/19 with small sliding hiatal hernia, otherwise no abnormality to suggest upper GI bleed    Moderate protein-calorie malnutrition (HCC)  Assessment & Plan  Malnutrition Findings:   Malnutrition type: Chronic illness  Degree of Malnutrition: Malnutrition of moderate degree(18% wt loss x1 year, <75% energy intake compared to estimated energy needs for > 1 month, temples- slight depression  treatment= diet education/supplement)    BMI Findings: Body mass index is 19 77 kg/m²         Severe aortic stenosis  Assessment & Plan  Patient has a loud systolic ejection murmur   - Echo 8/16 showed severe aortic stenosis of trileaflet valve 0 9 cm², valve gradient was 39  - No signs and symptoms of heart failure currently and patient is euvolemic on exam  - Cardiothoracic surgery will set patient up in TAVR clinic  - Follow up with Cardiology outpatient      History of AAA (abdominal aortic aneurysm) repair  Assessment & Plan  Patient has history of AAA 2 years ago s/p EVAR with known endoleak per [facility] records  Patient with no abdominal or back pain at this time  - CT CAP 8/14 with 10 2 cm AAA s/p EVAR with no signs of rupture  - Consider this as possible cause of anemia  - Monitor symptoms    HLD (hyperlipidemia)  Assessment & Plan  Stable  - Continue Lipitor    HTN (hypertension)  Assessment & Plan  Stable  - Continue amlodipine, diltiazem    Attention to nephrostomy [facility])  Assessment & Plan  Patient had nephrostomy tube placed 1 year ago for 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  Per records from [facility], patient did have bilateral nephrostomy tubes placed, however now presents with only L side tube in place  - Patient reports no difficulties with nephrostomy at this time, denies any hematuria  - Patient does have urine output through the nephrostomy tube as well as normal urination through his penis at this time  - If any issues with nephrostomy develop, would consider consulting Urology  - Consider placement of R nephrostomy tube given mild R sided hydroureteronephrosis on CT CAP  - Monitor urine output    History of prostate cancer  Assessment & Plan  History of stage IV prostate cancer diagnosed 1 year ago, was following with Oncology at [facility]  He had bilateral nephrostomy tubes placed there due to obstructive nephropathy, however has only left nephrostomy tube in currently on presentation   Pt denies any prior history of chemotherapy or radiation  - Chest x-ray suggests infiltrative disease probably secondary to his cancer  - CT chest, abdomen, pelvis 8/14 with enlarged, irregularly contoured prostate gland consistent with prostate malignancy with possible extension into bladder; multiple sclerotic but also some lytic bone lesions consistent with metastasis; mild R hydroureteronephrosis; 10 2 cm AAA s/p EVAR with endoleak confirmed on records; and two ground glass pulmonary nodules likely 2/2 metastasis with recommended non contrast CT follow up in 6 to 12 months  - Patient would like to establish care with Oncology in the area, will consult while he is inpatient for further recommendations  - Follow up as an outpatient with Oncology    * Anemia  Assessment & Plan  Patient had severe symptomatic anemia on presentation with Hb 5 1 and microcytic RBCs  Pt given 3 u pRBCs since admission, now with Hb 7 8    Patient complained of melena on 8/17, stating his stools were intermittently black  Rectal exam showed brown stools in the rectal vault  FOBT was positive  Given severe aortic stenosis, Heyde's syndrome was considered, however likely ruled out due to elevated von Willebrand factor activity    - Consulted gastroenterology as patient has never had an EGD/colonoscopy in the past, given elevated BUN, concern for upper GI source, Heyde's?  - EGD 8/19 with small sliding hiatal hernia, otherwise normal  - Colonoscopy 8/19 aborted due to large amount of liquid stool in rectosigmoid colon  - Will obtain colonoscopy outpatient in 4 to 6 weeks  - Oncology consulted   SPEP negative for monoclonal bands   - INR 1 16, PTT 24  - vWF elevated at 221  - Hemoglobin checks q12h  - PPI IV bid  - Transfuse if hemoglobin less than 400 [facility] is an 79 yo M with PMH of stage IV prostate adenocarcinoma diagnosed 1 year ago, ESRD, s/p nephrostomy tube for obstructive uropathy 2/2 prostate cancer, AAA s/p EVAR with known endoleak, HLD, and HTN who presented to [facility] on 8/14 with one week of generalized weakness and shortness of breath with exertion  He had history of anemia in the past requiring two episodes of transfusion  He was found to have hemoglobin of 5 1 in the ED and was given 2 units pRBCs, and was admitted for symptomatic anemia  Patient eventually received 1 more unit pRBC for a total of 3 units transfused this admission  CXR revealed numerous nodular densities in the lungs and multiple suspected sclerotic bone lesions  CT CAP showed the same in addition to severe emphysema, AAA s/p EVAR at 10 2 cm with no evidence of rupture, enlarged, irregularly contoured prostate gland with possible mass extension into bladder, and mild R hydroureteronephrosis  Of note, patient had bilateral nephrostomy tubes placed at [facility], however on presentation he only had left nephrostomy tube in place    Given known endoleak per records from [facility] and no evidence for rupture on CT, AAA rupture was ruled out as cause for anemia  Oncology was consulted, and SPEP workup was negative for monoclonal bands  Coagulation labs were WNL  Patient was noted to have a loud systolic murmur on physical exam, and workup with Echo revealed severe aortic stenosis with no signs of heart failure or volume overload  Patient did complain of melena on 8/17, with rectal exam showing brown stool however FOBT was positive  Given new diagnosis of severe aortic stenosis, Heyde's syndrome was suspected and EGD/colonoscopy and von Willebrand factor function panel were ordered  EGD was negative for upper GI bleed  Colonoscopy was aborted due to inadequate bowel prep and will be completed outpatient  VWF activity was high at 221, ruling out Heyde's syndrome  Since 8/17, patient had no more episodes of melena or hematochezia  No other sites of active bleeding were identified, and hemoglobin stabilized around 7 8 by discharge    DISCHARGE INFORMATION     PCP at Discharge: None   Patient was given information to establish with PCP here    Admitting Provider: Linette Irizarry MD  Admission Date: 8/14/2020    Discharge Provider: Brad Lackey MD  Discharge Date: 8/19/2020    Discharge Disposition: Final discharge disposition not confirmed  Discharge Condition: stable  Discharge with Lines: no        Discharge Diet: regular diet  Activity Restrictions: activity as tolerated  Test Results Pending at Discharge: none    Discharge Diagnoses:  Principal Problem:    Anemia  Active Problems:    History of prostate cancer    Attention to nephrostomy (HCC)    HTN (hypertension)    HLD (hyperlipidemia)    Murmur, cardiac    History of AAA (abdominal aortic aneurysm) repair    Severe aortic stenosis    Moderate protein-calorie malnutrition (HCC)    Melena    Constipation    End stage renal disease (HCC)    GI bleeding    Shortness of breath  Resolved Problems:    * No resolved hospital problems  *      Consulting Providers:      Diagnostic & Therapeutic Procedures Performed:  Ct Chest Abdomen Pelvis Wo Contrast    Result Date: 8/14/2020  Impression: Enlarged, irregular contoured prostate gland in keeping with history of prostate cancer, possibly extending into the bladder (series 2 images 67 through 74 ) Diffuse predominantly sclerotic but also some lytic bone metastases throughout the visualized skeleton  Mild right-sided hydroureteronephrosis  There is an abdominal aortic aneurysm status post bifurcated endovascular stent grafting  There is no evidence of rupture  The aneurysm sac is quite large at 10 2 cm  Possibility endoleak cannot be excluded without IV contrast  Severe emphysema  There are also 2 ground glass density pulmonary nodules, larger a 23 mm right upper lobe nodule  Based on current Fleischner Society 2017 Guidelines on incidental pulmonary nodule, followup noncontrast CT is recommended at 6-12 months from the initial examination to confirm persistence; if stable at that time, additional followup CT is recommended for every 2 years until 5 years of stability is demonstrated  Workstation performed: [id number]     Xr Chest 2 Views    Result Date: 8/14/2020  Impression: Numerous nodular density projecting over the lung fields, which could be pulmonary nodules or pleural plaques  There are also multiple suspected sclerotic bone lesions, for example in the inferior border of the right scapula  Recommend chest CT for further evaluation  The study was marked in EPIC for significant notification  Workstation performed: [id number]       Code Status: Level 3 - DNAR and DNI  Advance Directive & Living Will: <no information>  Power of :    POLST:      Medications:  Current Discharge Medication List        Current Discharge Medication List        Current Discharge Medication List      CONTINUE these medications which have NOT CHANGED    Details   amLODIPine (NORVASC) 5 mg tablet Take 5 mg by mouth daily      atorvastatin (LIPITOR) 40 mg tablet Take 40 mg by mouth daily      b complex-C-folic acid (NEPHRO-YONIS) 0 8 mg tablet Take 0 8 mg by mouth daily with dinner      bicalutamide (CASODEX) 50 mg tablet Take 50 mg by mouth daily      calcitriol (ROCALTROL) 0 25 mcg capsule Take 0 25 mcg by mouth daily      diltiazem (dilTIAZem CD) 120 mg 24 hr capsule Take 120 mg by mouth daily             Allergies:  No Known Allergies    FOLLOW-UP     PCP Outpatient Follow-up:  Yes, establish and follow up with PCP    Consulting Providers Follow-up:  Yes, follow up with GI outpatient for colonoscopy  Follow up with oncology outpatient for stage IV prostate cancer  Follow up with Cardiothoracic surgery outpatient for assessment for TAVR  Follow up with Cardiology outpatient  Follow up with Nephrology outpatient for ESRD    Active Issues Requiring Follow-up:   Yes, possible GI bleed, severe aortic stenosis, stage IV prostate adenocarcinoma with likely mets to bone, lungs, so on    Discharge Statement:   I spent 30 minutes minutes discharging the patient  This time was spent on the day of discharge   I had direct contact with the patient on the day of discharge  Additional documentation is required if more than 30 minutes were spent on discharge  Portions of the record may have been created with voice recognition software  Occasional wrong word or "sound a like" substitutions may have occurred due to the inherent limitations of voice recognition software    Read the chart carefully and recognize, using context, where substitutions have occurred     ==  [address]  [place]

## 2020-08-19 NOTE — PROGRESS NOTES
INTERNAL MEDICINE RESIDENCY PROGRESS NOTE     Name: Brian Mcmillan   Age & Sex: 80 y o  male   MRN: [id number]  Unit/Bed#: [facility] 913-01   Encounter: [id number]  Team: SOD Team B     PATIENT INFORMATION     Name: Brian Mcmillan   Age & Sex: 80 y o  male   MRN: [address] Stay Days: 5    ASSESSMENT/PLAN     Principal Problem:    Anemia  Active Problems:    History of prostate cancer    Attention to nephrostomy (HCC)    HTN (hypertension)    HLD (hyperlipidemia)    Murmur, cardiac    History of AAA (abdominal aortic aneurysm) repair    Severe aortic stenosis    Moderate protein-calorie malnutrition (HCC)    Melena    Constipation    End stage renal disease ([facility] [address] )    GI bleeding    Shortness of breath      GI bleeding  Assessment & Plan  See "Anemia"    End stage renal disease ([facility] [address] )  Assessment & Plan  Patient has history of ESRD per records from [facility], with baseline Cr 1 7 to 1 8  Did state history of temporary dialysis during prior hospitalization, however was not on regular hemodialysis afterward  Creatinine on presentation was 2 21 in the setting of anemia, however is now back to baseline at 1 67  - Continue to monitor creatinine  - Consider Nephrology consult as an outpatient    Constipation  Assessment & Plan  - Continue Miralax  - Continue to monitor     Melena  Assessment & Plan  Patient denies black stools since 8/17  - EGD 8/19 with small sliding hiatal hernia, otherwise no abnormality to suggest upper GI bleed    Moderate protein-calorie malnutrition (HCC)  Assessment & Plan  Malnutrition Findings:   Malnutrition type: Chronic illness  Degree of Malnutrition: Malnutrition of moderate degree(18% wt loss x1 year, <75% energy intake compared to estimated energy needs for > 1 month, temples- slight depression  treatment= diet education/supplement)    BMI Findings: Body mass index is 19 77 kg/m²         Severe aortic stenosis  Assessment & Plan  Patient has a loud systolic ejection murmur   - Echo 8/16 showed severe aortic stenosis of trileaflet valve 0 9 cm², valve gradient was 39  - No signs and symptoms of heart failure currently and patient is euvolemic on exam  - Cardiothoracic surgery will set patient up in TAVR clinic  - Follow up with Cardiology outpatient      History of AAA (abdominal aortic aneurysm) repair  Assessment & Plan  Patient has history of AAA 2 years ago s/p EVAR with known endoleak per [facility] records  Patient with no abdominal or back pain at this time  - CT CAP 8/14 with 10 2 cm AAA s/p EVAR with no signs of rupture  - Consider this as possible cause of anemia  - Monitor symptoms    HLD (hyperlipidemia)  Assessment & Plan  Stable  - Continue Lipitor    HTN (hypertension)  Assessment & Plan  Stable  - Continue amlodipine, diltiazem    Attention to nephrostomy [facility])  Assessment & Plan  Patient had nephrostomy tube placed 1 year ago for 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  Per records from [facility], patient did have bilateral nephrostomy tubes placed, however now presents with only L side tube in place  - Patient reports no difficulties with nephrostomy at this time, denies any hematuria  - Patient does have urine output through the nephrostomy tube as well as normal urination through his penis at this time  - If any issues with nephrostomy develop, would consider consulting Urology  - Consider placement of R nephrostomy tube given mild R sided hydroureteronephrosis on CT CAP  - Monitor urine output    History of prostate cancer  Assessment & Plan  History of stage IV prostate cancer diagnosed 1 year ago, was following with Oncology at [facility]  He had bilateral nephrostomy tubes placed there due to obstructive nephropathy, however has only left nephrostomy tube in currently on presentation   Pt denies any prior history of chemotherapy or radiation  - Chest x-ray suggests infiltrative disease probably secondary to his cancer  - CT chest, abdomen, pelvis 8/14 with enlarged, irregularly contoured prostate gland consistent with prostate malignancy with possible extension into bladder; multiple sclerotic but also some lytic bone lesions consistent with metastasis; mild R hydroureteronephrosis; 10 2 cm AAA s/p EVAR with endoleak confirmed on records; and two ground glass pulmonary nodules likely 2/2 metastasis with recommended non contrast CT follow up in 6 to 12 months  - Patient would like to establish care with Oncology in the area, will consult while he is inpatient for further recommendations  - Follow up as an outpatient with Oncology    * Anemia  Assessment & Plan  Patient had severe symptomatic anemia on presentation with Hb 5 1 and microcytic RBCs  Pt given 3 u pRBCs since admission, now with Hb 7 8    Patient complained of melena on 8/17, stating his stools were intermittently black  Rectal exam showed brown stools in the rectal vault  FOBT was positive  Given severe aortic stenosis, Heyde's syndrome is a possibility    - Consulted gastroenterology as patient has never had an EGD/colonoscopy in the past, given elevated BUN, concern for upper GI source, Heyde's?  - EGD 8/19 with small sliding hiatal hernia, otherwise normal  - Colonoscopy 8/19 aborted due to large amount of liquid stool in rectosigmoid colon  - Repeat bowel prep for repeat colonoscopy if pt is agreeable, will discuss  Clear liquid diet  - Oncology consulted  SPEP negative for monoclonal bands   - INR 1 16, PTT 24  - vWF pending  - Hemoglobin checks q12h  - PPI IV bid  - Transfuse if hemoglobin less than 7        Disposition: Continue inpatient management     SUBJECTIVE     Patient seen and examined  No acute events overnight  Patient states he finished bowel prep, denies melena or hematochezia at this time  He endorses some back pain   No other new symptoms at this time    OBJECTIVE     Vitals: 20 0651 20 1101 20 1203 20 1218   BP: 123/53 124/61 135/63 127/59   Pulse: 61 69 60 62   Resp: (!)    Temp: 99 7 °F (37 6 °C) 97 8 °F (36 6 °C)     TempSrc:  Tympanic     SpO2: 91% 97% 100% 96%   Weight:  59 kg (130 lb)     Height:  5' 8" (1 727 m)        Temperature:   Temp (24hrs), Av 3 °F (36 8 °C), Min:97 5 °F (36 4 °C), Max:99 7 °F (37 6 °C)    Temperature: 97 8 °F (36 6 °C)  Intake & Output:  I/O        07 -  0700  07 -  0700 701 -  0700    P  O  800 240 0    I V  (mL/kg) 946 3 (16)  250 (4 2)    Blood       Total Intake(mL/kg) 1746 3 (29 6) 240 (4 1) 250 (4 2)    Urine (mL/kg/hr) 2575 (1 8) 450 (0 3) 250 (0 8)    Stool 0      Total Output 2575 450 250    Net -828 8 -210 0           Unmeasured Urine Occurrence  3 x     Unmeasured Stool Occurrence 1 x          Weights:   IBW: 68 4 kg    Body mass index is 19 77 kg/m²  Weight (last 2 days)     Date/Time   Weight    20 1101   59 (130)            Physical Exam  Constitutional:       General: He is not in acute distress  Appearance: Normal appearance  He is not toxic-appearing  HENT:      Head: Normocephalic and atraumatic  Right Ear: External ear normal       Left Ear: External ear normal       Nose: Nose normal       Mouth/Throat:      Mouth: Mucous membranes are moist       Pharynx: Oropharynx is clear  Eyes:      General: No scleral icterus  Right eye: No discharge  Left eye: No discharge  Extraocular Movements: Extraocular movements intact  Conjunctiva/sclera: Conjunctivae normal    Neck:      Musculoskeletal: Neck supple  Cardiovascular:      Rate and Rhythm: Normal rate and regular rhythm  Pulses: Normal pulses  Heart sounds: Murmur (Grade 2/6 systolic crescendo decrescendo murmur best heard at R 2nd intercostal space) present  No friction rub  No gallop      Pulmonary:      Effort: Pulmonary effort is normal       Breath sounds: Normal breath sounds  Abdominal:      General: Bowel sounds are normal  There is no distension  Palpations: Abdomen is soft  Tenderness: There is no abdominal tenderness  Genitourinary:     Comments: Left nephrostomy tube in place  Musculoskeletal:         General: No swelling, tenderness or deformity  Comments: Patient states he has swelling of the legs, however no edema appreciated on exam   Lymphadenopathy:      Cervical: No cervical adenopathy  Skin:     General: Skin is warm and dry  Capillary Refill: Capillary refill takes less than 2 seconds  Neurological:      Mental Status: He is alert and oriented to person, place, and time  Mental status is at baseline  Psychiatric:         Mood and Affect: Mood normal          Thought Content: Thought content normal          Judgment: Judgment normal        LABORATORY DATA     Labs: I have personally reviewed pertinent reports  Results from last 7 days   Lab Units 08/19/20  0510 08/18/20  1621 08/18/20  0439  08/17/20  0435  08/16/20  0607   WBC Thousand/uL 6 12  --  5 90  --  6 45  --  5 71   HEMOGLOBIN g/dL 7 8* 9 0* 7 9*   < > 7 7*   < > 6 7*   HEMATOCRIT % 26 8* 31 7* 26 6*   < > 25 3*   < > 22 9*   PLATELETS Thousands/uL 210  --  201  --  196  --  202   NEUTROS PCT % 72  --  61  --   --   --  60   MONOS PCT % 10  --  9  --   --   --  11    < > = values in this interval not displayed  Results from last 7 days   Lab Units 08/19/20  0510 08/18/20  0439 08/17/20  0435  08/14/20  1104   POTASSIUM mmol/L 4 2 4 6 4 3   < > 4 8   CHLORIDE mmol/L 110* 109* 110*   < > 110*   CO2 mmol/L 28 29 26   < > 26   BUN mg/dL 35* 34* 39*   < > 46*   CREATININE mg/dL 1 67* 1 67* 1 69*   < > 2 21*   CALCIUM mg/dL 9 7 10 0 10 0   < > 9 6   ALK PHOS U/L  --  200*  --   --  196*   ALT U/L  --  13  --   --  14   AST U/L  --  23  --   --  35    < > = values in this interval not displayed       Results from last 7 days   Lab Units 08/15/20  0444   MAGNESIUM mg/dL 2 2     Results from last 7 days   Lab Units 08/15/20  0444   PHOSPHORUS mg/dL 3 1      Results from last 7 days   Lab Units 08/18/20  0439 08/17/20  1425   INR  1 16 1 13   PTT seconds  --  24         Results from last 7 days   Lab Units 08/14/20  1104   TROPONIN I ng/mL <0 02       IMAGING & DIAGNOSTIC TESTING     Radiology Results: I have personally reviewed pertinent reports  Ct Chest Abdomen Pelvis Wo Contrast    Result Date: 8/14/2020  Impression: Enlarged, irregular contoured prostate gland in keeping with history of prostate cancer, possibly extending into the bladder (series 2 images 67 through 74 ) Diffuse predominantly sclerotic but also some lytic bone metastases throughout the visualized skeleton  Mild right-sided hydroureteronephrosis  There is an abdominal aortic aneurysm status post bifurcated endovascular stent grafting  There is no evidence of rupture  The aneurysm sac is quite large at 10 2 cm  Possibility endoleak cannot be excluded without IV contrast  Severe emphysema  There are also 2 ground glass density pulmonary nodules, larger a 23 mm right upper lobe nodule  Based on current Fleischner Society 2017 Guidelines on incidental pulmonary nodule, followup noncontrast CT is recommended at 6-12 months from the initial examination to confirm persistence; if stable at that time, additional followup CT is recommended for every 2 years until 5 years of stability is demonstrated  Workstation performed: [id number]     Xr Chest 2 Views    Result Date: 8/14/2020  Impression: Numerous nodular density projecting over the lung fields, which could be pulmonary nodules or pleural plaques  There are also multiple suspected sclerotic bone lesions, for example in the inferior border of the right scapula  Recommend chest CT for further evaluation  The study was marked in EPIC for significant notification   Workstation performed: [id number]     Other Diagnostic Testing: I have personally reviewed pertinent reports  ACTIVE MEDICATIONS     Current Facility-Administered Medications   Medication Dose Route Frequency    acetaminophen (TYLENOL) tablet 650 mg  650 mg Oral Q6H PRN    amLODIPine (NORVASC) tablet 5 mg  5 mg Oral Daily    atorvastatin (LIPITOR) tablet 40 mg  40 mg Oral Daily    bicalutamide (CASODEX) tablet 50 mg  50 mg Oral Daily    diltiazem (CARDIZEM CD) 24 hr capsule 120 mg  120 mg Oral Daily    melatonin tablet 3 mg  3 mg Oral HS    pantoprazole (PROTONIX) injection 40 mg  40 mg Intravenous Q12H [address]    polyethylene glycol (MIRALAX) packet 17 g  17 g Oral Daily       VTE Pharmacologic Prophylaxis: Enoxaparin (Lovenox)  VTE Mechanical Prophylaxis: sequential compression device    Portions of the record may have been created with voice recognition software  Occasional wrong word or "sound a like" substitutions may have occurred due to the inherent limitations of voice recognition software    Read the chart carefully and recognize, using context, where substitutions have occurred   ==  [address]  MS4

## 2020-08-19 NOTE — RESTORATIVE TECHNICIAN NOTE
Restorative Specialist Mobility Note       Activity: Ambulate in ga, Ambulate in room, Bathroom privileges, Chair, Dangle, Stand at bedside(Educated/encouraged pt to ambulate with assistance 3-4 x's/day  Bed alarm on   Pt callbell, phone/tray within reach )     Assistive Device: Betsy NOWAK, Restorative Technician, [employer]

## 2020-08-19 NOTE — PLAN OF CARE
Problem: Nutrition/Hydration-ADULT  Goal: Nutrient/Hydration intake appropriate for improving, restoring or maintaining nutritional needs  Description: Monitor and assess patient's nutrition/hydration status for malnutrition  Collaborate with interdisciplinary team and initiate plan and interventions as ordered  Monitor patient's weight and dietary intake as ordered or per policy  Utilize nutrition screening tool and intervene as necessary  Determine patient's food preferences and provide high-protein, high-caloric foods as appropriate       INTERVENTIONS:  - Monitor oral intake, urinary output, labs, and treatment plans  - Assess nutrition and hydration status and recommend course of action  - Evaluate amount of meals eaten  - Assist patient with eating if necessary   - Allow adequate time for meals  - Recommend/ encourage appropriate diets, oral nutritional supplements, and vitamin/mineral supplements  - Order, calculate, and assess calorie counts as needed  - Recommend, monitor, and adjust tube feedings and TPN/PPN based on assessed needs  - Assess need for intravenous fluids  - Provide specific nutrition/hydration education as appropriate  - Include patient/family/caregiver in decisions related to nutrition  Outcome: Progressing     Problem: PAIN - ADULT  Goal: Verbalizes/displays adequate comfort level or baseline comfort level  Description: Interventions:  - Encourage patient to monitor pain and request assistance  - Assess pain using appropriate pain scale  - Administer analgesics based on type and severity of pain and evaluate response  - Implement non-pharmacological measures as appropriate and evaluate response  - Consider cultural and social influences on pain and pain management  - Notify physician/advanced practitioner if interventions unsuccessful or patient reports new pain  Outcome: Progressing     Problem: SAFETY ADULT  Goal: Patient will remain free of falls  Description: INTERVENTIONS:  - Assess patient frequently for physical needs  -  Identify cognitive and physical deficits and behaviors that affect risk of falls    -  [place] fall precautions as indicated by assessment   - Educate patient/family on patient safety including physical limitations  - Instruct patient to call for assistance with activity based on assessment  - Modify environment to reduce risk of injury  - Consider OT/PT consult to assist with strengthening/mobility  Outcome: Progressing  Goal: Maintain or return to baseline ADL function  Description: INTERVENTIONS:  -  Assess patient's ability to carry out ADLs; assess patient's baseline for ADL function and identify physical deficits which impact ability to perform ADLs (bathing, care of mouth/teeth, toileting, grooming, dressing, etc )  - Assess/evaluate cause of self-care deficits   - Assess range of motion  - Assess patient's mobility; develop plan if impaired  - Assess patient's need for assistive devices and provide as appropriate  - Encourage maximum independence but intervene and supervise when necessary  - Involve family in performance of ADLs  - Assess for home care needs following discharge   - Consider OT consult to assist with ADL evaluation and planning for discharge  - Provide patient education as appropriate  Outcome: Progressing  Goal: Maintain or return mobility status to optimal level  Description: INTERVENTIONS:  - Assess patient's baseline mobility status (ambulation, transfers, stairs, etc )    - Identify cognitive and physical deficits and behaviors that affect mobility  - Identify mobility aids required to assist with transfers and/or ambulation (gait belt, sit-to-stand, lift, walker, cane, etc )  - [place] fall precautions as indicated by assessment  - Record patient progress and toleration of activity level on Mobility SBAR; progress patient to next Phase/Stage  - Instruct patient to call for assistance with activity based on assessment  - Consider rehabilitation consult to assist with strengthening/weightbearing, etc   Outcome: Progressing     Problem: Potential for Falls  Goal: Patient will remain free of falls  Description: INTERVENTIONS:  - Assess patient frequently for physical needs  -  Identify cognitive and physical deficits and behaviors that affect risk of falls    -  [place] fall precautions as indicated by assessment   - Educate patient/family on patient safety including physical limitations  - Instruct patient to call for assistance with activity based on assessment  - Modify environment to reduce risk of injury  - Consider OT/PT consult to assist with strengthening/mobility  Outcome: Progressing

## 2020-08-19 NOTE — NURSING NOTE
When rounding on patient, writer asked the patient how drinking the bowl prep was coming along  Patient stated "I am just so full, I am not drinking anymore " Explained to patient that he "may feel full momentarily, but that will soon be alleviated once the prep starts working " Patient shook his head and continued to refuse to drink  Asked the patient to drink as much as he can, even if it is just a little bit at a time  Patient continued to refuse  Will re-attempt at a later time to encourage drinking

## 2020-08-19 NOTE — UTILIZATION REVIEW
Continued Stay Review    Date:  8/18/20                   Current Patient Class: IP Current Level of Care: MS    HPI:86 y o  male initially admitted on 8/14 with severe, symptomatic anemia with Hgb 5 1 - transfused  Assessment/Plan:   Plan was for EGD and colonoscopy on 8/18 but pt could not complete good prep so he will be on cl liq diet and plan for testing to be done 8/19  HGB stable, BUN/Cr trending downward  melana is under control at this time       Pertinent Labs/Diagnostic Results:   Results from last 7 days   Lab Units 08/17/20  1404   SARS-COV-2  Negative     Results from last 7 days   Lab Units 08/18/20  1621 08/18/20  0439 08/17/20  1425 08/17/20  0435 08/16/20  0607   WBC Thousand/uL  --  5 90  --  6 45 5 71   HEMOGLOBIN g/dL 9 0* 7 9* 8 4* 7 7* 6 7*   HEMATOCRIT % 31 7* 26 6* 27 9* 25 3* 22 9*   PLATELETS Thousands/uL  --  201  --  196 202   NEUTROS ABS Thousands/µL  --  3 61  --   --  3 28         Results from last 7 days   Lab Units 08/18/20  0439 08/17/20  0435 08/16/20  0607 08/15/20  0444   SODIUM mmol/L 143 141 144 142   POTASSIUM mmol/L 4 6 4 3 4 4 4 6   CHLORIDE mmol/L 109* 110* 114* 113*   CO2 mmol/L 29 26 26 25   ANION GAP mmol/L 5 5 4 4   BUN mg/dL 34* 39* 42* 41*   CREATININE mg/dL 1 67* 1 69* 1 93* 1 89*   EGFR ml/min/1 73sq m 36 36 31 31   CALCIUM mg/dL 10 0 10 0 9 6 9 9   MAGNESIUM mg/dL  --   --   --  2 2   PHOSPHORUS mg/dL  --   --   --  3 1     Results from last 7 days   Lab Units 08/18/20  0439 08/15/20  0444 08/14/20  1104   AST U/L 23  --  35   ALT U/L 13  --  14   ALK PHOS U/L 200*  --  196*   TOTAL PROTEIN g/dL 7 1 7 0 7 8   ALBUMIN g/dL 3 1*  --  3 2*   TOTAL BILIRUBIN mg/dL 0 39  --  0 13*         Results from last 7 days   Lab Units 08/18/20  0439 08/17/20  0435 08/16/20  0607 08/15/20  0444 08/14/20  1104   GLUCOSE RANDOM mg/dL 78 87 80 80 167*     Results from last 7 days   Lab Units 08/14/20  1104   CK TOTAL U/L 73     Results from last 7 days   Lab Units 08/14/20  1104   TROPONIN I ng/mL <0 02     Results from last 7 days   Lab Units 08/18/20  0439 08/17/20  1425   PROTIME seconds 14 8* 14 5   INR  1 16 1 13   PTT seconds  --  24     Results from last 7 days   Lab Units 08/14/20  1104   TSH 3RD GENERATON uIU/mL 2 410     Results from last 7 days   Lab Units 08/14/20  1104   NT-PRO BNP pg/mL 1,915*     Results from last 7 days   Lab Units 08/15/20  0444   FERRITIN ng/mL 40     Vital Signs:   08/18/20 2251                     None (Room air)       08/18/20 14:46:46   97 5 °F (36 4 °C)   72   12   127/52   77   96 %          08/18/20 0713   98 2 °F (36 8 °C)   64   18   133/53   80   96 %          08/17/20 2239                     None (Room air)       08/17/20 22:26:19   99 2 °F (37 3 °C)   60   16   116/43Abnormal     67   93 %          08/17/20 15:21:19   98 3 °F (36 8 °C)   54Abnormal     18   116/42Abnormal     67   96 %   None (Room air)   Lying    08/17/20 06:25:56   99 °F (37 2 °C)   50Abnormal     20   119/51   74   95 %          08/17/20 03:15:10   98 7 °F (37 1 °C)   56   18   110/51   71   96 %            Medications:   Scheduled Medications:  amLODIPine, 5 mg, Oral, Daily  atorvastatin, 40 mg, Oral, Daily  bicalutamide, 50 mg, Oral, Daily  diltiazem, 120 mg, Oral, Daily  melatonin, 3 mg, Oral, HS  [START ON 8/20/2020] pantoprazole, 40 mg, Intravenous, Q24H RONALD  polyethylene glycol, 17 g, Oral, Daily      Continuous IV Infusions:     PRN Meds:  acetaminophen, 650 mg, Oral, Q6H PRN    Discharge Plan: home with [facility] AT [facility] Utilization Review Department  [email or web address]  ATTENTION: Please call with any questions or concerns to [phone] and carefully listen to the prompts so that you are directed to the right person   All voicemails are confidential   Harlene Pair all requests for admission clinical reviews, approved or denied determinations and any other requests to dedicated fax number below belonging to the campus where the patient is receiving treatment   List of dedicated fax numbers for the Facilities:  [address] DENIALS (Administrative/Medical Necessity) [phone]   [address] (Maternity/NICU/Pediatrics) [phone]   Marixa Cooney [phone]   [facility] [phone]   [facility] Natalie [phone]   Jimena Merida [phone]   Richard [address] [place] [address] [phone]   [facility]  [phone]   [address] [place] [address], [address]  [address] Formerly named [facility] [address] [facility] [phone]

## 2020-08-20 ENCOUNTER — TELEPHONE (OUTPATIENT)
Dept: GASTROENTEROLOGY | Facility: CLINIC | Age: 85
End: 2020-08-20

## 2020-08-20 NOTE — TELEPHONE ENCOUNTER
----- Message from Leelee Nelson DO sent at 8/19/2020  2:32 PM EDT -----  Regarding: Please schedule outpatient follow-up  Please schedule outpatient follow-up appointment in the fellow's clinic in 6-8 weeks for anemia and need for screening colonoscopy  Thank you      --  Leelee Nelson DO (PGY-4)  Gastroenterology Fellow  [address] [place] [address]

## 2020-09-18 ENCOUNTER — HOSPITAL ENCOUNTER (INPATIENT)
Facility: HOSPITAL | Age: 85
LOS: 5 days | Discharge: HOSPICE | DRG: 812 | End: 2020-09-23
Attending: EMERGENCY MEDICINE | Admitting: INTERNAL MEDICINE
Payer: MEDICARE

## 2020-09-18 ENCOUNTER — APPOINTMENT (EMERGENCY)
Dept: RADIOLOGY | Facility: HOSPITAL | Age: 85
DRG: 812 | End: 2020-09-18
Payer: MEDICARE

## 2020-09-18 DIAGNOSIS — I35.0 SEVERE AORTIC STENOSIS: ICD-10-CM

## 2020-09-18 DIAGNOSIS — N17.9 AKI (ACUTE KIDNEY INJURY) (HCC): ICD-10-CM

## 2020-09-18 DIAGNOSIS — R53.1 WEAKNESS: Primary | ICD-10-CM

## 2020-09-18 DIAGNOSIS — Z98.890 HISTORY OF AAA (ABDOMINAL AORTIC ANEURYSM) REPAIR: ICD-10-CM

## 2020-09-18 DIAGNOSIS — Z85.46 HISTORY OF PROSTATE CANCER: ICD-10-CM

## 2020-09-18 DIAGNOSIS — G89.3 CANCER-RELATED PAIN: Chronic | ICD-10-CM

## 2020-09-18 DIAGNOSIS — G89.3 CANCER ASSOCIATED PAIN: ICD-10-CM

## 2020-09-18 DIAGNOSIS — K92.2 GASTROINTESTINAL HEMORRHAGE, UNSPECIFIED GASTROINTESTINAL HEMORRHAGE TYPE: ICD-10-CM

## 2020-09-18 DIAGNOSIS — D64.9 SYMPTOMATIC ANEMIA: ICD-10-CM

## 2020-09-18 DIAGNOSIS — D64.9 ANEMIA: ICD-10-CM

## 2020-09-18 DIAGNOSIS — K92.1 MELENA: ICD-10-CM

## 2020-09-18 LAB
ABO GROUP BLD: NORMAL
ALBUMIN SERPL BCP-MCNC: 3.1 G/DL (ref 3.5–5)
ALP SERPL-CCNC: 276 U/L (ref 46–116)
ALT SERPL W P-5'-P-CCNC: 16 U/L (ref 12–78)
ANION GAP SERPL CALCULATED.3IONS-SCNC: 8 MMOL/L (ref 4–13)
AST SERPL W P-5'-P-CCNC: 52 U/L (ref 5–45)
BASOPHILS # BLD AUTO: 0 THOUSANDS/ΜL (ref 0–0.1)
BASOPHILS NFR BLD AUTO: 0 % (ref 0–1)
BILIRUB SERPL-MCNC: 0.26 MG/DL (ref 0.2–1)
BLD GP AB SCN SERPL QL: NEGATIVE
BUN SERPL-MCNC: 69 MG/DL (ref 5–25)
CA-I BLD-SCNC: 1.23 MMOL/L (ref 1.12–1.32)
CALCIUM ALBUM COR SERPL-MCNC: 10.7 MG/DL (ref 8.3–10.1)
CALCIUM SERPL-MCNC: 10 MG/DL (ref 8.3–10.1)
CHLORIDE SERPL-SCNC: 103 MMOL/L (ref 100–108)
CO2 SERPL-SCNC: 28 MMOL/L (ref 21–32)
CREAT SERPL-MCNC: 2.87 MG/DL (ref 0.6–1.3)
EOSINOPHIL # BLD AUTO: 0.03 THOUSAND/ΜL (ref 0–0.61)
EOSINOPHIL NFR BLD AUTO: 0 % (ref 0–6)
ERYTHROCYTE [DISTWIDTH] IN BLOOD BY AUTOMATED COUNT: 21.8 % (ref 11.6–15.1)
GFR SERPL CREATININE-BSD FRML MDRD: 19 ML/MIN/1.73SQ M
GLUCOSE SERPL-MCNC: 208 MG/DL (ref 65–140)
HCT VFR BLD AUTO: 20.6 % (ref 36.5–49.3)
HGB BLD-MCNC: 5.7 G/DL (ref 12–17)
IMM GRANULOCYTES # BLD AUTO: 0.06 THOUSAND/UL (ref 0–0.2)
IMM GRANULOCYTES NFR BLD AUTO: 1 % (ref 0–2)
LYMPHOCYTES # BLD AUTO: 0.93 THOUSANDS/ΜL (ref 0.6–4.47)
LYMPHOCYTES NFR BLD AUTO: 12 % (ref 14–44)
MCH RBC QN AUTO: 21.3 PG (ref 26.8–34.3)
MCHC RBC AUTO-ENTMCNC: 27.7 G/DL (ref 31.4–37.4)
MCV RBC AUTO: 77 FL (ref 82–98)
MONOCYTES # BLD AUTO: 0.6 THOUSAND/ΜL (ref 0.17–1.22)
MONOCYTES NFR BLD AUTO: 8 % (ref 4–12)
NEUTROPHILS # BLD AUTO: 5.98 THOUSANDS/ΜL (ref 1.85–7.62)
NEUTS SEG NFR BLD AUTO: 79 % (ref 43–75)
NRBC BLD AUTO-RTO: 1 /100 WBCS
PLATELET # BLD AUTO: 273 THOUSANDS/UL (ref 149–390)
PMV BLD AUTO: 9.6 FL (ref 8.9–12.7)
POTASSIUM SERPL-SCNC: 5 MMOL/L (ref 3.5–5.3)
PROT SERPL-MCNC: 8.3 G/DL (ref 6.4–8.2)
RBC # BLD AUTO: 2.67 MILLION/UL (ref 3.88–5.62)
RH BLD: POSITIVE
SODIUM SERPL-SCNC: 139 MMOL/L (ref 136–145)
SPECIMEN EXPIRATION DATE: NORMAL
TROPONIN I SERPL-MCNC: 0.05 NG/ML
TROPONIN I SERPL-MCNC: 0.05 NG/ML
WBC # BLD AUTO: 7.6 THOUSAND/UL (ref 4.31–10.16)

## 2020-09-18 PROCEDURE — G1004 CDSM NDSC: HCPCS

## 2020-09-18 PROCEDURE — 71250 CT THORAX DX C-: CPT

## 2020-09-18 PROCEDURE — 86850 RBC ANTIBODY SCREEN: CPT | Performed by: EMERGENCY MEDICINE

## 2020-09-18 PROCEDURE — 99291 CRITICAL CARE FIRST HOUR: CPT | Performed by: EMERGENCY MEDICINE

## 2020-09-18 PROCEDURE — 36430 TRANSFUSION BLD/BLD COMPNT: CPT

## 2020-09-18 PROCEDURE — P9016 RBC LEUKOCYTES REDUCED: HCPCS

## 2020-09-18 PROCEDURE — 93005 ELECTROCARDIOGRAM TRACING: CPT

## 2020-09-18 PROCEDURE — 36415 COLL VENOUS BLD VENIPUNCTURE: CPT | Performed by: EMERGENCY MEDICINE

## 2020-09-18 PROCEDURE — 80053 COMPREHEN METABOLIC PANEL: CPT | Performed by: EMERGENCY MEDICINE

## 2020-09-18 PROCEDURE — 86900 BLOOD TYPING SEROLOGIC ABO: CPT | Performed by: EMERGENCY MEDICINE

## 2020-09-18 PROCEDURE — 30233N1 TRANSFUSION OF NONAUTOLOGOUS RED BLOOD CELLS INTO PERIPHERAL VEIN, PERCUTANEOUS APPROACH: ICD-10-PCS | Performed by: INTERNAL MEDICINE

## 2020-09-18 PROCEDURE — 86923 COMPATIBILITY TEST ELECTRIC: CPT

## 2020-09-18 PROCEDURE — 85025 COMPLETE CBC W/AUTO DIFF WBC: CPT | Performed by: EMERGENCY MEDICINE

## 2020-09-18 PROCEDURE — 84484 ASSAY OF TROPONIN QUANT: CPT | Performed by: EMERGENCY MEDICINE

## 2020-09-18 PROCEDURE — 86901 BLOOD TYPING SEROLOGIC RH(D): CPT | Performed by: EMERGENCY MEDICINE

## 2020-09-18 PROCEDURE — 84484 ASSAY OF TROPONIN QUANT: CPT | Performed by: INTERNAL MEDICINE

## 2020-09-18 PROCEDURE — 99285 EMERGENCY DEPT VISIT HI MDM: CPT

## 2020-09-18 PROCEDURE — 96374 THER/PROPH/DIAG INJ IV PUSH: CPT

## 2020-09-18 PROCEDURE — 85018 HEMOGLOBIN: CPT | Performed by: INTERNAL MEDICINE

## 2020-09-18 PROCEDURE — 82330 ASSAY OF CALCIUM: CPT | Performed by: EMERGENCY MEDICINE

## 2020-09-18 PROCEDURE — 74176 CT ABD & PELVIS W/O CONTRAST: CPT

## 2020-09-18 RX ORDER — ONDANSETRON 2 MG/ML
4 INJECTION INTRAMUSCULAR; INTRAVENOUS EVERY 4 HOURS PRN
Status: DISCONTINUED | OUTPATIENT
Start: 2020-09-18 | End: 2020-09-23 | Stop reason: HOSPADM

## 2020-09-18 RX ORDER — BICALUTAMIDE 50 MG/1
50 TABLET, FILM COATED ORAL DAILY
Status: DISCONTINUED | OUTPATIENT
Start: 2020-09-19 | End: 2020-09-23 | Stop reason: HOSPADM

## 2020-09-18 RX ORDER — HYDROMORPHONE HCL/PF 1 MG/ML
0.5 SYRINGE (ML) INJECTION ONCE
Status: COMPLETED | OUTPATIENT
Start: 2020-09-18 | End: 2020-09-18

## 2020-09-18 RX ORDER — OXYCODONE HYDROCHLORIDE 5 MG/1
5 TABLET ORAL EVERY 4 HOURS PRN
Status: DISCONTINUED | OUTPATIENT
Start: 2020-09-18 | End: 2020-09-20

## 2020-09-18 RX ORDER — ACETAMINOPHEN 325 MG/1
650 TABLET ORAL EVERY 6 HOURS PRN
Status: DISCONTINUED | OUTPATIENT
Start: 2020-09-18 | End: 2020-09-18

## 2020-09-18 RX ORDER — LIDOCAINE 50 MG/G
1 PATCH TOPICAL DAILY
Status: COMPLETED | OUTPATIENT
Start: 2020-09-19 | End: 2020-09-19

## 2020-09-18 RX ORDER — AMOXICILLIN 250 MG
1 CAPSULE ORAL 2 TIMES DAILY
Status: DISCONTINUED | OUTPATIENT
Start: 2020-09-18 | End: 2020-09-23 | Stop reason: HOSPADM

## 2020-09-18 RX ORDER — OXYCODONE HYDROCHLORIDE 5 MG/1
2.5 TABLET ORAL EVERY 4 HOURS PRN
Status: DISCONTINUED | OUTPATIENT
Start: 2020-09-18 | End: 2020-09-20

## 2020-09-18 RX ORDER — ACETAMINOPHEN 325 MG/1
975 TABLET ORAL EVERY 8 HOURS SCHEDULED
Status: DISCONTINUED | OUTPATIENT
Start: 2020-09-18 | End: 2020-09-23 | Stop reason: HOSPADM

## 2020-09-18 RX ORDER — CALCITRIOL 0.25 UG/1
0.25 CAPSULE, LIQUID FILLED ORAL DAILY
Status: DISCONTINUED | OUTPATIENT
Start: 2020-09-19 | End: 2020-09-19

## 2020-09-18 RX ORDER — ATORVASTATIN CALCIUM 40 MG/1
40 TABLET, FILM COATED ORAL DAILY
Status: DISCONTINUED | OUTPATIENT
Start: 2020-09-19 | End: 2020-09-23 | Stop reason: HOSPADM

## 2020-09-18 RX ORDER — SODIUM CHLORIDE 9 MG/ML
75 INJECTION, SOLUTION INTRAVENOUS CONTINUOUS
Status: DISCONTINUED | OUTPATIENT
Start: 2020-09-18 | End: 2020-09-19

## 2020-09-18 RX ADMIN — ACETAMINOPHEN 975 MG: 325 TABLET, FILM COATED ORAL at 22:13

## 2020-09-18 RX ADMIN — HYDROMORPHONE HYDROCHLORIDE 0.5 MG: 1 INJECTION, SOLUTION INTRAMUSCULAR; INTRAVENOUS; SUBCUTANEOUS at 17:03

## 2020-09-18 RX ADMIN — SODIUM CHLORIDE 75 ML/HR: 0.9 INJECTION, SOLUTION INTRAVENOUS at 22:19

## 2020-09-18 RX ADMIN — DOCUSATE SODIUM AND SENNOSIDES 1 TABLET: 8.6; 5 TABLET ORAL at 22:13

## 2020-09-18 NOTE — ED PROVIDER NOTES
History  Chief Complaint   Patient presents with    Weakness - Generalized     pt presents by hospital wheelchair with c/o lower back pain for "a long time, can't take the pain anymore " nephrostomy tube present  pt reports pain all over and generalized weakness  hx cancer      Saeid Lockhart is an 80 y o  man with a PMH significant for metastatic prostate cancer, nephrostomy tube placement, AAA status post repair complicated by endoleak, recently hospitalized in July 2020 for anemia (Hb 5 1 on presentation) with undetermined cause/source, presenting with generalized weakness and worsening pain over the last two weeks  Over the last 2 days, this weakness and pain has increased even more  His weakness is non-focal  His pain is generalized, mostly in his ribs and back  He does not recall where his cancer metastasis are  He also complains of some chest pain, which begins in his left chest wall and radiates to his right chest  He has difficulty describing it further  He has no history of cardiac disease  On review of system, he reports some melanotic stool movements over the last 2 weeks  No jam blood  No nausea, vomiting, abdominal pain, diarrhea, fevers, chills  Chart review shows he was hospitalized in July for hemoglobin 5 1, had an endoscopy and colonoscopy performed during the hospitalization  Endoscopy showed a sliding hiatal hernia and no signs of bleeding  The colonoscopy had poor preparation and did not show any signs of bleeding  The gastroenterologist performing colonoscopy recommended a repeat colonoscopy for further evaluation  This was not performed  It was suspected that his drop in hemoglobin may be related to the endovascular repair of his AAA, however no CT scan with contrast was performed during this hospitalization  He was given a total of 3 units of pRBCs in this hospitalization      He now lives in the [facility] and does not have an oncologist here, nor does he follow with palliative care  Prior to Admission Medications   Prescriptions Last Dose Informant Patient Reported? Taking? amLODIPine (NORVASC) 5 mg tablet  Family Member Yes No   Sig: Take 5 mg by mouth daily   atorvastatin (LIPITOR) 40 mg tablet  Family Member Yes No   Sig: Take 40 mg by mouth daily   b complex-C-folic acid (NEPHRO-YONIS) 0 8 mg tablet  Family Member Yes No   Sig: Take 0 8 mg by mouth daily with dinner   bicalutamide (CASODEX) 50 mg tablet  Family Member Yes No   Sig: Take 50 mg by mouth daily   calcitriol (ROCALTROL) 0 25 mcg capsule  Family Member Yes No   Sig: Take 0 25 mcg by mouth daily   diltiazem (dilTIAZem CD) 120 mg 24 hr capsule  Family Member Yes No   Sig: Take 120 mg by mouth daily      Facility-Administered Medications: None       Past Medical History:   Diagnosis Date    AAA (abdominal aortic aneurysm) (HCC)     s/p repair, now w/ aneurysm distal to repair    CKD (chronic kidney disease)     unknown baseline    Hyperlipidemia     Hypertension     Prostate CA ([address] )     s/p nephrostomy tube, w/ bone mets    Pulmonary nodule     23mm RUL    Severe aortic stenosis        History reviewed  No pertinent surgical history  History reviewed  No pertinent family history  I have reviewed and agree with the history as documented  E-Cigarette/Vaping    E-Cigarette Use Never User      E-Cigarette/Vaping Substances     Social History     Tobacco Use    Smoking status: Former Smoker    Smokeless tobacco: Never Used   Substance Use Topics    Alcohol use: Never     Frequency: Never    Drug use: Never        Review of Systems   Constitutional: Positive for fatigue  Negative for chills and fever  Respiratory: Negative for shortness of breath  Gastrointestinal: Positive for abdominal distention  Negative for abdominal pain, blood in stool, diarrhea, nausea and vomiting  Genitourinary: Positive for flank pain   Negative for decreased urine volume, difficulty urinating and hematuria  Musculoskeletal: Positive for back pain  Neurological: Positive for weakness  Negative for numbness and headaches  All other systems reviewed and are negative  Physical Exam  ED Triage Vitals [09/18/20 1548]   Temperature Pulse Respirations Blood Pressure SpO2   (!) 94 7 °F (34 8 °C) 73 16 132/60 100 %      Temp Source Heart Rate Source Patient Position - Orthostatic VS BP Location FiO2 (%)   (S) Tympanic Monitor -- -- --      Pain Score       Worst Possible Pain             Orthostatic Vital Signs  Vitals:    09/18/20 1830 09/18/20 1915 09/18/20 2051 09/18/20 2135   BP: 116/57 113/56 125/60 (!) 126/47   Pulse: 64 62 64 63       Physical Exam  Vitals signs reviewed  Constitutional:       General: He is not in acute distress  Appearance: Normal appearance  He is ill-appearing  He is not toxic-appearing  Comments: Patient is very cachetic  HENT:      Head: Normocephalic and atraumatic  Comments: Pale conjunctiva  Eyes:      Extraocular Movements: Extraocular movements intact  Conjunctiva/sclera: Conjunctivae normal       Pupils: Pupils are equal, round, and reactive to light  Cardiovascular:      Rate and Rhythm: Normal rate and regular rhythm  Pulses: Normal pulses  Radial pulses are 2+ on the right side and 2+ on the left side  Dorsalis pedis pulses are 2+ on the right side and 2+ on the left side  Heart sounds: Murmur present  Pulmonary:      Effort: Pulmonary effort is normal  No respiratory distress  Breath sounds: Normal breath sounds  No wheezing  Chest:      Chest wall: Tenderness present  No deformity  Abdominal:      General: Abdomen is flat  There is no distension  Palpations: Abdomen is soft  Tenderness: There is no guarding  Comments: Diffuse abdominal tenderness  Tenderness is most severe of the patient's spine and ribcage  Genitourinary:     Comments: Urine output nephrostomy to appears clear    No blood or jam pus present  Musculoskeletal:      Right lower leg: No edema  Left lower leg: No edema  Comments: No tenderness over his extremities  Skin:     General: Skin is warm and dry  Capillary Refill: Capillary refill takes less than 2 seconds  Coloration: Skin is pale  Skin is not jaundiced  Neurological:      General: No focal deficit present  Mental Status: He is alert and oriented to person, place, and time  Cranial Nerves: No cranial nerve deficit  Sensory: No sensory deficit  Motor: No weakness           ED Medications  Medications   atorvastatin (LIPITOR) tablet 40 mg (has no administration in time range)   calcitriol (ROCALTROL) capsule 0 25 mcg (has no administration in time range)   bicalutamide (CASODEX) tablet 50 mg (has no administration in time range)   pantoprazole (PROTONIX) 80 mg in sodium chloride 0 9 % 100 mL IVPB (has no administration in time range)     And   pantoprazole (PROTONIX) 80 mg in sodium chloride 0 9 % 100 mL infusion (has no administration in time range)   sodium chloride 0 9 % infusion (75 mL/hr Intravenous New Bag 9/18/20 2219)   acetaminophen (TYLENOL) tablet 975 mg (975 mg Oral Given 9/18/20 2213)   lidocaine (LIDODERM) 5 % patch 1 patch (has no administration in time range)   oxyCODONE (ROXICODONE) IR tablet 2 5 mg (has no administration in time range)   oxyCODONE (ROXICODONE) IR tablet 5 mg (has no administration in time range)   senna-docusate sodium (SENOKOT S) 8 6-50 mg per tablet 1 tablet (1 tablet Oral Given 9/18/20 2213)   ondansetron (ZOFRAN) injection 4 mg (has no administration in time range)   HYDROmorphone (DILAUDID) injection 0 5 mg (0 5 mg Intravenous Given 9/18/20 1703)       Diagnostic Studies  Results Reviewed     Procedure Component Value Units Date/Time    Troponin I [[id number]]  (Abnormal) Collected:  09/18/20 1940    Lab Status:  Final result Specimen:  Blood from Arm, Left Updated:  09/18/20 2044 Troponin I 0 05 ng/mL     CBC and differential [[id number]]  (Abnormal) Collected:  09/18/20 1640    Lab Status:  Final result Specimen:  Blood from Arm, Left Updated:  09/18/20 1719     WBC 7 60 Thousand/uL      RBC 2 67 Million/uL      Hemoglobin 5 7 g/dL      Hematocrit 20 6 %      MCV 77 fL      MCH 21 3 pg      MCHC 27 7 g/dL      RDW 21 8 %      MPV 9 6 fL      Platelets 498 Thousands/uL      nRBC 1 /100 WBCs      Neutrophils Relative 79 %      Immat GRANS % 1 %      Lymphocytes Relative 12 %      Monocytes Relative 8 %      Eosinophils Relative 0 %      Basophils Relative 0 %      Neutrophils Absolute 5 98 Thousands/µL      Immature Grans Absolute 0 06 Thousand/uL      Lymphocytes Absolute 0 93 Thousands/µL      Monocytes Absolute 0 60 Thousand/µL      Eosinophils Absolute 0 03 Thousand/µL      Basophils Absolute 0 00 Thousands/µL     Comprehensive metabolic panel [[id number]]  (Abnormal) Collected:  09/18/20 1640    Lab Status:  Final result Specimen:  Blood from Arm, Left Updated:  09/18/20 1708     Sodium 139 mmol/L      Potassium 5 0 mmol/L      Chloride 103 mmol/L      CO2 28 mmol/L      ANION GAP 8 mmol/L      BUN 69 mg/dL      Creatinine 2 87 mg/dL      Glucose 208 mg/dL      Calcium 10 0 mg/dL      Corrected Calcium 10 7 mg/dL      AST 52 U/L      ALT 16 U/L      Alkaline Phosphatase 276 U/L      Total Protein 8 3 g/dL      Albumin 3 1 g/dL      Total Bilirubin 0 26 mg/dL      eGFR 19 ml/min/1 73sq m     Narrative:       [place] guidelines for Chronic Kidney Disease (CKD):     Stage 1 with normal or high GFR (GFR > 90 mL/min/1 73 square meters)    Stage 2 Mild CKD (GFR = 60-89 mL/min/1 73 square meters)    Stage 3A Moderate CKD (GFR = 45-59 mL/min/1 73 square meters)    Stage 3B Moderate CKD (GFR = 30-44 mL/min/1 73 square meters)    Stage 4 Severe CKD (GFR = 15-29 mL/min/1 73 square meters)    Stage 5 End Stage CKD (GFR <15 mL/min/1 73 square meters)  Note: GFR calculation is accurate only with a steady state creatinine    Troponin I [[id number]]  (Abnormal) Collected:  09/18/20 1640    Lab Status:  Final result Specimen:  Blood from Arm, Left Updated:  09/18/20 1708     Troponin I 0 05 ng/mL     Calcium, ionized [[id number]]  (Normal) Collected:  09/18/20 1640    Lab Status:  Final result Specimen:  Blood from Arm, Left Updated:  09/18/20 1648     Calcium, Ionized 1 23 mmol/L                  CT chest abdomen pelvis wo contrast   Final Result by Verito Palmer MD (09/18 2010)   1  Widespread osseous metastases again visualized  2   Stable large abdominal aortic aneurysm measuring 9 7 x 10 2 cm with stent graft  3   Stable prostatomegaly protruding into the bladder base  4   Stable right lung ground glass densities  Follow-up CT as previously recommended  5                         Workstation performed: [id number]               Procedures  ECG 12 Lead Documentation Only    Date/Time: 9/18/2020 6:04 PM  Performed by: Alesia Pressley MD  Authorized by: Alesia Pressley MD     Indications / Diagnosis:  Chest pain  ECG reviewed by me, the ED Provider: yes    Patient location:  ED  Previous ECG:     Previous ECG:  Compared to current    Similarity:  Changes noted    Comparison to cardiac monitor: No    Interpretation:     Interpretation: normal    Rate:     ECG rate:  68    ECG rate assessment: normal    Rhythm:     Rhythm: sinus rhythm    Ectopy:     Ectopy: none    QRS:     QRS axis:  Normal  Conduction:     Conduction: normal    ST segments:     ST segments:  Normal  T waves:     T waves: normal            ED Course  ED Course as of Sep 18 2235   Fri Sep 18, 2020   1842 Hemoglobin(!!): 5 7   1842 Troponin I(!): 0 05                                       MDM  Number of Diagnoses or Management Options  Anemia:   Cancer associated pain:   Weakness:   Diagnosis management comments: 80 y o  man presenting with generalized weakness and pain   Differential includes but is not limited to: endovascular leak related to AAA, GI bleed, anemia due to chronic kidney disease, STEMI/NSTEMI  Will assess with CBC, CMP, troponin, ECG, type and screen, ionized calcium  CBC show hemoglobin of 5 7  CMP shows YULIET  Will transfuse 2 units pRBCs  ECG is unremarkable  Initial and repeat troponin 0 05  Alk phos elevation likely due to bone metastasis  CT scan show stable sized AAA, multiple metastasis, stable prostatomegaly, and stable ground glass opacities  Ionized calcium WNL  Patient admitted to [facility] for further evaluation of anemia  Disposition  Final diagnoses:   Weakness   Cancer associated pain   Anemia     Time reflects when diagnosis was documented in both MDM as applicable and the Disposition within this note     Time User Action Codes Description Comment    9/18/2020  8:21 PM Lajune Camryn Add [R53 1] Weakness     9/18/2020  8:22 PM Lajune Camryn Add [G89 3] Cancer associated pain     9/18/2020  8:22 PM Lenord Jewels [D64 9] Anemia     9/18/2020  9:36 PM Surinder Rhoda Add [K92 2] Gastrointestinal hemorrhage, unspecified gastrointestinal hemorrhage type     9/18/2020 10:26 PM [place] Rhoda Add [N95 206] History of AAA (abdominal aortic aneurysm) repair     9/18/2020 10:29 PM [place] Rhoda Add [N17 9] YULIET (acute kidney injury) ([facility] [address] )     9/18/2020 10:29 PM Surinder Rhoda Remove [N17 9] YULIET (acute kidney injury) ([facility] [address] )     9/18/2020 10:29 PM Surinder Rhoda Add [N17 9] YULIET (acute kidney injury) [facility])       ED Disposition     ED Disposition Condition Date/Time Comment    Admit Stable Fri Sep 18, 2020  8:20 PM Case was discussed with SOD and the patient's admission status was agreed to be Admission Status: inpatient status to the service of Dr Ulysses Kemp           Follow-up Information    None         Current Discharge Medication List      CONTINUE these medications which have NOT CHANGED    Details   amLODIPine (NORVASC) 5 mg tablet Take 5 mg by mouth daily      atorvastatin (LIPITOR) 40 mg tablet Take 40 mg by mouth daily      b complex-C-folic acid (NEPHRO-YONIS) 0 8 mg tablet Take 0 8 mg by mouth daily with dinner      bicalutamide (CASODEX) 50 mg tablet Take 50 mg by mouth daily      calcitriol (ROCALTROL) 0 25 mcg capsule Take 0 25 mcg by mouth daily      diltiazem (dilTIAZem CD) 120 mg 24 hr capsule Take 120 mg by mouth daily           No discharge procedures on file  PDMP Review     None           ED Provider  Attending physically available and evaluated Aaron Shields I managed the patient along with the ED Attending      Electronically Signed by         Maria Luz Mendez MD  09/18/20 7341

## 2020-09-18 NOTE — ED PROCEDURE NOTE
Procedure  POC AAA US    Date/Time: 9/18/2020 5:31 PM  Performed by: Saman Guerra DO  Authorized by: Saman Guerra DO     Patient location:  ED  Performing Provider:  Resident  Other Assisting Provider: Yes (comment) (Dr Justyna Esteban)    Procedure details:     Exam Type:  Educational    Indications: abdominal pain      Views Obtained:  Transverse proximal, transverse mid view, transverse distal view and sagittal (longitudinal) view    Image quality: diagnostic      Image availability:  Images available in PACS, still images obtained and video obtained  Findings:     Abdominal Aorta Findings: abnormal      Aneurysm (if present): aortic aneurysm >8 cm      Intra-abdominal fluid: not identified    Interpretation:     Aortic ultrasound impression: aneurysm present    Comments:      Known aneurysm >8cm visualized  Endograft in place   No sign of endograft leak                     Saman Guerra DO  09/18/20 [address] Karla Quintero MD  09/19/20 [id number]

## 2020-09-19 PROBLEM — R77.8 ELEVATED TROPONIN: Status: ACTIVE | Noted: 2020-09-19

## 2020-09-19 PROBLEM — M54.9 BACK PAIN: Status: ACTIVE | Noted: 2020-09-19

## 2020-09-19 LAB
ABO GROUP BLD BPU: NORMAL
ANION GAP SERPL CALCULATED.3IONS-SCNC: 7 MMOL/L (ref 4–13)
ATRIAL RATE: 62 BPM
ATRIAL RATE: 65 BPM
ATRIAL RATE: 82 BPM
BASOPHILS # BLD AUTO: 0.02 THOUSANDS/ΜL (ref 0–0.1)
BASOPHILS NFR BLD AUTO: 0 % (ref 0–1)
BPU ID: NORMAL
BUN SERPL-MCNC: 60 MG/DL (ref 5–25)
CALCIUM SERPL-MCNC: 9.6 MG/DL (ref 8.3–10.1)
CHLORIDE SERPL-SCNC: 112 MMOL/L (ref 100–108)
CO2 SERPL-SCNC: 25 MMOL/L (ref 21–32)
CREAT SERPL-MCNC: 2.08 MG/DL (ref 0.6–1.3)
CROSSMATCH: NORMAL
EOSINOPHIL # BLD AUTO: 0.28 THOUSAND/ΜL (ref 0–0.61)
EOSINOPHIL NFR BLD AUTO: 4 % (ref 0–6)
ERYTHROCYTE [DISTWIDTH] IN BLOOD BY AUTOMATED COUNT: 19.6 % (ref 11.6–15.1)
FERRITIN SERPL-MCNC: 78 NG/ML (ref 8–388)
GFR SERPL CREATININE-BSD FRML MDRD: 28 ML/MIN/1.73SQ M
GLUCOSE SERPL-MCNC: 107 MG/DL (ref 65–140)
HCT VFR BLD AUTO: 26.2 % (ref 36.5–49.3)
HCT VFR BLD AUTO: 26.4 % (ref 36.5–49.3)
HGB BLD-MCNC: 6.7 G/DL (ref 12–17)
HGB BLD-MCNC: 8.2 G/DL (ref 12–17)
HGB BLD-MCNC: 8.3 G/DL (ref 12–17)
HGB BLD-MCNC: 8.5 G/DL (ref 12–17)
HGB BLD-MCNC: 9 G/DL (ref 12–17)
IMM GRANULOCYTES # BLD AUTO: 0.1 THOUSAND/UL (ref 0–0.2)
IMM GRANULOCYTES NFR BLD AUTO: 1 % (ref 0–2)
IRON SATN MFR SERPL: 33 %
IRON SERPL-MCNC: 68 UG/DL (ref 65–175)
LYMPHOCYTES # BLD AUTO: 0.88 THOUSANDS/ΜL (ref 0.6–4.47)
LYMPHOCYTES NFR BLD AUTO: 12 % (ref 14–44)
MCH RBC QN AUTO: 25 PG (ref 26.8–34.3)
MCHC RBC AUTO-ENTMCNC: 31.4 G/DL (ref 31.4–37.4)
MCV RBC AUTO: 80 FL (ref 82–98)
MONOCYTES # BLD AUTO: 0.62 THOUSAND/ΜL (ref 0.17–1.22)
MONOCYTES NFR BLD AUTO: 8 % (ref 4–12)
NEUTROPHILS # BLD AUTO: 5.64 THOUSANDS/ΜL (ref 1.85–7.62)
NEUTS SEG NFR BLD AUTO: 75 % (ref 43–75)
NRBC BLD AUTO-RTO: 1 /100 WBCS
P AXIS: 68 DEGREES
P AXIS: 69 DEGREES
P AXIS: 90 DEGREES
PLATELET # BLD AUTO: 196 THOUSANDS/UL (ref 149–390)
PMV BLD AUTO: 9.2 FL (ref 8.9–12.7)
POTASSIUM SERPL-SCNC: 4.5 MMOL/L (ref 3.5–5.3)
PR INTERVAL: 106 MS
PR INTERVAL: 166 MS
PR INTERVAL: 168 MS
QRS AXIS: -4 DEGREES
QRS AXIS: 17 DEGREES
QRS AXIS: 22 DEGREES
QRSD INTERVAL: 100 MS
QRSD INTERVAL: 94 MS
QRSD INTERVAL: 96 MS
QT INTERVAL: 380 MS
QT INTERVAL: 398 MS
QT INTERVAL: 402 MS
QTC INTERVAL: 404 MS
QTC INTERVAL: 408 MS
QTC INTERVAL: 413 MS
RBC # BLD AUTO: 3.32 MILLION/UL (ref 3.88–5.62)
SODIUM SERPL-SCNC: 144 MMOL/L (ref 136–145)
T WAVE AXIS: 55 DEGREES
T WAVE AXIS: 65 DEGREES
T WAVE AXIS: 81 DEGREES
TIBC SERPL-MCNC: 207 UG/DL (ref 250–450)
TROPONIN I SERPL-MCNC: 0.05 NG/ML
TROPONIN I SERPL-MCNC: 0.06 NG/ML
UNIT DISPENSE STATUS: NORMAL
UNIT PRODUCT CODE: NORMAL
UNIT RH: NORMAL
VENTRICULAR RATE: 62 BPM
VENTRICULAR RATE: 65 BPM
VENTRICULAR RATE: 68 BPM
WBC # BLD AUTO: 7.54 THOUSAND/UL (ref 4.31–10.16)

## 2020-09-19 PROCEDURE — 85025 COMPLETE CBC W/AUTO DIFF WBC: CPT | Performed by: INTERNAL MEDICINE

## 2020-09-19 PROCEDURE — 82728 ASSAY OF FERRITIN: CPT | Performed by: INTERNAL MEDICINE

## 2020-09-19 PROCEDURE — 0DJD8ZZ INSPECTION OF LOWER INTESTINAL TRACT, VIA NATURAL OR ARTIFICIAL OPENING ENDOSCOPIC: ICD-10-PCS | Performed by: INTERNAL MEDICINE

## 2020-09-19 PROCEDURE — P9016 RBC LEUKOCYTES REDUCED: HCPCS

## 2020-09-19 PROCEDURE — 99232 SBSQ HOSP IP/OBS MODERATE 35: CPT | Performed by: SURGERY

## 2020-09-19 PROCEDURE — 30233N1 TRANSFUSION OF NONAUTOLOGOUS RED BLOOD CELLS INTO PERIPHERAL VEIN, PERCUTANEOUS APPROACH: ICD-10-PCS | Performed by: INTERNAL MEDICINE

## 2020-09-19 PROCEDURE — 99223 1ST HOSP IP/OBS HIGH 75: CPT | Performed by: INTERNAL MEDICINE

## 2020-09-19 PROCEDURE — 83550 IRON BINDING TEST: CPT | Performed by: INTERNAL MEDICINE

## 2020-09-19 PROCEDURE — 85014 HEMATOCRIT: CPT | Performed by: INTERNAL MEDICINE

## 2020-09-19 PROCEDURE — 84484 ASSAY OF TROPONIN QUANT: CPT | Performed by: INTERNAL MEDICINE

## 2020-09-19 PROCEDURE — 93010 ELECTROCARDIOGRAM REPORT: CPT | Performed by: INTERNAL MEDICINE

## 2020-09-19 PROCEDURE — C9113 INJ PANTOPRAZOLE SODIUM, VIA: HCPCS | Performed by: INTERNAL MEDICINE

## 2020-09-19 PROCEDURE — 80048 BASIC METABOLIC PNL TOTAL CA: CPT | Performed by: INTERNAL MEDICINE

## 2020-09-19 PROCEDURE — 85018 HEMOGLOBIN: CPT | Performed by: INTERNAL MEDICINE

## 2020-09-19 PROCEDURE — 83540 ASSAY OF IRON: CPT | Performed by: INTERNAL MEDICINE

## 2020-09-19 RX ORDER — SODIUM CHLORIDE 9 MG/ML
75 INJECTION, SOLUTION INTRAVENOUS CONTINUOUS
Status: DISCONTINUED | OUTPATIENT
Start: 2020-09-19 | End: 2020-09-19

## 2020-09-19 RX ORDER — DEXTROSE AND SODIUM CHLORIDE 5; .2 G/100ML; G/100ML
75 INJECTION, SOLUTION INTRAVENOUS CONTINUOUS
Status: DISCONTINUED | OUTPATIENT
Start: 2020-09-19 | End: 2020-09-20

## 2020-09-19 RX ADMIN — SODIUM CHLORIDE 8 MG/HR: 9 INJECTION, SOLUTION INTRAVENOUS at 22:00

## 2020-09-19 RX ADMIN — BISACODYL 20 MG: 5 TABLET, COATED ORAL at 17:39

## 2020-09-19 RX ADMIN — DOCUSATE SODIUM AND SENNOSIDES 1 TABLET: 8.6; 5 TABLET ORAL at 09:26

## 2020-09-19 RX ADMIN — SODIUM CHLORIDE 75 ML/HR: 0.9 INJECTION, SOLUTION INTRAVENOUS at 01:16

## 2020-09-19 RX ADMIN — OXYCODONE HYDROCHLORIDE 5 MG: 5 TABLET ORAL at 09:33

## 2020-09-19 RX ADMIN — ATORVASTATIN CALCIUM 40 MG: 40 TABLET, FILM COATED ORAL at 09:26

## 2020-09-19 RX ADMIN — SODIUM CHLORIDE 8 MG/HR: 9 INJECTION, SOLUTION INTRAVENOUS at 11:28

## 2020-09-19 RX ADMIN — OXYCODONE HYDROCHLORIDE 5 MG: 5 TABLET ORAL at 14:48

## 2020-09-19 RX ADMIN — DEXTROSE AND SODIUM CHLORIDE 75 ML/HR: 5; .2 INJECTION, SOLUTION INTRAVENOUS at 13:31

## 2020-09-19 RX ADMIN — DOCUSATE SODIUM AND SENNOSIDES 1 TABLET: 8.6; 5 TABLET ORAL at 17:39

## 2020-09-19 RX ADMIN — SODIUM CHLORIDE 80 MG: 9 INJECTION, SOLUTION INTRAVENOUS at 01:08

## 2020-09-19 RX ADMIN — BICALUTAMIDE 50 MG: 50 TABLET, FILM COATED ORAL at 09:28

## 2020-09-19 RX ADMIN — ACETAMINOPHEN 975 MG: 325 TABLET, FILM COATED ORAL at 21:36

## 2020-09-19 RX ADMIN — LIDOCAINE 1 PATCH: 50 PATCH CUTANEOUS at 09:27

## 2020-09-19 RX ADMIN — CALCITRIOL 0.25 MCG: 0.25 CAPSULE, LIQUID FILLED ORAL at 11:28

## 2020-09-19 RX ADMIN — OXYCODONE HYDROCHLORIDE 5 MG: 5 TABLET ORAL at 21:42

## 2020-09-19 RX ADMIN — POLYETHYLENE GLYCOL 3350, SODIUM SULFATE ANHYDROUS, SODIUM BICARBONATE, SODIUM CHLORIDE, POTASSIUM CHLORIDE 2000 ML: 236; 22.74; 6.74; 5.86; 2.97 POWDER, FOR SOLUTION ORAL at 17:34

## 2020-09-19 RX ADMIN — SODIUM CHLORIDE 8 MG/HR: 9 INJECTION, SOLUTION INTRAVENOUS at 03:06

## 2020-09-19 RX ADMIN — OXYCODONE HYDROCHLORIDE 5 MG: 5 TABLET ORAL at 05:33

## 2020-09-19 RX ADMIN — ACETAMINOPHEN 975 MG: 325 TABLET, FILM COATED ORAL at 05:25

## 2020-09-19 RX ADMIN — ACETAMINOPHEN 975 MG: 325 TABLET, FILM COATED ORAL at 13:31

## 2020-09-19 NOTE — ASSESSMENT & PLAN NOTE
· Patient anemic on presentation with Hemoglobin 5 7  · Status post 3 U PRBC transfusion   · Weakness and loss of energy for two weeks but worsening over the past two days  · Endorses dark stool, no jam blood; On presentation, the patient was hemodynamically stable in no acute distress  · CT abdomen and pelvis- stable large abdominal aortic aneurysm measuring 9 7 x 10 2 cm with stent graft  · U/S revealed AAA with endograft in place and no sign of endograft leak      · (Of note, recent hospitalization in mid- August for anemia of hgb 5 1 with undetermined cause (endoscopy showing sliding hiatal hernia and no signs of bleeding, colonoscopy with poor preparation and recommendation for repeat colonoscopy which has not been performed since; SPEP negative for monoclonal bands;  Heyde's syndrome and von willebrand disease were also ruled out; hgb 7 8 at discharge at the time)    Plan:  · Monitor Hemoglobin Q8H  · On protonix infusion   · EGD with no signs of aortoduodenal fistula or upper GI bleed  · Colonoscopy with poor bowel prep  · Continue to monitor stools   · Hemoglobin stable at 7 9  · Will discharge home on home hospice today

## 2020-09-19 NOTE — ASSESSMENT & PLAN NOTE
· On chart review, ESRD per records from [facility], with baseline Cr 1 7 to 1 8  Did state history of temporary dialysis during prior hospitalization, however was not on regular hemodialysis afterward     · Creatinine improving  · Continue quarter normal saline/D5 at 75cc/hr per nephrology

## 2020-09-19 NOTE — CONSULTS
Consultation - Vascular Surgery   Amy Bullock 80 y o  male MRN: [id number]  Unit/Bed#: [facility] 929-01 Encounter: [id number]    Assessment/Plan     Assessment:  Patient with history of EVAR about 4 years ago(patient not the best historian) here with melena and anemia  Vascular surgery is consulted for possibility of aortoenteric fistula  Due to patient acute on chronic kidney disease, the contrast CT scan was unable to be performed  However noncontrast CT was essentially unremarkable  ED also performed bedside u/s that did not show a leak  Likelihood of aortoenteral fistula is low as patient will likely have more significant bleeding    Plan: Will obtain formal abdominal u/s of the aortic aneurysm s/p EVAR  Consider upper and lower endoscopy  Plan per GI/primary  Transfuse as necessary  Can consider CTA if cleared by nephrology, will discuss with attending the need for it  History of Present Illness   History, ROS and PFSH unobtainable from any source due to  HPI:  Amy Bullock is a 80 y o  male who presents with 1 week of back pain and fatigue  Patient also states that he has been having black colored stools  Denies any jam blood  Denies any blood in the urine  Patient does not have any chest pain or shortness of breath  No nausea or vomiting  No fever or chills  No night sweats  Patient was recently admitted due to low hemoglobin  At that time, EGD and c-scope done but due to poor prep, did not really find a source  Patient also has history of metastatic prostate cancer with history of left nephrostomy tube placement  Consults    Review of Systems  11 systems reviewed and were negative except for the above      Historical Information   Past Medical History:   Diagnosis Date    AAA (abdominal aortic aneurysm) ([address] )     s/p repair, now w/ aneurysm distal to repair    CKD (chronic kidney disease)     unknown baseline    Hyperlipidemia     Hypertension     Prostate CA [facility])     s/p nephrostomy tube, w/ bone mets    Pulmonary nodule     23mm RUL    Severe aortic stenosis      History reviewed  No pertinent surgical history    Social History   Social History     Substance and Sexual Activity   Alcohol Use Never    Frequency: Never     Social History     Substance and Sexual Activity   Drug Use Never     E-Cigarette/Vaping    E-Cigarette Use Never User      E-Cigarette/Vaping Substances     Social History     Tobacco Use   Smoking Status Former Smoker   Smokeless Tobacco Never Used     Family History: non-contributory    Meds/Allergies   all current active meds have been reviewed and current meds:   Current Facility-Administered Medications   Medication Dose Route Frequency    acetaminophen (TYLENOL) tablet 975 mg  975 mg Oral Q8H [address]    atorvastatin (LIPITOR) tablet 40 mg  40 mg Oral Daily    bicalutamide (CASODEX) tablet 50 mg  50 mg Oral Daily    calcitriol (ROCALTROL) capsule 0 25 mcg  0 25 mcg Oral Daily    lidocaine (LIDODERM) 5 % patch 1 patch  1 patch Topical Daily    ondansetron (ZOFRAN) injection 4 mg  4 mg Intravenous Q4H PRN    oxyCODONE (ROXICODONE) IR tablet 2 5 mg  2 5 mg Oral Q4H PRN    oxyCODONE (ROXICODONE) IR tablet 5 mg  5 mg Oral Q4H PRN    pantoprazole (PROTONIX) 80 mg in sodium chloride 0 9 % 100 mL IVPB  80 mg Intravenous Once    And    pantoprazole (PROTONIX) 80 mg in sodium chloride 0 9 % 100 mL infusion  8 mg/hr Intravenous Continuous    senna-docusate sodium (SENOKOT S) 8 6-50 mg per tablet 1 tablet  1 tablet Oral BID    sodium chloride 0 9 % infusion  75 mL/hr Intravenous Continuous     No Known Allergies    Objective   First Vitals:   Blood Pressure: 132/60 (09/18/20 1548)  Pulse: 73 (09/18/20 1548)  Temperature: (!) 94 7 °F (34 8 °C) (09/18/20 1548)  Temp Source: (S) Tympanic (09/18/20 1548)  Respirations: 16 (09/18/20 1548)  Height: 5' 8" (172 7 cm) (09/18/20 2256)  Weight - Scale: 59 kg (130 lb) (09/18/20 1548)  SpO2: 100 % (09/18/20 1548)    Current Vitals:   Blood Pressure: (!) 125/47 (09/18/20 2256)  Pulse: 64 (09/18/20 2256)  Temperature: 98 2 °F (36 8 °C) (09/18/20 2256)  Temp Source: Oral (09/18/20 1830)  Respirations: 20 (09/18/20 2256)  Height: 5' 8" (172 7 cm) (09/18/20 2256)  Weight - Scale: 58 9 kg (129 lb 13 6 oz) (09/18/20 2256)  SpO2: 92 % (09/18/20 2256)      Intake/Output Summary (Last 24 hours) at 9/19/2020 0026  Last data filed at 9/18/2020 2112  Gross per 24 hour   Intake 350 ml   Output 375 ml   Net -25 ml       Invasive Devices     Peripheral Intravenous Line            Peripheral IV 09/18/20 Left Forearm less than 1 day    Peripheral IV 09/18/20 Left;Upper Arm less than 1 day          Drain            Nephrostomy Left -- days                Physical Exam   General: AAOx3, NAD  Pulm: normal effort, not tachypnic  Cards: RRR  Abd: Soft, non-tender, non-distended  Back: no wounds seen  Diffusely but mildly tender to touch from superior to inferior back  Extremities: b/l lower extremities warm  2+ DP pulses bilaterally  Feet warm to touch  CRT<3s  WILLIE: deferred      Lab Results:   I have personally reviewed pertinent lab results  , CBC:   Lab Results   Component Value Date    WBC 7 60 09/18/2020    HGB 5 7 (LL) 09/18/2020    HCT 20 6 (L) 09/18/2020    MCV 77 (L) 09/18/2020     09/18/2020    MCH 21 3 (L) 09/18/2020    MCHC 27 7 (L) 09/18/2020    RDW 21 8 (H) 09/18/2020    MPV 9 6 09/18/2020    NRBC 1 09/18/2020   , CMP:   Lab Results   Component Value Date    SODIUM 139 09/18/2020    K 5 0 09/18/2020     09/18/2020    CO2 28 09/18/2020    BUN 69 (H) 09/18/2020    CREATININE 2 87 (H) 09/18/2020    CALCIUM 10 0 09/18/2020    AST 52 (H) 09/18/2020    ALT 16 09/18/2020    ALKPHOS 276 (H) 09/18/2020    EGFR 19 09/18/2020     Imaging: I have personally reviewed pertinent reports     and I have personally reviewed pertinent films in PACS  EKG, Pathology, and Other Studies: I have personally reviewed pertinent reports  Counseling / Coordination of Care  Total floor / unit time spent today 30 minutes  Greater than 50% of total time was spent with the patient and / or family counseling and / or coordination of care

## 2020-09-19 NOTE — ED ATTENDING ATTESTATION
9/18/2020  IBo MD, saw and evaluated the patient  I have discussed the patient with the resident/non-physician practitioner and agree with the resident's/non-physician practitioner's findings, Plan of Care, and MDM as documented in the resident's/non-physician practitioner's note, except where noted  All available labs and Radiology studies were reviewed  I was present for key portions of any procedure(s) performed by the resident/non-physician practitioner and I was immediately available to provide assistance  At this point I agree with the current assessment done in the Emergency Department  I have conducted an independent evaluation of this patient a history and physical is as follows: This is an 42-year-old male who presents to the emergency department for global weakness  Patient is not from the area, and recently moved here about 1 month ago  He currently does not have a primary care doctor  Patient states that he has been feeling globally weak and having increasing back pain that has been progressively getting worse, with the pain being chronic and longstanding related to a malignancy with bony Mets, but much worse over the last week and intolerable today  Patient has been taking Tylenol for this pain without relief  The patient denies any fever  His daughter states that he has not been eating or drinking very well  She states that he is so weak that she is having a difficult time getting out of bed  She is attempting to get a primary care doctor, but has been having some issues with insurance  Patient's past medical history is remarkable for prostate cancer with spinal Mets  The patient also has a history of AAA with endovascular repair and subsequent leak  Patient has a history of anemia requiring transfusion in the past as well  At that time, the patient had workup including upper and lower endoscopy which was nondiagnostic    The patient has not noted blood in his stool   Overall, he is a somewhat poor historian and is somewhat reluctant answer questions  Although I spoke to the patient is primary language, he defers many of his answers to his daughter  The patient denies new chest pain or shortness of breath  He complains of back pain that is mostly in the center of his back and around his waist that is worse at night and is constant and aching  He denies numbness, tingling, or focal weakness  His review of systems otherwise negative in 12 systems were reviewed  On exam the patient is chronically ill-appearing  He is frail and pale  His vital signs are adequate  HEENT exam, the patient has markedly pale conjunctiva  His oropharynx is clear, but with pale mucosa  His mucous membranes are slightly tacky  The patient states is symmetric  His neck is supple and nontender with no JVD  His heart is irregular and he has a systolic murmur  The patient's lungs are clear bilaterally, but he has poor effort  He has good air movement in his upper lung fields  The patient's back is diffusely tender in the midline  He does not have any crepitus or step-offs on his ribs  The patient's abdomen is soft and minimally tender  He does not have any rebound, guarding, or percussion tenderness  Or has intact pulses distally, within normal skin exam   He is neurologically intact  Impression:  Symptomatic anemia, concern for endovascular leak, concern for GI bleeding, concern for underlying cardiac cause  IV access established  Patient is heme-negative from below  Type and screen drawn with other labs  Patient withpositive troponin  Patient with hemoglobin of 5  Consented for blood and given transfusion  Patient also with acute kidney injury here today, GFR less than 20  Therefore, CT scan with contrast deferred, but non con CT performed  Patient does not have any new pathological fractures  Bedside ultrasound with no clear evidence of endovascular leak    Will plan to admit the patient to the hospital for symptomatic anemia, acute kidney injury, NSTEMI  ED Course         Critical Care Time  CriticalCare Time  Performed by: Domo Martinez MD  Authorized by: Domo Martinez MD     Critical care provider statement:     Critical care time (minutes):  30    Critical care time was exclusive of:  Separately billable procedures and treating other patients and teaching time    Critical care was necessary to treat or prevent imminent or life-threatening deterioration of the following conditions:  Renal failure and circulatory failure    Critical care was time spent personally by me on the following activities:  Blood draw for specimens, obtaining history from patient or surrogate, development of treatment plan with patient or surrogate, discussions with consultants, discussions with primary provider, evaluation of patient's response to treatment, examination of patient, review of old charts, re-evaluation of patient's condition, ordering and review of radiographic studies, ordering and review of laboratory studies and ordering and performing treatments and interventions

## 2020-09-19 NOTE — CONSULTS
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, history of prostate cancer, hiatal hernia, severe aortic stenosis, MGUS who presents with diffuse pain throughout his body and fatigue and is being consulted for anemia/melena    Melena, anemia - unclear etiology at this time  Patient presented with hemoglobin of 5 7 and received 3 units and is up to 8 3  He states he has been having melena most days of the week for past month  He previously underwent endoscopic evaluation last month  EGD was unremarkable aside small hiatal hernia  Colonoscopy was aborted due to large amount of liquid stool  · Schedule EGD/colonoscopy Monday  Will start 2 day prep today given poor prep during previous colonoscopy  · Monitor blood counts and bowel movements  · Continue PPI drip  · Avoid NSAIDs    Metastatic prostate cancer - PT has a hx of prostate cancer with multiple metastasis noted to bones and bladder wall  As per previous records patient has been on radio and chemotherapy at [facility], recently relocated to the area  · Outpatient Oncology follow-up    AAA s/p remote repair -  As seen on CT scan patient does have nearly 10 cm aortic aneurysm status post endovascular graft  No other evidence of active bleeding  · Vascular surgery following    CKD - secondary to obstructive uropathy status post percutaneous nephrostomy     · Management per primary team       Severe aortic stenosis - As noted on the ECHO, gradient 39, area 0 9 cm2  No active pertinent symptoms  · No evidence of decompensation at this time  Underwent previous EGD/colonoscopy without issues so likely does not need cardiac clearance at this time  REASON FOR CONSULTATION      Anemia, melena      HISTORY OF PRESENT ILLNESS      Miriam Thomas is a 80 y o  old male with PMH of AAA status post EVAR, history of prostate cancer, hiatal hernia, severe aortic stenosis, MGUS who presents with diffuse pain throughout his body and fatigue and is being consulted for anemia/melena  Patient states with the past month has had melena for most of his bowel movements  He has also had more lethargy and fatigue during this time  He denies any abdominal pain, nausea, vomiting  He has no symptoms of anemia, denying lightheadedness, dizziness, fatigue  No other signs of overt bleeding  Does not use NSAIDs  He underwent endoscopic evaluation last month for anemia which was unremarkable with EGD in regards to source of bleed and poor prepped colonoscopy  Vital signs are stable upon admission  Hemoglobin down to 5 7 patient received 3 units was up to 8 3  He also had elevated creatinine  LFTs mildly elevated with AST 52, ALT 16, alk-phos 276, T bili normal   Does not use NSAIDs  Is not on blood thinners  No bowel movement since admission  REVIEW OF SYSTEMS     A thorough 12-point review of systems has been conducted  Pertinent positives and negatives are mentioned in the history of present illness  PAST MEDICAL & SURGICAL HISTORY      Past Medical History:   Diagnosis Date    AAA (abdominal aortic aneurysm) (HCC)     s/p repair, now w/ aneurysm distal to repair    CKD (chronic kidney disease)     unknown baseline    Hyperlipidemia     Hypertension     Prostate CA ([address] )     s/p nephrostomy tube, w/ bone mets    Pulmonary nodule     23mm RUL    Severe aortic stenosis        History reviewed  No pertinent surgical history        MEDICATIONS & ALLERGIES Medications:   Prior to Admission medications    Medication Sig Start Date End Date Taking?  Authorizing Provider   amLODIPine (NORVASC) 5 mg tablet Take 5 mg by mouth daily    Historical Provider, MD   atorvastatin (LIPITOR) 40 mg tablet Take 40 mg by mouth daily    Historical Provider, MD   b complex-C-folic acid (NEPHRO-YONIS) 0 8 mg tablet Take 0 8 mg by mouth daily with dinner    Historical Provider, MD   bicalutamide (CASODEX) 50 mg tablet Take 50 mg by mouth daily    Historical Provider, MD   calcitriol (ROCALTROL) 0 25 mcg capsule Take 0 25 mcg by mouth daily    Historical Provider, MD   diltiazem (dilTIAZem CD) 120 mg 24 hr capsule Take 120 mg by mouth daily    Historical Provider, MD       Allergies: No Known Allergies      SOCIAL HISTORY      Marital Status: Single    Substance Use History:   Social History     Substance and Sexual Activity   Alcohol Use Never    Frequency: Never     Social History     Tobacco Use   Smoking Status Former Smoker   Smokeless Tobacco Never Used     Social History     Substance and Sexual Activity   Drug Use Never         FAMILY HISTORY      Non-Contributory      PHYSICAL EXAM     Vitals:   Blood Pressure: 108/54 (09/19/20 0655)  Pulse: 57 (09/19/20 0655)  Temperature: 97 9 °F (36 6 °C) (09/19/20 0655)  Temp Source: Oral (09/19/20 0529)  Respirations: 18 (09/19/20 0655)  Height: 5' 8" (172 7 cm) (09/18/20 2256)  Weight - Scale: 58 9 kg (129 lb 13 6 oz) (09/18/20 2256)  SpO2: 95 % (09/19/20 0655)    Physical Exam:   GENERAL: NAD  HEENT:  NC/AT, no scleral icterus  CARDIAC:  RRR, +S1/S2  PULMONARY:  CTA B/L, no wheezing/rales/rhonci, non-labored breathing  ABDOMEN:  Soft, NT/ND, +BS, no rebound/guarding/rigidity, WILLIE shows melenic hard stool  Extremities:  No edema, cyanosis, or clubbing  NEUROLOGIC:  Alert  SKIN:  No rashes or erythema        ADDITIONAL DATA     Lab Results:     Results from last 7 days   Lab Units 09/19/20  1229 09/19/20  0603   WBC Thousand/uL  --  7 54 HEMOGLOBIN g/dL 9 0* 8 2*  8 3*   HEMATOCRIT %  --  26 2*  26 4*   PLATELETS Thousands/uL  --  196   NEUTROS PCT %  --  75   LYMPHS PCT %  --  12*   MONOS PCT %  --  8   EOS PCT %  --  4     Results from last 7 days   Lab Units 09/19/20  0603 09/18/20  1640   POTASSIUM mmol/L 4 5 5 0   CHLORIDE mmol/L 112* 103   CO2 mmol/L 25 28   BUN mg/dL 60* 69*   CREATININE mg/dL 2 08* 2 87*   CALCIUM mg/dL 9 6 10 0   ALK PHOS U/L  --  276*   ALT U/L  --  16   AST U/L  --  52*           Imaging:    Ct Chest Abdomen Pelvis Wo Contrast    Result Date: 9/18/2020  Narrative: CT CHEST, ABDOMEN AND PELVIS WITHOUT IV CONTRAST INDICATION:   AAA, monitoring evaluation for cancer metastasis  Prostate cancer COMPARISON:  8/14/2020 TECHNIQUE: CT examination of the chest, abdomen and pelvis was performed without intravenous contrast   Axial, sagittal, and coronal 2D reformatted images were created from the source data and submitted for interpretation  Radiation dose length product (DLP) for this visit:  550 45 mGy-cm   This examination, like all CT scans performed in the [facility], was performed utilizing techniques to minimize radiation dose exposure, including the use of iterative  reconstruction and automated exposure control  Enteric contrast was not administered  FINDINGS: CHEST LUNGS:  Severe emphysema  1 x 1 cm groundglass nodule in the right middle lobe series 3 image 81 is stable  Ill-defined right upper perihilar groundglass density image 64 series 3 appears stable  Bibasilar atelectasis  There is no tracheal or endobronchial lesion  PLEURA:  Unremarkable  HEART/GREAT VESSELS:  Coronary atherosclerosis  Hypodense blood pool suggesting anemia  MEDIASTINUM AND GEOVANNI:  Unremarkable  CHEST WALL AND LOWER NECK:   Unremarkable  ABDOMEN LIVER/BILIARY TREE:  Stable inferior right hepatic cyst  GALLBLADDER:  There are gallstone(s) within the gallbladder, without pericholecystic inflammatory changes   SPLEEN: Unremarkable  PANCREAS:  Pancreatic calcifications compatible with chronic pancreatitis  ADRENAL GLANDS:  Unremarkable  KIDNEYS/URETERS:  Left nephrostomy tube in place  Diminutive native kidneys  No hydronephrosis  Previously seen right hydronephrosis has decreased  Small bilateral renal hypodensities favoring cysts  STOMACH AND BOWEL:  Colon diverticulosis  APPENDIX:  No findings to suggest appendicitis  ABDOMINOPELVIC CAVITY:  No ascites  No pneumoperitoneum  No lymphadenopathy  VESSELS:  Very large abdominal aortic aneurysm appears stable measuring 9 7 x 10 2 cm  Aortobiiliac stent graft  PELVIS REPRODUCTIVE ORGANS:  Prominent prostate with calcifications appears stable  This protrudes into the bladder base  URINARY BLADDER:  Unremarkable  ABDOMINAL WALL/INGUINAL REGIONS:  Unremarkable  OSSEOUS STRUCTURES:  Widespread sclerotic and lytic osseous metastases again visualized  Impression: 1  Widespread osseous metastases again visualized  2   Stable large abdominal aortic aneurysm measuring 9 7 x 10 2 cm with stent graft  3   Stable prostatomegaly protruding into the bladder base  4   Stable right lung ground glass densities  Follow-up CT as previously recommended  5  Workstation performed: [id number]      Bedside Procedure    Result Date: 9/18/2020  Narrative: 0 [phone] [id number]      EKG, Pathology, and Other Studies Reviewed on Admission:   · EKG: Reviewed      Counseling / Coordination of Care Time: 30 total mins spent n consult  Greater than 50% of total time spent on patient counseling and coordination of care     ** Please Note: This note is constructed using a voice recognition dictation system   **

## 2020-09-19 NOTE — ASSESSMENT & PLAN NOTE
On chart review, ESRD per records from [facility], with baseline Cr 1 7 to 1 8  Did state history of temporary dialysis during prior hospitalization, however was not on regular hemodialysis afterward  - Continue to monitor creatinine   Current creatinine 2 87 with BUN 69   - Nephrology consult  - IV fluids

## 2020-09-19 NOTE — H&P
INTERNAL MEDICINE RESIDENCY ADMISSION H&P     Name: Cece Nicholas   Age & Sex: 80 y o  male   MRN: [id number]  Unit/Bed#: [facility] 929-01   Encounter: [id number]  Primary Care Provider: No primary care provider on file  Code Status: Level 3 - DNAR and DNI  Admission Status: INPATIENT   Disposition: Patient requires Med/Surg    Admit to team: SOD Team B     ASSESSMENT/PLAN     Principal Problem:    Symptomatic anemia  Active Problems:    History of prostate cancer    Chronic kidney disease    Attention to nephrostomy (HCC)    HTN (hypertension)    HLD (hyperlipidemia)    History of AAA (abdominal aortic aneurysm) repair    YULIET (acute kidney injury) ([facility] [address] )    Back pain    Elevated troponin      * Symptomatic anemia  Assessment & Plan  Patient anemic on presentation with hgb 5 7  Pt given 2 u pRBCs on admission  Weakness and loss of energy for two weeks but worsening over the past two days  Endorses dark stool, no jam blood; guaiac negative on admission  No diarrhea  Last BM yesterday  No hemoptysis or bloody emesis  On presentation, the patient was hemodynamically stable in no acute distress  CT abdomen and pelvis- stable large abdominal aortic aneurysm measuring 9 7 x 10 2 cm with stent graft  U/S revealed AAA with endograft in place and no sign of endograft leak  Symptomatic anemia, admit for possible GI bleed  Possible aortoenteral fistula though less likely       (Of note, recent hospitalization in mid- August for anemia of hgb 5 1 with undetermined cause (endoscopy showing sliding hiatal hernia and no signs of bleeding, colonoscopy with poor preparation and recommendation for repeat colonoscopy which has not been performed since; SPEP negative for monoclonal bands;  Heyde's syndrome and von willebrand disease were also ruled out; hgb 7 8 at discharge at the time)    Plan:  Serial H&H [id number]  Iron panel  Protonix drip  GI consult  Vascular consult  Transfuse for hgb <7  NPO past midnight    Elevated troponin  Assessment & Plan  Troponin 0 05 on admission  States that he has diffuse pain all over his body including chest pain that he is unable to describe further sometimes associated with dyspnea 2/2 pain  EKG showing NSR, no ST elevation/depression    Continue to trend    Back pain  Assessment & Plan  History of metastatic prostate cancer without hx of chemotherapy, hx of spinal mets per chart review  Hx of AAA s/p repair complicated by endoleak  Stable large abdominal aortic aneurysm measuring 9 7 x 10 2 cm with stent graft  U/S revealed AAA with endograft in place and no sign of endograft leak  Pain regimen  roxicodone 2 5mg q4 hours PRN moderate pain  roxicodone 5mg q4 hours PRN severe pain    YULIET (acute kidney injury) ([facility] [address] )  Assessment & Plan  On chart review, ESRD per records from [facility], with baseline Cr 1 7 to 1 8  Did state history of temporary dialysis during prior hospitalization, however was not on regular hemodialysis afterward  - Continue to monitor creatinine  Current creatinine 2 87 with BUN 69   - Nephrology consult  - IV fluids      History of AAA (abdominal aortic aneurysm) repair  Assessment & Plan  Hx of AAA s/p repair complicated by endoleak  Stable large abdominal aortic aneurysm measuring 9 7 x 10 2 cm with stent graft  U/S revealed AAA with endograft in place and no sign of endograft leak  Consider possible aortoenteral fistula though less likely  Vascular surgery consult    HLD (hyperlipidemia)  Assessment & Plan  Continue atorvastatin    HTN (hypertension)  Assessment & Plan  Will hold home meds at this time as unclear what patient is taking  Patient's pharmacy: [facility]  Closed at this time   Will need to call and clarify which medications the patient has been filling and taking    Attention to nephrostomy [facility])  Assessment & Plan  On chart review: Patient had nephrostomy tube placed  for 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  Per records from [facility], patient did have bilateral nephrostomy tubes placed, however now presents with only L side tube in place  - Patient reports no difficulties with nephrostomy at this time, denies any hematuria  - If any issues with nephrostomy develop, would consider consulting Urology  - Monitor urine output    Chronic kidney disease  Assessment & Plan  On chart review, ESRD per records from [facility], with baseline Cr 1 7 to 1 8  Did state history of temporary dialysis during prior hospitalization, however was not on regular hemodialysis afterward  - Continue to monitor creatinine  Current creatinine 2 87 with BUN 69   - Nephrology consult  - IV fluids    History of prostate cancer  Assessment & Plan  Hx of metastatic prostate cancer without hx of chemotherapy  Per chart review, hx of spinal mets  On chart review: Patient had nephrostomy tube placed  for 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  Per records from [facility], patient did have bilateral nephrostomy tubes placed, however now presents with only L side tube in place  - Patient reports no difficulties with nephrostomy at this time, denies any hematuria  - If any issues with nephrostomy develop, would consider consulting Urology  - Monitor urine output      VTE Pharmacologic Prophylaxis: Sequential compression device (Venodyne)   VTE Mechanical Prophylaxis: sequential compression device    CHIEF COMPLAINT     Chief Complaint   Patient presents with    Weakness - Generalized     pt presents by hospital wheelchair with c/o lower back pain for "a long time, can't take the pain anymore " nephrostomy tube present  pt reports pain all over and generalized weakness   hx cancer       HISTORY OF PRESENT ILLNESS     81yo M with PMH significant for metastatic prostate cancer without hx of chemotherapy (with spinal mets), nephrostomy tube placement, CKD, HTN, aortic stenosis, AAA s/p repair complicated by endoleak without follow up for around one year, with a recent hospitalization in mid- August for anemia of hgb 5 1 with undetermined cause (endoscopy showing sliding hiatal hernia and no signs of bleeding, colonoscopy with poor preparation and recommendation for repeat colonoscopy which has not been performed since; SPEP negative for monoclonal bands; Heyde's syndrome and von willebrand disease were also ruled out; hgb 7 8 at discharge), who presents for diffuse pain, weakness, and loss of energy for the past two weeks but acutely worsening over the past two days  States that he has diffuse pain all over his body including chest pain that he is unable to describe further sometimes associated with dyspnea 2/2 pain, but states that his most severe pain is in the bilateral lower back described as severe pressure-like pain, 10/10 in severity, not localizable, and described as different from prior back pain  Endorses intermittent diffuse abdominal pain as well without nausea, vomiting, diarrhea  Patient notes dark stools but no jam blood  Last bowel movement yesterday  Denies bloody emesis  Also endorses decreased appetite, dry painful lips  Denies fever, chills, fecal or bowel incontinence  On presentation, the patient was hemodynamically stable in no acute distress, although temperature was 94 7 but subsequently increased to 97 3  /60 and P 70s  Labs were significant for creatinine 2 87, BUN 69, corrected calcium 10 7, alk phos 276, elevated troponin 0 05, hgb 5 7  CT abd pelvis revealed stable AAA  U/S revealed AAA with endograft in place and no sign of endograft leak  2 units pRBC were transfused  REVIEW OF SYSTEMS     Review of Systems   Constitutional: Positive for fatigue  Negative for chills and fever  Respiratory: Negative for chest tightness, shortness of breath and wheezing  Cardiovascular: Positive for chest pain   Negative for palpitations and leg swelling  Gastrointestinal: Positive for abdominal pain  Negative for blood in stool (dark stool, no jam blood), nausea and vomiting  Genitourinary: Negative for enuresis  Musculoskeletal: Positive for back pain  Neurological: Positive for weakness  All other systems reviewed and are negative  OBJECTIVE     Vitals:    20 1915 20 2051 20 2135 20 2256   BP: 113/56 125/60 (!) 126/47 (!) 125/47   Pulse: 62 64 63 64   Resp:    Temp:  97 6 °F (36 4 °C) 98 4 °F (36 9 °C) 98 2 °F (36 8 °C)   TempSrc:   Oral    SpO2: 94%  94% 92%   Weight:    58 9 kg (129 lb 13 6 oz)   Height:    5' 8" (1 727 m)      Temperature:   Temp (24hrs), Av 1 °F (36 2 °C), Min:94 3 °F (34 6 °C), Max:98 4 °F (36 9 °C)    Temperature: 98 2 °F (36 8 °C)  Intake & Output:  I/O        07 -  0700  07 -  0700    Blood  350    Total Intake(mL/kg)  350 (5 9)    Net  +350              Weights:   IBW: 68 4 kg    Body mass index is 19 74 kg/m²  Weight (last 2 days)     Date/Time   Weight    20 22:56:24   58 9 (129 85)    20 1548   59 (130)            Physical Exam  Vitals signs reviewed  Constitutional:       General: He is not in acute distress  Comments: Nephrostomy tube   HENT:      Mouth/Throat:      Mouth: Mucous membranes are dry  Cardiovascular:      Rate and Rhythm: Normal rate and regular rhythm  Pulses: Normal pulses  Heart sounds: Murmur (aortic stenosis murmur) present  Pulmonary:      Effort: Pulmonary effort is normal       Breath sounds: Normal breath sounds  No wheezing, rhonchi or rales  Chest:      Chest wall: No tenderness  Abdominal:      General: Bowel sounds are normal  There is no distension or abdominal bruit  Palpations: Abdomen is soft  There is no pulsatile mass  Tenderness: There is abdominal tenderness (mildly tender to palpation diffusely)  There is no guarding or rebound        Comments: No palpable thrills   Genitourinary:     Rectum: Guaiac result negative  Musculoskeletal:        Arms:       Right lower leg: No edema  Left lower leg: No edema  Skin:     General: Skin is warm and dry  Neurological:      Mental Status: He is alert and oriented to person, place, and time  Psychiatric:         Mood and Affect: Mood normal        PAST MEDICAL HISTORY     Past Medical History:   Diagnosis Date    AAA [facility]abdominal aortic aneurysm) ([facility]     s/p repair, now w/ aneurysm distal to repair    CKD (chronic kidney disease)     unknown baseline    Hyperlipidemia     Hypertension     Prostate CA ([address] )     s/p nephrostomy tube, w/ bone mets    Pulmonary nodule     23mm RUL    Severe aortic stenosis      PAST SURGICAL HISTORY   History reviewed  No pertinent surgical history  SOCIAL & FAMILY HISTORY     Social History     Substance and Sexual Activity   Alcohol Use Never    Frequency: Never     Substance and Sexual Activity   Alcohol Use Never    Frequency: Never        Substance and Sexual Activity   Drug Use Never     Social History     Tobacco Use   Smoking Status Former Smoker   Smokeless Tobacco Never Used     History reviewed  No pertinent family history  LABORATORY DATA     Labs: I have personally reviewed pertinent reports      Results from last 7 days   Lab Units 09/18/20  2352 09/18/20  1640   WBC Thousand/uL  --  7 60   HEMOGLOBIN g/dL 6 7* 5 7*   HEMATOCRIT %  --  20 6*   PLATELETS Thousands/uL  --  273   NEUTROS PCT %  --  79*   MONOS PCT %  --  8      Results from last 7 days   Lab Units 09/18/20  1640   POTASSIUM mmol/L 5 0   CHLORIDE mmol/L 103   CO2 mmol/L 28   BUN mg/dL 69*   CREATININE mg/dL 2 87*   CALCIUM mg/dL 10 0   ALK PHOS U/L 276*   ALT U/L 16   AST U/L 52*                      Results from last 7 days   Lab Units 09/19/20  0150 09/18/20  2352 09/18/20  1940   TROPONIN I ng/mL 0 05* 0 06* 0 05*     Micro:  No results found for: Leatha Stanton, Tammy Villafuerte, SPUTUMCULTUR  IMAGING & DIAGNOSTIC TESTS     Imaging: I have personally reviewed pertinent reports  Ct Chest Abdomen Pelvis Wo Contrast    Result Date: 9/18/2020  Impression: 1  Widespread osseous metastases again visualized  2   Stable large abdominal aortic aneurysm measuring 9 7 x 10 2 cm with stent graft  3   Stable prostatomegaly protruding into the bladder base  4   Stable right lung ground glass densities  Follow-up CT as previously recommended  5  Workstation performed: [id number]     EKG, Pathology, and Other Studies: I have personally reviewed pertinent reports  ALLERGIES   No Known Allergies  MEDICATIONS PRIOR TO ARRIVAL     Prior to Admission medications    Medication Sig Start Date End Date Taking?  Authorizing Provider   amLODIPine (NORVASC) 5 mg tablet Take 5 mg by mouth daily    Historical Provider, MD   atorvastatin (LIPITOR) 40 mg tablet Take 40 mg by mouth daily    Historical Provider, MD   b complex-C-folic acid (NEPHRO-YONIS) 0 8 mg tablet Take 0 8 mg by mouth daily with dinner    Historical Provider, MD   bicalutamide (CASODEX) 50 mg tablet Take 50 mg by mouth daily    Historical Provider, MD   calcitriol (ROCALTROL) 0 25 mcg capsule Take 0 25 mcg by mouth daily    Historical Provider, MD   diltiazem (dilTIAZem CD) 120 mg 24 hr capsule Take 120 mg by mouth daily    Historical Provider, MD     MEDICATIONS ADMINISTERED IN LAST 24 HOURS     Medication Administration - last 24 hours from 09/18/2020 0229 to 09/19/2020 0229       Date/Time Order Dose Route Action Action by     09/18/2020 1703 HYDROmorphone (DILAUDID) injection 0 5 mg 0 5 mg Intravenous Given Victor Hugo Schmidt RN     09/19/2020 0108 pantoprazole (PROTONIX) 80 mg in sodium chloride 0 9 % 100 mL IVPB 80 mg Intravenous New Bag Basilia Jones RN     09/19/2020 0115 sodium chloride 0 9 % infusion 0 mL/hr Intravenous Stopped Basilia Jones RN     09/18/2020 2219 sodium chloride 0 9 % infusion 75 mL/hr Intravenous New Bag Basilia Jones RN 09/18/2020 2213 acetaminophen (TYLENOL) tablet 975 mg 975 mg Oral Given Giulia Polanco RN     09/18/2020 2213 senna-docusate sodium (SENOKOT S) 8 6-50 mg per tablet 1 tablet 1 tablet Oral Given Giulia Polanco RN     09/19/2020 0116 sodium chloride 0 9 % infusion 75 mL/hr Intravenous New Bag Giulia Polanco RN        CURRENT MEDICATIONS     pantoprozole (PROTONIX) infusion (Continuous), 8 mg/hr  sodium chloride, 75 mL/hr, Last Rate: 75 mL/hr (09/19/20 0116)          Admission Time  I spent 45 minutes admitting the patient  This involved direct patient contact where I performed a full history and physical, reviewing previous records, and reviewing laboratory and other diagnostic studies  Portions of the record may have been created with voice recognition software  Occasional wrong word or "sound a like" substitutions may have occurred due to the inherent limitations of voice recognition software    Read the chart carefully and recognize, using context, where substitutions have occurred     ==  Jose Serrato MD  [facility]  Internal Medicine Residency PGY-1

## 2020-09-19 NOTE — ASSESSMENT & PLAN NOTE
On chart review: Patient had nephrostomy tube placed  for 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  Per records from [facility], patient did have bilateral nephrostomy tubes placed, however now presents with only L side tube in place  - Patient reports no difficulties with nephrostomy at this time, denies any hematuria    - If any issues with nephrostomy develop, would consider consulting Urology  - Monitor urine output

## 2020-09-19 NOTE — CONSULTS
Consultation    Nephrology   Carlos Curtis 80 y o  male MRN: [id number]  Unit/Bed#: [facility] 929-01 Encounter: [id number]    History of Present Illness   Physician Requesting Consult: No att  providers found  Reason for Consult : -  acute kidney injury     ASSESSMENT/PLAN:   80 y o   male with pmh of metastatic prostate cancer, hypertension, AAA status post repair complicated by endoleak, history of Josue I needing short dialysis in 2019 and left nephrostomy tube placement with CKD stage 3/4 who was admitted for multiple complaints including weakness back pain body aches  Nephrology has been consulted for evaluation and management of acute kidney injury   Acute kidney injury (POA) on CKD stage 3/4:  - JOSUE most likely secondary to prerenal azotemia plus some component of injury due to severe anemia + increased susceptibility for acute kidney injury due to recent episode of acute kidney injury requiring dialysis in 2019  - After review of records it appears that the patient has a baseline Creatinine of 1 7-2 mg/dL  - patient was admitted with a creatinine of 2 87 mg/dL on 09/18/2020   - patient's creatinine today is at 2 08 mg/dL  - change IV fluids to quarter normal saline/D5 at 75 cc an hour while patient remains NPO  - check BMP, magnesium, phosphorus in a m   - had a detailed discussion with the patient with regards to his renal condition advised him all risks and benefits of undergoing CT with IV contrast from a renal perspective in order to evaluate his AAA repair due to risk of acute kidney injury secondary to PRACHI  Even advised patient small probability of needing renal replacement therapy due to him being moderate risk for Josue I  Would recommend holding off on CT with IV contrast until Josue I is resolved unless is emergent  If indeed is emergently then place patient on normal saline at 100 cc an hour for 5 hours pre and post procedure  - Await renal recovery    - CT chest abdomen pelvis from 09/18/2020 shows widespread osseous metastases  Stable large AAA with stent graft  Stable prostatomegaly radiating into the bladder  Stable right lung ground-glass densities  - Clinically patient is not uremic and there is no acute indication for renal replacement therapy (dialysis)  - Optimize hemodynamic status to avoid delay in renal recovery  - Place on a renal diet when allowed diet order    - Avoid nephrotoxins, adjust meds to appropriate GFR   - Strict I/O   - Daily weights  - Urinary retention protocol  - Most likely has underlying CKD secondary to prior episode of acute kidney injury dialysis requiring in 2019, some component of obstructive nephropathy  - will need to set up patient for follow up with Nephrology as an outpatient post hospitalization   Blood pressure:  - home medications:  Norvasc 5 mg p o  Q day, Cardizem 120 mg p o  Q day  - current medications:  None  - recommendations:  None  - Optimize hemodynamics   - Maintain MAP > 65mmHg  - Avoid BP fluctuations   H/H/anemia:  - most recent hemoglobin at 8 3 grams/deciliter  - maintain hemoglobin greater than 8 grams/deciliter  - admitted on 09/18/2020 with hemoglobin of 5 7 grams/deciliter  - status post PRBC transfusion  - iron studies showing iron 68 ferritin 78, T sat 33 %  - not a candidate for MARA due to malignancy     Acid-base electrolytes:  o Hypernatremia:    - Likely due to IV fluids  -  Most recent sodium at 144 mEq  - Change IV fluids to half normal saline    o Hyperkalemia:  - Likely secondary to decreased distal delivery  - Follow low-potassium diet  - Most recent potassium improved down to 4 5 mEq      o  Acid-base:    - Most recent bicarb improved down to 25     Volume status:  o  Clinically appears to be hypovolemic  Change IV fluids to quarter normal saline with D5 at 75 an hour     Other medical problems:    o Bone mineral disease: On calcitriol will hold for now due to borderline elevated calcium levels  Check iPTH  o History of AAA repair with endoleak:  See recommendations above with regards to clearance for CT scan with IV contrast   o History of obstructive nephropathy with left PCN in place: If any issues with drainage consult Urology  If any issues with decreased urine output recommend Dupree placement with Urology  o History of metastatic prostate cancer: Follow-up with Heme-Onc  Thanks for the consult  Will continue to follow  Please call with questions/ concerns  Above-mentioned orders and Plan was discussed with the team in [address] MD, FASN, 9/19/2020, 12:31 PM          HISTORY OF PRESENT ILLNESS:   Amy Bullock is a 80 y o   male with pmh of metastatic prostate cancer, hypertension, AAA status post repair complicated by endoleak, history of Josue I needing short dialysis in 2019 and left nephrostomy tube placement with CKD stage 3/4 who was admitted for multiple complaints including weakness back pain body aches  Nephrology has been consulted for evaluation and management of acute kidney injury  Upon admission patient was noted to have acute kidney injury with creatinine of 2 87 mg/dL prior records reveal his baseline was around 1 7-2 mg/dL  He was also noted to have a hemoglobin of 5 7 grams/deciliter  Status post 2 units PRBC transfusion  Patient seen and examined in his room earlier this a m  Has no complaints other than being hungry and tired and weak and aching all over  Is not a good historian overall denies any NSAID use  Denies following up with Nephrology anywhere  Reports he has had the nephrostomy tube in place since last year after his episode of acute kidney injury  History obtained from chart review and the patient    Consults    Review of Systems   Constitutional: Positive for appetite change and fatigue  Negative for chills  HENT: Negative for congestion  Respiratory: Negative for cough, shortness of breath and wheezing      Cardiovascular: Negative for leg swelling  Gastrointestinal: Negative for abdominal pain, constipation, diarrhea, nausea and vomiting  Genitourinary: Negative for difficulty urinating and dysuria  Musculoskeletal: Positive for back pain and myalgias  Neurological: Negative for dizziness and headaches  Psychiatric/Behavioral: Negative for agitation and confusion  All other systems reviewed and are negative  Historical Information   Patient Active Problem List   Diagnosis    Symptomatic anemia    History of prostate cancer    Chronic kidney disease    [facility]    HTN (hypertension)    HLD (hyperlipidemia)    Murmur, cardiac    History of AAA (abdominal aortic aneurysm) repair    Severe aortic stenosis    Moderate protein-calorie malnutrition (HCC)    Melena    Constipation    End stage renal disease (HCC)    GI bleeding    Shortness of breath    [facility]UL[facility] )    Back pain    Elevated troponin     Past Medical History:   Diagnosis Date    AAA [facility]abdominal aortic aneurysm) ([facility])     s/p repair, now w/ aneurysm distal to repair    CKD (chronic kidney disease)     unknown baseline    Hyperlipidemia     Hypertension     Prostate CA ([facility] )     s/p nephrostomy tube, w/ bone mets    Pulmonary nodule     23mm RUL    Severe aortic stenosis      History reviewed  No pertinent surgical history  Social History   Social History     Substance and Sexual Activity   Alcohol Use Never    Frequency: Never     Social History     Substance and Sexual Activity   Drug Use Never     Social History     Tobacco Use   Smoking Status Former Smoker   Smokeless Tobacco Never Used     History reviewed  No pertinent family history      Meds/Allergies   current meds:   Current Facility-Administered Medications   Medication Dose Route Frequency    acetaminophen (TYLENOL) tablet 975 mg  975 mg Oral Q8H [address]    atorvastatin (LIPITOR) tablet 40 mg  40 mg Oral Daily    bicalutamide (CASODEX) tablet 50 mg  50 mg Oral Daily    bisacodyl (DULCOLAX) EC tablet 20 mg  20 mg Oral Once    dextrose 5 % and sodium chloride 0 2 % infusion  75 mL/hr Intravenous Continuous    lidocaine (LIDODERM) 5 % patch 1 patch  1 patch Topical Daily    ondansetron (ZOFRAN) injection 4 mg  4 mg Intravenous Q4H PRN    oxyCODONE (ROXICODONE) IR tablet 2 5 mg  2 5 mg Oral Q4H PRN    oxyCODONE (ROXICODONE) IR tablet 5 mg  5 mg Oral Q4H PRN    pantoprazole (PROTONIX) 80 mg in sodium chloride 0 9 % 100 mL infusion  8 mg/hr Intravenous Continuous    polyethylene glycol (GOLYTELY) bowel prep 2,000 mL  2,000 mL Oral Once    senna-docusate sodium (SENOKOT S) 8 6-50 mg per tablet 1 tablet  1 tablet Oral BID    and PTA meds:   Prior to Admission Medications   Prescriptions Last Dose Informant Patient Reported? Taking?    amLODIPine (NORVASC) 5 mg tablet  Family Member Yes No   Sig: Take 5 mg by mouth daily   atorvastatin (LIPITOR) 40 mg tablet  Family Member Yes No   Sig: Take 40 mg by mouth daily   b complex-C-folic acid (NEPHRO-YONIS) 0 8 mg tablet  Family Member Yes No   Sig: Take 0 8 mg by mouth daily with dinner   bicalutamide (CASODEX) 50 mg tablet  Family Member Yes No   Sig: Take 50 mg by mouth daily   calcitriol (ROCALTROL) 0 25 mcg capsule  Family Member Yes No   Sig: Take 0 25 mcg by mouth daily   diltiazem (dilTIAZem CD) 120 mg 24 hr capsule  Family Member Yes No   Sig: Take 120 mg by mouth daily      Facility-Administered Medications: None       Scheduled Meds:  Current Facility-Administered Medications   Medication Dose Route Frequency Provider Last Rate    acetaminophen  975 mg Oral [facility] Watson Calderon, DO      atorvastatin  40 mg Oral Daily Watson Calderon, DO      bicalutamide  50 mg Oral Daily Watson Calderon, DO      bisacodyl  20 mg Oral Once Cherelle Lima MD      calcitriol  0 25 mcg Oral Daily Watson Calderon, DO      lidocaine  1 patch Topical Daily Watson Calderon, DO      ondansetron  4 mg Intravenous Q4H PRN Abbie Maravillae, DO      oxyCODONE  2 5 mg Oral Q4H PRN Abbie Jameseste, DO      oxyCODONE  5 mg Oral Q4H PRN Abbie Maravillae, DO      pantoprozole (PROTONIX) infusion (Continuous)  8 mg/hr Intravenous Continuous Abbie Aquino, DO 8 mg/hr (20 1128)    polyethylene glycol  2,000 mL Oral Once Yonathan Weaver MD      senna-docusate sodium  1 tablet Oral BID Abbie Aquino, DO         PRN Meds: ondansetron    oxyCODONE    oxyCODONE    Continuous Infusions:pantoprozole (PROTONIX) infusion (Continuous), 8 mg/hr, Last Rate: 8 mg/hr (20 112)        No Known Allergies      Invasive Devices: Invasive Devices     Peripheral Intravenous Line            Peripheral IV 20 Left Forearm less than 1 day    Peripheral IV 20 Left;Upper Arm less than 1 day          Drain            Nephrostomy Left -- days                  PHYSICAL EXAM  /54   Pulse 57   Temp 97 9 °F (36 6 °C)   Resp 18   Ht 5' 8" (1 727 m)   Wt 58 9 kg (129 lb 13 6 oz)   SpO2 95%   BMI 19 74 kg/m²   Temp (24hrs), Av 6 °F (36 4 °C), Min:94 3 °F (34 6 °C), Max:98 5 °F (36 9 °C)        Intake/Output Summary (Last 24 hours) at 2020 1231  Last data filed at 2020 1128  Gross per 24 hour   Intake 2148 84 ml   Output 1675 ml   Net 473 84 ml       I/O last 24 hours: In: 2148 8 [I V :1023 8; Blood:1025; IV Piggyback:100]  Out: 7757 [Urine:1675]          Current Weight: Weight - Scale: 58 9 kg (129 lb 13 6 oz)  First Weight: Weight - Scale: 59 kg (130 lb)  Physical Exam  Vitals signs and nursing note reviewed  Constitutional:       General: He is not in acute distress  Appearance: Normal appearance  He is ill-appearing  He is not toxic-appearing or diaphoretic  HENT:      Head: Normocephalic and atraumatic  Mouth/Throat:      Mouth: Mucous membranes are dry  Pharynx: Oropharynx is clear  No oropharyngeal exudate  Eyes:      General: No scleral icterus       Conjunctiva/sclera: Conjunctivae normal    Neck:      Musculoskeletal: Normal range of motion and neck supple  Cardiovascular:      Rate and Rhythm: Normal rate  Heart sounds: No friction rub  Pulmonary:      Effort: Pulmonary effort is normal  No respiratory distress  Breath sounds: Normal breath sounds  Abdominal:      General: Bowel sounds are normal  There is no distension  Palpations: Abdomen is soft  There is no mass  Comments: Left nephrostomy in place   Musculoskeletal:         General: No swelling  Skin:     General: Skin is warm and dry  Neurological:      General: No focal deficit present  Mental Status: He is alert and oriented to person, place, and time  Psychiatric:         Mood and Affect: Mood normal          Behavior: Behavior normal            LABORATORY:    Results from last 7 days   Lab Units 09/19/20  0603 09/18/20  2352 09/18/20  1640   WBC Thousand/uL 7 54  --  7 60   HEMOGLOBIN g/dL 8 2*  8 3* 6 7* 5 7*   HEMATOCRIT % 26 2*  26 4*  --  20 6*   PLATELETS Thousands/uL 196  --  273   POTASSIUM mmol/L 4 5  --  5 0   CHLORIDE mmol/L 112*  --  103   CO2 mmol/L 25  --  28   BUN mg/dL 60*  --  69*   CREATININE mg/dL 2 08*  --  2 87*   CALCIUM mg/dL 9 6  --  10 0      rest all reviewed    RADIOLOGY:  CT chest abdomen pelvis wo contrast   Final Result by Leonarda Mayberry MD (09/18 2010)   1  Widespread osseous metastases again visualized  2   Stable large abdominal aortic aneurysm measuring 9 7 x 10 2 cm with stent graft  3   Stable prostatomegaly protruding into the bladder base  4   Stable right lung ground glass densities  Follow-up CT as previously recommended  5                         Workstation performed: [id number]           Rest all reviewed    Portions of the record may have been created with voice recognition software  Occasional wrong word or "sound a like" substitutions may have occurred due to the inherent limitations of voice recognition software   Read the chart carefully and recognize, using context, where substitutions have occurred  If you have any questions, please contact the dictating provider

## 2020-09-19 NOTE — PROGRESS NOTES
INTERNAL MEDICINE RESIDENCY PROGRESS NOTE     Name: Collin Sorto   Age & Sex: 80 y o  male   MRN: [id number]  Unit/Bed#: [facility] 929-01   Encounter: [id number]  Team: SOD Team B     PATIENT INFORMATION     Name: Collin Sorto   Age & Sex: 80 y o  male   MRN: 25 [address] [place] Stay Days: 1    ASSESSMENT/PLAN     Principal Problem:    Symptomatic anemia  Active Problems:    History of prostate cancer    Chronic kidney disease    Attention to nephrostomy (HCC)    HTN (hypertension)    HLD (hyperlipidemia)    History of AAA (abdominal aortic aneurysm) repair    YULIET (acute kidney injury) ([facility] [address] )    Back pain    Elevated troponin      Elevated troponin  Assessment & Plan  Troponin 0 05 on admission  States that he has diffuse pain all over his body including chest pain that he is unable to describe further sometimes associated with dyspnea 2/2 pain  EKG showing NSR, no ST elevation/depression    Continue to trend    Back pain  Assessment & Plan  History of metastatic prostate cancer without hx of chemotherapy, hx of spinal mets per chart review  Hx of AAA s/p repair complicated by endoleak  Stable large abdominal aortic aneurysm measuring 9 7 x 10 2 cm with stent graft  U/S revealed AAA with endograft in place and no sign of endograft leak  Pain regimen  roxicodone 2 5mg q4 hours PRN moderate pain  roxicodone 5mg q4 hours PRN severe pain    YULIET (acute kidney injury) ([facility] [address] )  Assessment & Plan  On chart review, ESRD per records from [facility], with baseline Cr 1 7 to 1 8  Did state history of temporary dialysis during prior hospitalization, however was not on regular hemodialysis afterward  - Continue to monitor creatinine   Current creatinine 2 87 with BUN 69   - Nephrology consult  - IV fluids      History of AAA (abdominal aortic aneurysm) repair  Assessment & Plan  Hx of AAA s/p repair complicated by endoleak  Stable large abdominal aortic aneurysm measuring 9 7 x 10 2 cm with stent graft  U/S revealed AAA with endograft in place and no sign of endograft leak  Consider possible aortoenteral fistula though less likely  Vascular surgery consult  Consider CTA but given CKD will need to optimize first, nephro consult    HLD (hyperlipidemia)  Assessment & Plan  Continue atorvastatin    HTN (hypertension)  Assessment & Plan  Will hold home meds at this time as unclear what patient is taking  Patient's pharmacy: [facility]  Closed at this time  Will need to call and clarify which medications the patient has been filling and taking    Attention to nephrostomy [facility])  Assessment & Plan  On chart review: Patient had nephrostomy tube placed  for 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  Per records from [facility], patient did have bilateral nephrostomy tubes placed, however now presents with only L side tube in place  - Patient reports no difficulties with nephrostomy at this time, denies any hematuria  - If any issues with nephrostomy develop, would consider consulting Urology  - Monitor urine output    Chronic kidney disease  Assessment & Plan  On chart review, ESRD per records from [facility], with baseline Cr 1 7 to 1 8  Did state history of temporary dialysis during prior hospitalization, however was not on regular hemodialysis afterward  - Continue to monitor creatinine  Current creatinine 2 87 with BUN 69   - Nephrology consult  - IV fluids    History of prostate cancer  Assessment & Plan  Hx of metastatic prostate cancer without hx of chemotherapy  Per chart review, hx of spinal mets  On chart review: Patient had nephrostomy tube placed  for 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   Per records from [facility], patient did have bilateral nephrostomy tubes placed, however now presents with only L side tube in place  - Patient reports no difficulties with nephrostomy at this time, denies any hematuria  - If any issues with nephrostomy develop, would consider consulting Urology  - Monitor urine output    * Symptomatic anemia  Assessment & Plan  Patient anemic on presentation with hgb 5 7  Pt given 2 u pRBCs on admission  Weakness and loss of energy for two weeks but worsening over the past two days  Endorses dark stool, no jam blood; guaiac negative on admission  No diarrhea  Last BM yesterday  No hemoptysis or bloody emesis  On presentation, the patient was hemodynamically stable in no acute distress  CT abdomen and pelvis- stable large abdominal aortic aneurysm measuring 9 7 x 10 2 cm with stent graft  U/S revealed AAA with endograft in place and no sign of endograft leak  Symptomatic anemia, admit for possible GI bleed  Possible aortoenteral fistula though less likely  Consider CTA but given CKD will need to optimize first, nephro consult      (Of note, recent hospitalization in mid- August for anemia of hgb 5 1 with undetermined cause (endoscopy showing sliding hiatal hernia and no signs of bleeding, colonoscopy with poor preparation and recommendation for repeat colonoscopy which has not been performed since; SPEP negative for monoclonal bands; Heyde's syndrome and von willebrand disease were also ruled out; hgb 7 8 at discharge at the time)    Plan:  Serial H&H [id number]  Iron panel  Protonix drip  GI consult  Vascular consult  Nephrology consult  Transfuse for hgb <7  NPO past midnight        Disposition:  Continue inpatient level of care for workup of possible endovascular leak versus GI bleed    SUBJECTIVE     Patient seen and examined at bedside  No acute events overnight  Denies chest pain, SOB, diaphoresis, nausea/vomiting/diarrhea, fevers/chills       OBJECTIVE     Vitals:    09/19/20 0231 09/19/20 0313 09/19/20 0529 09/19/20 0529   BP: (!) 123/47 (!) 121/47 (!) 125/48 (!) 125/48   Pulse: 63 60 66 68   Resp: 20 20  20   Temp: 98 4 °F (36 9 °C) 98 3 °F (36 8 °C) 98 5 °F (36 9 °C) 98 5 °F (36 9 °C)   TempSrc:    Oral   SpO2: 96% 95% 93% 93%   Weight:       Height:          Temperature:   Temp (24hrs), Av 6 °F (36 4 °C), Min:94 3 °F (34 6 °C), Max:98 5 °F (36 9 °C)    Temperature: 98 5 °F (36 9 °C)  Intake & Output:  I/O        07 -  0700  07 -  0700    I V  (mL/kg)  435 4 (7 4)    Blood  1025    IV Piggyback  100    Total Intake(mL/kg)  1560 4 (26 5)    Urine (mL/kg/hr)  1225    Total Output  1225    Net  +335 4              Weights:   IBW: 68 4 kg    Body mass index is 19 74 kg/m²  Weight (last 2 days)     Date/Time   Weight    20 22:56:24   58 9 (129 85)    20 1548   59 (130)            Physical Exam  Constitutional:       General: He is not in acute distress  Appearance: He is well-developed  He is not diaphoretic  HENT:      Head: Normocephalic and atraumatic  Nose: Nose normal       Mouth/Throat:      Pharynx: No oropharyngeal exudate  Eyes:      General: No scleral icterus  Right eye: No discharge  Left eye: No discharge  Conjunctiva/sclera: Conjunctivae normal    Neck:      Musculoskeletal: Neck supple  Vascular: No JVD  Cardiovascular:      Rate and Rhythm: Normal rate and regular rhythm  Heart sounds: Normal heart sounds  No murmur  No friction rub  No gallop  Pulmonary:      Effort: Pulmonary effort is normal  No respiratory distress  Breath sounds: Normal breath sounds  No wheezing or rales  Abdominal:      General: Bowel sounds are normal  There is no distension  Palpations: Abdomen is soft  Tenderness: There is no abdominal tenderness  There is no guarding or rebound  Musculoskeletal: Normal range of motion  Skin:     General: Skin is warm and dry  Findings: No erythema  Neurological:      Mental Status: He is alert and oriented to person, place, and time  LABORATORY DATA     Labs: I have personally reviewed pertinent reports    Results from last 7 days   Lab Units 09/19/20  0603 09/18/20  2352 09/18/20  1640   WBC Thousand/uL 7 54  --  7 60   HEMOGLOBIN g/dL 8 2*  8 3* 6 7* 5 7*   HEMATOCRIT % 26 2*  26 4*  --  20 6*   PLATELETS Thousands/uL 196  --  273   NEUTROS PCT % 75  --  79*   MONOS PCT % 8  --  8      Results from last 7 days   Lab Units 09/18/20  1640   POTASSIUM mmol/L 5 0   CHLORIDE mmol/L 103   CO2 mmol/L 28   BUN mg/dL 69*   CREATININE mg/dL 2 87*   CALCIUM mg/dL 10 0   ALK PHOS U/L 276*   ALT U/L 16   AST U/L 52*                      Results from last 7 days   Lab Units 09/19/20  0150 09/18/20  2352 09/18/20  1940   TROPONIN I ng/mL 0 05* 0 06* 0 05*       IMAGING & DIAGNOSTIC TESTING     Radiology Results: I have personally reviewed pertinent reports  Ct Chest Abdomen Pelvis Wo Contrast    Result Date: 9/18/2020  Impression: 1  Widespread osseous metastases again visualized  2   Stable large abdominal aortic aneurysm measuring 9 7 x 10 2 cm with stent graft  3   Stable prostatomegaly protruding into the bladder base  4   Stable right lung ground glass densities  Follow-up CT as previously recommended  5  Workstation performed: [id number]     Other Diagnostic Testing: I have personally reviewed pertinent reports      ACTIVE MEDICATIONS     Current Facility-Administered Medications   Medication Dose Route Frequency    acetaminophen (TYLENOL) tablet 975 mg  975 mg Oral Q8H [address]    atorvastatin (LIPITOR) tablet 40 mg  40 mg Oral Daily    bicalutamide (CASODEX) tablet 50 mg  50 mg Oral Daily    calcitriol (ROCALTROL) capsule 0 25 mcg  0 25 mcg Oral Daily    lidocaine (LIDODERM) 5 % patch 1 patch  1 patch Topical Daily    ondansetron (ZOFRAN) injection 4 mg  4 mg Intravenous Q4H PRN    oxyCODONE (ROXICODONE) IR tablet 2 5 mg  2 5 mg Oral Q4H PRN    oxyCODONE (ROXICODONE) IR tablet 5 mg  5 mg Oral Q4H PRN    pantoprazole (PROTONIX) 80 mg in sodium chloride 0 9 % 100 mL infusion  8 mg/hr Intravenous Continuous    senna-docusate sodium (SENOKOT S) 8 6-50 mg per tablet 1 tablet  1 tablet Oral BID    sodium chloride 0 9 % infusion  75 mL/hr Intravenous Continuous       VTE Pharmacologic Prophylaxis: Reason for no pharmacologic prophylaxis GIB  VTE Mechanical Prophylaxis: sequential compression device    Portions of the record may have been created with voice recognition software  Occasional wrong word or "sound a like" substitutions may have occurred due to the inherent limitations of voice recognition software    Read the chart carefully and recognize, using context, where substitutions have occurred   ==  Giacomo Logan DO  [address]  Internal Medicine Residency PGY-3

## 2020-09-19 NOTE — ASSESSMENT & PLAN NOTE
Will hold home meds at this time as unclear what patient is taking  Patient's pharmacy: [facility]  Closed at this time   Will need to call and clarify which medications the patient has been filling and taking

## 2020-09-19 NOTE — ASSESSMENT & PLAN NOTE
· History of metastatic prostate cancer without hx of chemotherapy, hx of spinal mets per chart review  · Hx of AAA s/p repair complicated by endoleak  · Stable large abdominal aortic aneurysm measuring 9 7 x 10 2 cm with stent graft  · U/S revealed AAA with endograft in place and no sign of endograft leak       · Pain regimen  · Scheduled tylenol  · Lidocaine patch   · roxicodone 2 5mg q4 hours PRN moderate pain  · roxicodone 5mg q4 hours PRN severe pain  · Continues to have pain, will consult palliative care

## 2020-09-19 NOTE — UTILIZATION REVIEW
Notification of Inpatient Admission/Inpatient Authorization Request   This is a Notification of Inpatient Admission for [facility]  Be advised that this patient was admitted to our facility under Inpatient Status  Contact Shi Payne at [phone] for additional admission information  [facility]  DEDICATED -[phone]  Patient Name:   Brenda Fields   YOB: 1934       [address]   [address]:   Keila Herr  Tax ID: [id number]  NPI: [id number] Attending Provider/NPI:  Phone:  Address:   [phone]  Same as [address] of Service Code: 24 Place of Service Name:  [address] [place] [address]   Start Date: 9/18/20 2020 Discharge Date & Time: No discharge date for patient encounter  Type of Admission: Inpatient Status Discharge Disposition (if discharged): Home/Self Care   Patient Diagnoses: Weakness [R53 1]  Anemia [D64 9]  Cancer associated pain [G89 3]  Weakness [R53 1]   Orders: Admission Orders (From admission, onward)   Ordered     09/18/20 2020 Inpatient Admission  Once                 Assigned Utilization Review Contact: Shi Payne  Utilization   Network Utilization Review Department  Phone: [phone]; Fax [phone]  Email: [email or web address]   ATTENTION PAYERS: Please call the assigned Utilization  directly with any questions or concerns ALL voicemails in the department are confidential  Send all requests for admission clinical reviews, approved or denied determinations and any other requests to dedicated fax number belonging to the campus where the patient is receiving treatment

## 2020-09-19 NOTE — ASSESSMENT & PLAN NOTE
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

## 2020-09-19 NOTE — PLAN OF CARE
Problem: Potential for Falls  Goal: Patient will remain free of falls  Description: INTERVENTIONS:  - Assess patient frequently for physical needs  -  Identify cognitive and physical deficits and behaviors that affect risk of falls    -  [place] fall precautions as indicated by assessment   - Educate patient/family on patient safety including physical limitations  - Instruct patient to call for assistance with activity based on assessment  - Modify environment to reduce risk of injury  - Consider OT/PT consult to assist with strengthening/mobility  Outcome: Progressing     Problem: PAIN - ADULT  Goal: Verbalizes/displays adequate comfort level or baseline comfort level  Description: Interventions:  - Encourage patient to monitor pain and request assistance  - Assess pain using appropriate pain scale  - Administer analgesics based on type and severity of pain and evaluate response  - Implement non-pharmacological measures as appropriate and evaluate response  - Consider cultural and social influences on pain and pain management  - Notify physician/advanced practitioner if interventions unsuccessful or patient reports new pain  Outcome: Progressing     Problem: INFECTION - ADULT  Goal: Absence or prevention of progression during hospitalization  Description: INTERVENTIONS:  - Assess and monitor for signs and symptoms of infection  - Monitor lab/diagnostic results  - Monitor all insertion sites, i e  indwelling lines, tubes, and drains  - Monitor endotracheal if appropriate and nasal secretions for changes in amount and color  - [place] appropriate cooling/warming therapies per order  - Administer medications as ordered  - Instruct and encourage patient and family to use good hand hygiene technique  - Identify and instruct in appropriate isolation precautions for identified infection/condition  Outcome: Progressing  Goal: Absence of fever/infection during neutropenic period  Description: INTERVENTIONS:  - Monitor WBC    Outcome: Progressing     Problem: SAFETY ADULT  Goal: Patient will remain free of falls  Description: INTERVENTIONS:  - Assess patient frequently for physical needs  -  Identify cognitive and physical deficits and behaviors that affect risk of falls    -  [place] fall precautions as indicated by assessment   - Educate patient/family on patient safety including physical limitations  - Instruct patient to call for assistance with activity based on assessment  - Modify environment to reduce risk of injury  - Consider OT/PT consult to assist with strengthening/mobility  Outcome: Progressing  Goal: Maintain or return to baseline ADL function  Description: INTERVENTIONS:  -  Assess patient's ability to carry out ADLs; assess patient's baseline for ADL function and identify physical deficits which impact ability to perform ADLs (bathing, care of mouth/teeth, toileting, grooming, dressing, etc )  - Assess/evaluate cause of self-care deficits   - Assess range of motion  - Assess patient's mobility; develop plan if impaired  - Assess patient's need for assistive devices and provide as appropriate  - Encourage maximum independence but intervene and supervise when necessary  - Involve family in performance of ADLs  - Assess for home care needs following discharge   - Consider OT consult to assist with ADL evaluation and planning for discharge  - Provide patient education as appropriate  Outcome: Progressing  Goal: Maintain or return mobility status to optimal level  Description: INTERVENTIONS:  - Assess patient's baseline mobility status (ambulation, transfers, stairs, etc )    - Identify cognitive and physical deficits and behaviors that affect mobility  - Identify mobility aids required to assist with transfers and/or ambulation (gait belt, sit-to-stand, lift, walker, cane, etc )  - [place] fall precautions as indicated by assessment  - Record patient progress and toleration of activity level on Mobility SBAR; progress patient to next Phase/Stage  - Instruct patient to call for assistance with activity based on assessment  - Consider rehabilitation consult to assist with strengthening/weightbearing, etc   Outcome: Progressing     Problem: DISCHARGE PLANNING  Goal: Discharge to home or other facility with appropriate resources  Description: INTERVENTIONS:  - Identify barriers to discharge w/patient and caregiver  - Arrange for needed discharge resources and transportation as appropriate  - Identify discharge learning needs (meds, wound care, etc )  - Arrange for interpretive services to assist at discharge as needed  - Refer to Case Management Department for coordinating discharge planning if the patient needs post-hospital services based on physician/advanced practitioner order or complex needs related to functional status, cognitive ability, or social support system  Outcome: Progressing     Problem: Knowledge Deficit  Goal: Patient/family/caregiver demonstrates understanding of disease process, treatment plan, medications, and discharge instructions  Description: Complete learning assessment and assess knowledge base    Interventions:  - Provide teaching at level of understanding  - Provide teaching via preferred learning methods  Outcome: Progressing Implemented All Fall with Harm Risk Interventions:  [place] to call system. Call bell, personal items and telephone within reach. Instruct patient to call for assistance. Room bathroom lighting operational. Non-slip footwear when patient is off stretcher. Physically safe environment: no spills, clutter or unnecessary equipment. Stretcher in lowest position, wheels locked, appropriate side rails in place. Provide visual cue, wrist band, yellow gown, etc. Monitor gait and stability. Monitor for mental status changes and reorient to person, place, and time. Review medications for side effects contributing to fall risk. Reinforce activity limits and safety measures with patient and family. Provide visual clues: red socks.

## 2020-09-19 NOTE — ASSESSMENT & PLAN NOTE
Hx of AAA s/p repair complicated by endoleak  Stable large abdominal aortic aneurysm measuring 9 7 x 10 2 cm with stent graft  U/S revealed AAA with endograft in place and no sign of endograft leak  Consider possible aortoenteral fistula though less likely   Vascular surgery consult  Consider CTA but given CKD will need to optimize first, nephro consult

## 2020-09-19 NOTE — PROGRESS NOTES
INTERNAL MEDICINE RESIDENCY SENIOR ADMISSION NOTE     Name: Amy Bullokc   Age & Sex: 80 y o  male   MRN: [id number]  Unit/Bed#: [facility] 929-01   Encounter: [id number]  Primary Care Provider: No primary care provider on file  Patient seen and examined  Reviewed H&P per Dr Jame Hillman  This is a 59-year-old male past medical history significant for anemia, CKD, hypertension, metastatic prostate cancer, status post nephrostomy tube replacement, history of AAA repair, severe aortic stenosis, history of GI bleed who presented on 09/18/2020 secondary to generalized weakness, patient on presentation was complaining of weakness with increasing pain, nonfocal weakness, as well as all complaining of generalized pain mostly the ribs in the bed, he also admitted to some left-sided chest pain as well, previous chart review does show the patient has hemoglobin of 5 1 in in July 2020 that time it an EGD colonoscopydone during that hospitalization, which was negative no signs of bleeding, colonoscopy showed no  signs of bleeding either but prep was poor, that time the recommendation was for repeat colonoscopy, this was not performed  On presentation to the ED patient had a CBC is notable for hemoglobin 5 7, platelets normal, white blood cell count normal, CMP was notable for a BUN of 69, creatinine 2 7, glucose of 208, elevated calcium  at 10 7  Alkaline phosphatase was elevated as well as AST, troponin was 0 05 patient had a EKG that was obtained which was notable for normal axis, sinus rhythm, baseline artifact was present, QRS 96, , normal [place] intervals, rate was normal   No ST segment elevation or depression was noted  Poor R-wave progression with noted  Patient does have history of endovascular repair of his AAA, bedside ED procedure of AAA was notable for a new graft placed, with no sign of endograft leak  The 8 cm aneurysm was again identified    Patient had widespread osseous mass noted on CT chest abdomen pelvis as well as a stable large abdominal aortic aneurysm, with stable right lung ground-glass densities  SOD was called to admit the patient to the floor  Patient questioning this a m  States that he has had generalized weakness for the past 2 days, this has been associated with pain located essentially throughout his body, however has been worse on his lower back in particular, but in no particular spot in his back  Julita Cunning He reports that he has had no further bleeding today, he states that his last bowel movement was 2 days ago  He tells me he has not really had chest pain, but just been generalized weakness and pain  No syncopal or presyncopal symptoms  No worsening abdominal pain from his baseline  On physical exam no palpable thrills noted on his abdominal exam, no associated bruits noted  Will admit the patient for treat him as a GI bleed, although given his endovascular repair there would be concern for leakage of his AAA, however the the ultrasound performed by the ED physician of his AAA did not show any evidence of leakage  NPO, IV fluids, pain management, serial H&H, Protonix drip  At this time although extremely rare, do not feel we can definitively rule out aorta enteric fistula, although given lack of melena on exam, no further bleeding, feel that it is unlikely that patient is currently bleeding, and given previous history of dialysis and YULIET on CKD want to optimize patient prior to CTA  Will give patient blood, optimize with fluids, obtain vascular consult, obtain nephrology consult  Will hold patients anti hypotensives, unclear if patient is truly taking his meds as the daughter cannot provide any information regarding his meds and his pharmacy is closed and he has not received any refills since being in [place]       Principal Problem:    Anemia  Active Problems:    History of prostate cancer    Chronic kidney disease    Attention to nephrostomy (HCC)    HTN (hypertension) HLD (hyperlipidemia)    History of AAA (abdominal aortic aneurysm) repair      Code Status: Level 3 - DNAR and DNI  Admission Status: INPATIENT   Disposition: Patient requires Med/Surg  Expected Length of Stay: greater than 2 midnights

## 2020-09-20 LAB
ANION GAP SERPL CALCULATED.3IONS-SCNC: 5 MMOL/L (ref 4–13)
BASOPHILS # BLD AUTO: 0.01 THOUSANDS/ΜL (ref 0–0.1)
BASOPHILS NFR BLD AUTO: 0 % (ref 0–1)
BUN SERPL-MCNC: 45 MG/DL (ref 5–25)
CALCIUM SERPL-MCNC: 9.9 MG/DL (ref 8.3–10.1)
CHLORIDE SERPL-SCNC: 110 MMOL/L (ref 100–108)
CO2 SERPL-SCNC: 24 MMOL/L (ref 21–32)
CREAT SERPL-MCNC: 2 MG/DL (ref 0.6–1.3)
EOSINOPHIL # BLD AUTO: 0.18 THOUSAND/ΜL (ref 0–0.61)
EOSINOPHIL NFR BLD AUTO: 2 % (ref 0–6)
ERYTHROCYTE [DISTWIDTH] IN BLOOD BY AUTOMATED COUNT: 20.9 % (ref 11.6–15.1)
GFR SERPL CREATININE-BSD FRML MDRD: 29 ML/MIN/1.73SQ M
GLUCOSE SERPL-MCNC: 126 MG/DL (ref 65–140)
HCT VFR BLD AUTO: 30.7 % (ref 36.5–49.3)
HGB BLD-MCNC: 8.6 G/DL (ref 12–17)
HGB BLD-MCNC: 8.8 G/DL (ref 12–17)
HGB BLD-MCNC: 9.4 G/DL (ref 12–17)
IMM GRANULOCYTES # BLD AUTO: 0.08 THOUSAND/UL (ref 0–0.2)
IMM GRANULOCYTES NFR BLD AUTO: 1 % (ref 0–2)
LYMPHOCYTES # BLD AUTO: 0.99 THOUSANDS/ΜL (ref 0.6–4.47)
LYMPHOCYTES NFR BLD AUTO: 10 % (ref 14–44)
MAGNESIUM SERPL-MCNC: 2.5 MG/DL (ref 1.6–2.6)
MCH RBC QN AUTO: 24.4 PG (ref 26.8–34.3)
MCHC RBC AUTO-ENTMCNC: 30.6 G/DL (ref 31.4–37.4)
MCV RBC AUTO: 80 FL (ref 82–98)
MONOCYTES # BLD AUTO: 0.65 THOUSAND/ΜL (ref 0.17–1.22)
MONOCYTES NFR BLD AUTO: 7 % (ref 4–12)
NEUTROPHILS # BLD AUTO: 8.03 THOUSANDS/ΜL (ref 1.85–7.62)
NEUTS SEG NFR BLD AUTO: 80 % (ref 43–75)
NRBC BLD AUTO-RTO: 0 /100 WBCS
PHOSPHATE SERPL-MCNC: 3.4 MG/DL (ref 2.3–4.1)
PLATELET # BLD AUTO: 237 THOUSANDS/UL (ref 149–390)
PMV BLD AUTO: 9.3 FL (ref 8.9–12.7)
POTASSIUM SERPL-SCNC: 4.4 MMOL/L (ref 3.5–5.3)
PTH-INTACT SERPL-MCNC: 41.7 PG/ML (ref 18.4–80.1)
RBC # BLD AUTO: 3.85 MILLION/UL (ref 3.88–5.62)
SODIUM SERPL-SCNC: 139 MMOL/L (ref 136–145)
WBC # BLD AUTO: 9.94 THOUSAND/UL (ref 4.31–10.16)

## 2020-09-20 PROCEDURE — 80048 BASIC METABOLIC PNL TOTAL CA: CPT | Performed by: INTERNAL MEDICINE

## 2020-09-20 PROCEDURE — 97163 PT EVAL HIGH COMPLEX 45 MIN: CPT

## 2020-09-20 PROCEDURE — 84100 ASSAY OF PHOSPHORUS: CPT | Performed by: INTERNAL MEDICINE

## 2020-09-20 PROCEDURE — C9113 INJ PANTOPRAZOLE SODIUM, VIA: HCPCS | Performed by: INTERNAL MEDICINE

## 2020-09-20 PROCEDURE — 85018 HEMOGLOBIN: CPT | Performed by: INTERNAL MEDICINE

## 2020-09-20 PROCEDURE — 83970 ASSAY OF PARATHORMONE: CPT | Performed by: INTERNAL MEDICINE

## 2020-09-20 PROCEDURE — 99223 1ST HOSP IP/OBS HIGH 75: CPT | Performed by: FAMILY MEDICINE

## 2020-09-20 PROCEDURE — NC001 PR NO CHARGE: Performed by: FAMILY MEDICINE

## 2020-09-20 PROCEDURE — 83735 ASSAY OF MAGNESIUM: CPT | Performed by: INTERNAL MEDICINE

## 2020-09-20 PROCEDURE — 85025 COMPLETE CBC W/AUTO DIFF WBC: CPT | Performed by: INTERNAL MEDICINE

## 2020-09-20 PROCEDURE — 99233 SBSQ HOSP IP/OBS HIGH 50: CPT | Performed by: INTERNAL MEDICINE

## 2020-09-20 RX ORDER — SODIUM CHLORIDE, SODIUM GLUCONATE, SODIUM ACETATE, POTASSIUM CHLORIDE, MAGNESIUM CHLORIDE, SODIUM PHOSPHATE, DIBASIC, AND POTASSIUM PHOSPHATE .53; .5; .37; .037; .03; .012; .00082 G/100ML; G/100ML; G/100ML; G/100ML; G/100ML; G/100ML; G/100ML
75 INJECTION, SOLUTION INTRAVENOUS CONTINUOUS
Status: DISCONTINUED | OUTPATIENT
Start: 2020-09-20 | End: 2020-09-21

## 2020-09-20 RX ORDER — HYDROMORPHONE HCL/PF 1 MG/ML
0.5 SYRINGE (ML) INJECTION
Status: DISCONTINUED | OUTPATIENT
Start: 2020-09-20 | End: 2020-09-23 | Stop reason: HOSPADM

## 2020-09-20 RX ORDER — OXYCODONE HYDROCHLORIDE 10 MG/1
10 TABLET ORAL
Status: DISCONTINUED | OUTPATIENT
Start: 2020-09-20 | End: 2020-09-23 | Stop reason: HOSPADM

## 2020-09-20 RX ORDER — LIDOCAINE 50 MG/G
1 PATCH TOPICAL DAILY
Status: DISCONTINUED | OUTPATIENT
Start: 2020-09-20 | End: 2020-09-23 | Stop reason: HOSPADM

## 2020-09-20 RX ORDER — OXYCODONE HYDROCHLORIDE 5 MG/1
5 TABLET ORAL
Status: DISCONTINUED | OUTPATIENT
Start: 2020-09-20 | End: 2020-09-23 | Stop reason: HOSPADM

## 2020-09-20 RX ORDER — POLYETHYLENE GLYCOL 3350 17 G/17G
238 POWDER, FOR SOLUTION ORAL ONCE
Status: COMPLETED | OUTPATIENT
Start: 2020-09-20 | End: 2020-09-20

## 2020-09-20 RX ADMIN — DEXTROSE AND SODIUM CHLORIDE 75 ML/HR: 5; .2 INJECTION, SOLUTION INTRAVENOUS at 03:23

## 2020-09-20 RX ADMIN — SODIUM CHLORIDE 8 MG/HR: 9 INJECTION, SOLUTION INTRAVENOUS at 10:03

## 2020-09-20 RX ADMIN — BICALUTAMIDE 50 MG: 50 TABLET, FILM COATED ORAL at 09:01

## 2020-09-20 RX ADMIN — ATORVASTATIN CALCIUM 40 MG: 40 TABLET, FILM COATED ORAL at 08:03

## 2020-09-20 RX ADMIN — SODIUM CHLORIDE, SODIUM GLUCONATE, SODIUM ACETATE, POTASSIUM CHLORIDE, MAGNESIUM CHLORIDE, SODIUM PHOSPHATE, DIBASIC, AND POTASSIUM PHOSPHATE 75 ML/HR: .53; .5; .37; .037; .03; .012; .00082 INJECTION, SOLUTION INTRAVENOUS at 21:17

## 2020-09-20 RX ADMIN — DOCUSATE SODIUM AND SENNOSIDES 1 TABLET: 8.6; 5 TABLET ORAL at 08:03

## 2020-09-20 RX ADMIN — DOCUSATE SODIUM AND SENNOSIDES 1 TABLET: 8.6; 5 TABLET ORAL at 17:46

## 2020-09-20 RX ADMIN — ACETAMINOPHEN 975 MG: 325 TABLET, FILM COATED ORAL at 05:21

## 2020-09-20 RX ADMIN — LIDOCAINE 5% 1 PATCH: 700 PATCH TOPICAL at 09:01

## 2020-09-20 RX ADMIN — ACETAMINOPHEN 975 MG: 325 TABLET, FILM COATED ORAL at 14:37

## 2020-09-20 RX ADMIN — SODIUM CHLORIDE 8 MG/HR: 9 INJECTION, SOLUTION INTRAVENOUS at 21:16

## 2020-09-20 RX ADMIN — POLYETHYLENE GLYCOL 3350, SODIUM SULFATE ANHYDROUS, SODIUM BICARBONATE, SODIUM CHLORIDE, POTASSIUM CHLORIDE 4000 ML: 236; 22.74; 6.74; 5.86; 2.97 POWDER, FOR SOLUTION ORAL at 17:46

## 2020-09-20 RX ADMIN — SODIUM CHLORIDE, SODIUM GLUCONATE, SODIUM ACETATE, POTASSIUM CHLORIDE, MAGNESIUM CHLORIDE, SODIUM PHOSPHATE, DIBASIC, AND POTASSIUM PHOSPHATE 75 ML/HR: .53; .5; .37; .037; .03; .012; .00082 INJECTION, SOLUTION INTRAVENOUS at 11:57

## 2020-09-20 RX ADMIN — POLYETHYLENE GLYCOL 3350 238 G: 17 POWDER, FOR SOLUTION ORAL at 10:03

## 2020-09-20 RX ADMIN — ACETAMINOPHEN 975 MG: 325 TABLET, FILM COATED ORAL at 21:20

## 2020-09-20 RX ADMIN — OXYCODONE HYDROCHLORIDE 5 MG: 5 TABLET ORAL at 08:03

## 2020-09-20 RX ADMIN — BISACODYL 20 MG: 5 TABLET, COATED ORAL at 17:08

## 2020-09-20 NOTE — CONSULTS
Consultation - Palliative and Supportive Care   Ammon Todd 80 y o  male [id number]    Patient Active Problem List   Diagnosis    Symptomatic anemia    History of prostate cancer    Chronic kidney disease    Attention to nephrostomy ([facility] [address] )    HTN (hypertension)    HLD (hyperlipidemia)    Murmur, cardiac    History of AAA (abdominal aortic aneurysm) repair    Severe aortic stenosis    Moderate protein-calorie malnutrition (HCC)    Melena    Constipation    End stage renal disease (HCC)    GI bleeding    Shortness of breath    YULIET (acute kidney injury) (HCC)    Back pain    Elevated troponin     Plan:  1  Symptom management - pt with severe pain related to multiple challenges, including AAA and presumed Stage IV cancer, mets to bone   - Withhold steroids and NSAIDS at this time, given GIB from uncertain source   - NO MORPHINE PRODUCTS - Pt has CKD3-4   - Defer bowel regimen to GI team in advance of endoscopies  - Continue with oxyIR, but will allow increased dose  - IV hydromorphone may be used for severe/breakthrough pain    2  Goals - full cares, with limit of DNR/DNI   - Will respect Dr Tino Teran limits on safe use of RRT  Pt has multiple other advanced illnesses besides his CKD, and may not be a good candidate for ongoing RRT  - Cancer does not appear to be an urgent issue at this exact moment; f/up with outpt Med/Onc in clinic is reasonable  - Appreciate VascSurg recs: non-contrast testing may be very helpful to sufficiently r/o AEF     Code Status: DNR/DNI - Level 3   Decisional apparatus:  Patient is competent on my exam today  If competence is lost, patient's substitute decision maker would default to logan Paiz by [place] Act 169     Advance Directive / Living Will / POLST:  None on file     I have reviewed the patient's controlled substance dispensing history in the Prescription Drug Monitoring Program in compliance with the [facility] regulations before prescribing any controlled substances  We appreciate the invitation to be involved in this patient's care  We will continue to follow  Please do not hesitate to reach our on call provider through our clinic answering service at  should you have acute symptom control concerns  Gabriella Durham MD  Palliative and Supportive Care  Clinic/Answering Service: [phone]  You can find me on [employer]! IDENTIFICATION:  Inpatient consult to Palliative Care  Consult performed by: Gabriella Durham MD  Consult ordered by: Raul Malin MD        Physician Requesting Consult: No att  providers found  Reason for Consult / Principal Problem: pain  Hx and PE limited by: Sammarinese-language barrier circumvented with my limited Sammarinese skills; Pt's daughter Allie Wells assists as well    HISTORY OF PRESENT ILLNESS:       Nehemias Yanes is a 80 y o  male who presents with severe pain and weakness  This has been worsened over past few weeks, per H&P  Pt's history is rather dramatic, including AAA s/p EVAR, CKD, AS, and symptomatic anemia just one month ago in August with a charlie of 5 1  He has described melena, and is in process of prep for endoscopies  We are consulted for his pain, which appears mostly related to his concomitant Stage IV prostate cancer, with mets to bone and h/o obstructive uropathy with bilat PNT placement  At bedside, pt is present with his daughter  He confirms that he wishes to better understand the source of bleeding, and if someething can be done about it  Allie Wells articulates that she wishes NOT to pursue major surgery, but she and he both accept endoscopic investigations  We discussed his pain, and he is pleased with higher doses of opoids offered recently  As re: decisional apparatus, pt is competent, to my exam, and he has  his wife  There are only two children: Allie Shoemaker Daily    Pt has no advanced plan on file; we did not further discuss this item today prior to completing scans and studies as planned  Review of Systems   Constitution: Positive for decreased appetite and weight loss  Negative for weight gain  HENT: Negative for hoarse voice and nosebleeds  Eyes: Negative for vision loss in left eye and vision loss in right eye  Cardiovascular: Negative for chest pain and dyspnea on exertion  Respiratory: Negative for cough and shortness of breath  Endocrine: Negative for polydipsia, polyphagia and polyuria  Skin: Negative for flushing and itching  Musculoskeletal: Positive for back pain and muscle cramps  Negative for falls and joint swelling  Gastrointestinal: Positive for hematochezia, melena, nausea and vomiting  Negative for anorexia and jaundice  Genitourinary: Negative for frequency  Neurological: Negative for dizziness  Psychiatric/Behavioral: Negative for depression and memory loss  The patient is not nervous/anxious  Past Medical History:   Diagnosis Date    AAA (abdominal aortic aneurysm) (HCC)     s/p repair, now w/ aneurysm distal to repair    CKD (chronic kidney disease)     unknown baseline    Hyperlipidemia     Hypertension     Prostate CA ([address] )     s/p nephrostomy tube, w/ bone mets    Pulmonary nodule     23mm RUL    Severe aortic stenosis      History reviewed  No pertinent surgical history    Social History     Socioeconomic History    Marital status: Single     Spouse name: Not on file    Number of children: Not on file    Years of education: Not on file    Highest education level: Not on file   Occupational History    Not on file   Social Needs    Financial resource strain: Not on file    Food insecurity     Worry: Not on file     Inability: Not on file    Transportation needs     Medical: Not on file     Non-medical: Not on file   Tobacco Use    Smoking status: Former Smoker    Smokeless tobacco: Never Used   Substance and Sexual Activity    Alcohol use: Never     Frequency: Never    Drug use: Never    Sexual activity: Not on file   Lifestyle    Physical activity     Days per week: Not on file     Minutes per session: Not on file    Stress: Not on file   Relationships    Social connections     Talks on phone: Not on file     Gets together: Not on file     Attends [employer] service: Not on file     Active member of club or organization: Not on file     Attends meetings of clubs or organizations: Not on file     Relationship status: Not on file    Intimate partner violence     Fear of current or ex partner: Not on file     Emotionally abused: Not on file     Physically abused: Not on file     Forced sexual activity: Not on file   Other Topics Concern    Not on file   Social History Narrative    Not on file     History reviewed  No pertinent family history  MEDICATIONS / ALLERGIES:    all current active meds have been reviewed and current meds:   Current Facility-Administered Medications   Medication Dose Route Frequency    acetaminophen (TYLENOL) tablet 975 mg  975 mg Oral Q8H [address]    atorvastatin (LIPITOR) tablet 40 mg  40 mg Oral Daily    bicalutamide (CASODEX) tablet 50 mg  50 mg Oral Daily    bisacodyl (DULCOLAX) EC tablet 20 mg  20 mg Oral Once    lidocaine (LIDODERM) 5 % patch 1 patch  1 patch Topical Daily    multi-electrolyte (PLASMALYTE-A/ISOLYTE-S PH 7 4) IV solution  75 mL/hr Intravenous Continuous    ondansetron (ZOFRAN) injection 4 mg  4 mg Intravenous Q4H PRN    oxyCODONE (ROXICODONE) IR tablet 2 5 mg  2 5 mg Oral Q4H PRN    oxyCODONE (ROXICODONE) IR tablet 5 mg  5 mg Oral Q4H PRN    pantoprazole (PROTONIX) 80 mg in sodium chloride 0 9 % 100 mL infusion  8 mg/hr Intravenous Continuous    senna-docusate sodium (SENOKOT S) 8 6-50 mg per tablet 1 tablet  1 tablet Oral BID       No Known Allergies    OBJECTIVE:    Physical Exam  Physical Exam  Constitutional:       General: He is not in acute distress  Appearance: He is ill-appearing   He is not toxic-appearing or diaphoretic  Comments: Frail   HENT:      Head: Normocephalic and atraumatic  Right Ear: External ear normal       Left Ear: External ear normal    Eyes:      General:         Right eye: No discharge  Left eye: No discharge  Conjunctiva/sclera: Conjunctivae normal       Pupils: Pupils are equal, round, and reactive to light  Neck:      Trachea: No tracheal deviation  Cardiovascular:      Rate and Rhythm: Regular rhythm  Tachycardia present  Pulmonary:      Effort: Pulmonary effort is normal  No respiratory distress  Breath sounds: No stridor  Abdominal:      Palpations: Abdomen is soft  Comments: scaphoid   Musculoskeletal:         General: No swelling  Comments: Appears rather frail and sarcopenic   Skin:     General: Skin is warm and dry  Findings: No erythema or rash  Neurological:      General: No focal deficit present  Mental Status: He is alert and oriented to person, place, and time  Mental status is at baseline  Cranial Nerves: No cranial nerve deficit  Psychiatric:         Mood and Affect: Mood normal          Behavior: Behavior normal          Thought Content: Thought content normal          Judgment: Judgment normal          Lab Results:   I have personally reviewed pertinent labs  , CBC:   Lab Results   Component Value Date    WBC 9 94 09/20/2020    HGB 9 4 (L) 09/20/2020    HCT 30 7 (L) 09/20/2020    MCV 80 (L) 09/20/2020     09/20/2020    MCH 24 4 (L) 09/20/2020    MCHC 30 6 (L) 09/20/2020    RDW 20 9 (H) 09/20/2020    MPV 9 3 09/20/2020    NRBC 0 09/20/2020   , CMP:   Lab Results   Component Value Date    SODIUM 139 09/20/2020    K 4 4 09/20/2020     (H) 09/20/2020    CO2 24 09/20/2020    BUN 45 (H) 09/20/2020    CREATININE 2 00 (H) 09/20/2020    CALCIUM 9 9 09/20/2020    EGFR 29 09/20/2020   , BMP:  Lab Results   Component Value Date    SODIUM 139 09/20/2020    K 4 4 09/20/2020     (H) 09/20/2020    CO2 24 09/20/2020 BUN 45 (H) 09/20/2020    CREATININE 2 00 (H) 09/20/2020    GLUC 126 09/20/2020    CALCIUM 9 9 09/20/2020    AGAP 5 09/20/2020    EGFR 29 09/20/2020   , PT/PTT:No results found for: PT, PTT   Corrected Ca++ on admission was: 10 7, more consistent with renal disease and impaired evacuation than malignant hypercalcemia  Imaging Studies: reviewed current studies, as noted above  EKG, Pathology, and Other Studies: none pertinent    Counseling / Coordination of Care    Total floor / unit time spent today 90+ minutes  Greater than 50% of total time was spent with the patient and / or family counseling and / or coordination of care  A description of the counseling / coordination of care: chart review; symptom pursuit; adjustment of parenteral controlled substances for advanced pain and symptoms, and review of the patient's controlled substance dispensing history in the Prescription Drug Monitoring Program in compliance with the [facility] regulations before prescribing said controlled substances; review and assessment of decisional apparatus and capacity, applicable legal codes and extant documents; consideration of hospice

## 2020-09-20 NOTE — PROGRESS NOTES
INTERNAL MEDICINE RESIDENCY PROGRESS NOTE     Name: Pam Aguilar   Age & Sex: 80 y o  male   MRN: [id number]  Unit/Bed#: [facility] 929-01   Encounter: [id number]  Team: SOD Team B     PATIENT INFORMATION     Name: Pam Aguilar   Age & Sex: 80 y o  male   MRN: [id number] [place] Stay Days: 2    ASSESSMENT/PLAN     Principal Problem:    Symptomatic anemia  Active Problems:    History of prostate cancer    Chronic kidney disease    Attention to nephrostomy (HCC)    HTN (hypertension)    HLD (hyperlipidemia)    History of AAA (abdominal aortic aneurysm) repair    YULIET (acute kidney injury) ([facility] [address] )    Back pain    Elevated troponin      * Symptomatic anemia  Assessment & Plan  · Patient anemic on presentation with Hemoglobin 5 7  · Status post 3 U PRBC transfusion   · Weakness and loss of energy for two weeks but worsening over the past two days  · Endorses dark stool, no jam blood; On presentation, the patient was hemodynamically stable in no acute distress  · CT abdomen and pelvis- stable large abdominal aortic aneurysm measuring 9 7 x 10 2 cm with stent graft  · U/S revealed AAA with endograft in place and no sign of endograft leak      · (Of note, recent hospitalization in mid- August for anemia of hgb 5 1 with undetermined cause (endoscopy showing sliding hiatal hernia and no signs of bleeding, colonoscopy with poor preparation and recommendation for repeat colonoscopy which has not been performed since; SPEP negative for monoclonal bands;  Heyde's syndrome and von willebrand disease were also ruled out; hgb 7 8 at discharge at the time)    Plan:  · Monitor Hemoglobin Q8H  · On protonix infusion   · Patient was supposed to be given 2 day prep with golytely however he has not drank much of it yet, will change to glycolax with Gatorade so he might be able to tolerate the taste better  · Continues to have melena   · Plan for EGD and colonoscopy tomorrow as long as patient is able to prep  · Continue to monitor stools     Elevated troponin  Assessment & Plan  · Troponin 0 05 on admission  · In the setting of CKD, no ACS symptoms     Back pain  Assessment & Plan  · History of metastatic prostate cancer without hx of chemotherapy, hx of spinal mets per chart review  · Hx of AAA s/p repair complicated by endoleak  · Stable large abdominal aortic aneurysm measuring 9 7 x 10 2 cm with stent graft  · U/S revealed AAA with endograft in place and no sign of endograft leak  · Pain regimen  · Scheduled tylenol  · Lidocaine patch   · roxicodone 2 5mg q4 hours PRN moderate pain  · roxicodone 5mg q4 hours PRN severe pain  · Continues to have pain, will consult palliative care     YULIET (acute kidney injury) ([facility] [address] )  Assessment & Plan  · On chart review, ESRD per records from [facility], with baseline Cr 1 7 to 1 8  Did state history of temporary dialysis during prior hospitalization, however was not on regular hemodialysis afterward  · Creatinine improving  · Continue quarter normal saline/D5 at 75cc/hr per nephrology       History of AAA (abdominal aortic aneurysm) repair  Assessment & Plan  Hx of AAA s/p repair complicated by endoleak  Stable large abdominal aortic aneurysm measuring 9 7 x 10 2 cm with stent graft  U/S revealed AAA with endograft in place and no sign of endograft leak  Consider possible aortoenteral fistula though less likely  Vascular surgery consult  Consider CTA but given CKD will need to optimize first, nephro consult    HLD (hyperlipidemia)  Assessment & Plan  Continue atorvastatin    HTN (hypertension)  Assessment & Plan  Will hold home meds at this time as unclear what patient is taking  [facility]  Closed at this time   Will need to call and clarify which medications the patient has been filling and taking    Attention to nephrostomy [facility])  Assessment & Plan  On chart review: Patient had nephrostomy tube placed  for 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  Per records from [facility], patient did have bilateral nephrostomy tubes placed, however now presents with only L side tube in place  - Patient reports no difficulties with nephrostomy at this time, denies any hematuria  - If any issues with nephrostomy develop, would consider consulting Urology  - Monitor urine output    Chronic kidney disease  Assessment & Plan  On chart review, ESRD per records from [facility], with baseline Cr 1 7 to 1 8  Did state history of temporary dialysis during prior hospitalization, however was not on regular hemodialysis afterward  - Continue to monitor creatinine  Current creatinine 2 87 with BUN 69   - Nephrology consult  - IV fluids    History of prostate cancer  Assessment & Plan  Hx of metastatic prostate cancer without hx of chemotherapy  Per chart review, hx of spinal mets  On chart review: Patient had nephrostomy tube placed  for 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  Per records from [facility], patient did have bilateral nephrostomy tubes placed, however now presents with only L side tube in place  - Patient reports no difficulties with nephrostomy at this time, denies any hematuria  - If any issues with nephrostomy develop, would consider consulting Urology  - Monitor urine output      Disposition: Pending evaluation of anemia, continue inpatient care     SUBJECTIVE     Patient seen and examined  Patient reports severe back pain not improving with current pain regimen  He continues to have black bowel movements  Otherwise he denies nausea, vomiting or abdominal pain       OBJECTIVE     Vitals:    09/19/20 1531 09/19/20 2243 09/20/20 0537 09/20/20 0655   BP: (!) 119/46 (!) 120/48  136/61   Pulse: 64 76  81   Resp: 18 18  20   Temp: 99 1 °F (37 3 °C) 98 9 °F (37 2 °C)  99 °F (37 2 °C)   TempSrc:  Oral     SpO2: 93% 94%  93%   Weight:   57 7 kg (127 lb 3 3 oz)    Height:          Temperature:   Temp (24hrs), Av °F (37 2 °C), Min:98 9 °F (37 2 °C), Max:99 1 °F (37 3 °C)    Temperature: 99 °F (37 2 °C)  Intake & Output:  I/O        07 -  0700  07 -  0700  07 -  0700    P  O   120     I V  (mL/kg) 687 6 (11 7) 1712 3 (29 7)     Blood 1025      IV Piggyback 100      Total Intake(mL/kg) 1812 6 (30 8) 1832 3 (31 8)     Urine (mL/kg/hr) 1225 1925 (1 4)     Total Output 1225 1925     Net +587 6 -92 7                Weights:   IBW: 68 4 kg    Body mass index is 19 34 kg/m²  Weight (last 2 days)     Date/Time   Weight    20 0537   57 7 (127 21)    20 22:56:24   58 9 (129 85)    20 1548   59 (130)            Physical Exam:   GENERAL: NAD  HEENT:  NC/AT, MMM, no scleral icterus  CARDIAC:  RRR, no S3/S4 heard, loud holosystolic murmur present   PULMONARY:  CTA B/L, no wheezing/rales/rhonci, non-labored breathing  ABDOMEN:  Soft, NT/ND, +BS, no rebound/guarding/rigidity  Extremities:  2+ Pulses in DP/PT  No edema, cyanosis, or clubbing  Back: Tenderness to palpation in the lower spine, no paraspinal muscle spasms   NEUROLOGIC:  Alert/oriented x3  SKIN:  No rashes or erythema    LABORATORY DATA     Labs: I have personally reviewed pertinent reports  Results from last 7 days   Lab Units 20  0529 20  0001 20  1843  20  0603  20  1640   WBC Thousand/uL 9 94  --   --   --  7 54  --  7 60   HEMOGLOBIN g/dL 9 4* 8 6* 8 5*   < > 8 2*  8 3*   < > 5 7*   HEMATOCRIT % 30 7*  --   --   --  26 2*  26 4*  --  20 6*   PLATELETS Thousands/uL 237  --   --   --  196  --  273   NEUTROS PCT % 80*  --   --   --  75  --  79*   MONOS PCT % 7  --   --   --  8  --  8    < > = values in this interval not displayed        Results from last 7 days   Lab Units 20  0529 20  0603 20  1640   POTASSIUM mmol/L 4 4 4 5 5 0   CHLORIDE mmol/L 110* 112* 103   CO2 mmol/L 24 25 28   BUN mg/dL 45* 60* 69* CREATININE mg/dL 2 00* 2 08* 2 87*   CALCIUM mg/dL 9 9 9 6 10 0   ALK PHOS U/L  --   --  276*   ALT U/L  --   --  16   AST U/L  --   --  52*     Results from last 7 days   Lab Units 09/20/20  0529   MAGNESIUM mg/dL 2 5     Results from last 7 days   Lab Units 09/20/20  0529   PHOSPHORUS mg/dL 3 4              Results from last 7 days   Lab Units 09/19/20  0150 09/18/20  2352 09/18/20  1940   TROPONIN I ng/mL 0 05* 0 06* 0 05*       IMAGING & DIAGNOSTIC TESTING     Radiology Results: I have personally reviewed pertinent reports  Ct Chest Abdomen Pelvis Wo Contrast    Result Date: 9/18/2020  Impression: 1  Widespread osseous metastases again visualized  2   Stable large abdominal aortic aneurysm measuring 9 7 x 10 2 cm with stent graft  3   Stable prostatomegaly protruding into the bladder base  4   Stable right lung ground glass densities  Follow-up CT as previously recommended  5  Workstation performed: [id number]     Other Diagnostic Testing: I have personally reviewed pertinent reports      ACTIVE MEDICATIONS     Current Facility-Administered Medications   Medication Dose Route Frequency    acetaminophen (TYLENOL) tablet 975 mg  975 mg Oral Q8H [facility]    atorvastatin (LIPITOR) tablet 40 mg  40 mg Oral Daily    bicalutamide (CASODEX) tablet 50 mg  50 mg Oral Daily    bisacodyl (DULCOLAX) EC tablet 20 mg  20 mg Oral Once    dextrose 5 % and sodium chloride 0 2 % infusion  75 mL/hr Intravenous Continuous    lidocaine (LIDODERM) 5 % patch 1 patch  1 patch Topical Daily    ondansetron (ZOFRAN) injection 4 mg  4 mg Intravenous Q4H PRN    oxyCODONE (ROXICODONE) IR tablet 2 5 mg  2 5 mg Oral Q4H PRN    oxyCODONE (ROXICODONE) IR tablet 5 mg  5 mg Oral Q4H PRN    pantoprazole (PROTONIX) 80 mg in sodium chloride 0 9 % 100 mL infusion  8 mg/hr Intravenous Continuous    polyethylene glycol (GLYCOLAX) bowel prep 238 g  238 g Oral Once    senna-docusate sodium (SENOKOT S) 8 6-50 mg per tablet 1 tablet  1 tablet Oral BID       VTE Pharmacologic Prophylaxis: Reason for no pharmacologic prophylaxis Possible GI bleed  VTE Mechanical Prophylaxis: sequential compression device    Portions of the record may have been created with voice recognition software  Occasional wrong word or "sound a like" substitutions may have occurred due to the inherent limitations of voice recognition software    Read the chart carefully and recognize, using context, where substitutions have occurred   ==  Pedro Parada MD  [address]  Internal Medicine Residency PGY-3

## 2020-09-20 NOTE — PLAN OF CARE
Problem: Potential for Falls  Goal: Patient will remain free of falls  Description: INTERVENTIONS:  - Assess patient frequently for physical needs  -  Identify cognitive and physical deficits and behaviors that affect risk of falls    -  [place] fall precautions as indicated by assessment   - Educate patient/family on patient safety including physical limitations  - Instruct patient to call for assistance with activity based on assessment  - Modify environment to reduce risk of injury  - Consider OT/PT consult to assist with strengthening/mobility  Outcome: Progressing     Problem: PAIN - ADULT  Goal: Verbalizes/displays adequate comfort level or baseline comfort level  Description: Interventions:  - Encourage patient to monitor pain and request assistance  - Assess pain using appropriate pain scale  - Administer analgesics based on type and severity of pain and evaluate response  - Implement non-pharmacological measures as appropriate and evaluate response  - Consider cultural and social influences on pain and pain management  - Notify physician/advanced practitioner if interventions unsuccessful or patient reports new pain  Outcome: Progressing     Problem: INFECTION - ADULT  Goal: Absence or prevention of progression during hospitalization  Description: INTERVENTIONS:  - Assess and monitor for signs and symptoms of infection  - Monitor lab/diagnostic results  - Monitor all insertion sites, i e  indwelling lines, tubes, and drains  - Monitor endotracheal if appropriate and nasal secretions for changes in amount and color  - [place] appropriate cooling/warming therapies per order  - Administer medications as ordered  - Instruct and encourage patient and family to use good hand hygiene technique  - Identify and instruct in appropriate isolation precautions for identified infection/condition  Outcome: Progressing  Goal: Absence of fever/infection during neutropenic period  Description: INTERVENTIONS:  - Monitor WBC    Outcome: Progressing     Problem: SAFETY ADULT  Goal: Patient will remain free of falls  Description: INTERVENTIONS:  - Assess patient frequently for physical needs  -  Identify cognitive and physical deficits and behaviors that affect risk of falls    -  [place] fall precautions as indicated by assessment   - Educate patient/family on patient safety including physical limitations  - Instruct patient to call for assistance with activity based on assessment  - Modify environment to reduce risk of injury  - Consider OT/PT consult to assist with strengthening/mobility  Outcome: Progressing  Goal: Maintain or return to baseline ADL function  Description: INTERVENTIONS:  -  Assess patient's ability to carry out ADLs; assess patient's baseline for ADL function and identify physical deficits which impact ability to perform ADLs (bathing, care of mouth/teeth, toileting, grooming, dressing, etc )  - Assess/evaluate cause of self-care deficits   - Assess range of motion  - Assess patient's mobility; develop plan if impaired  - Assess patient's need for assistive devices and provide as appropriate  - Encourage maximum independence but intervene and supervise when necessary  - Involve family in performance of ADLs  - Assess for home care needs following discharge   - Consider OT consult to assist with ADL evaluation and planning for discharge  - Provide patient education as appropriate  Outcome: Progressing  Goal: Maintain or return mobility status to optimal level  Description: INTERVENTIONS:  - Assess patient's baseline mobility status (ambulation, transfers, stairs, etc )    - Identify cognitive and physical deficits and behaviors that affect mobility  - Identify mobility aids required to assist with transfers and/or ambulation (gait belt, sit-to-stand, lift, walker, cane, etc )  - [place] fall precautions as indicated by assessment  - Record patient progress and toleration of activity level on Mobility SBAR; progress patient to next Phase/Stage  - Instruct patient to call for assistance with activity based on assessment  - Consider rehabilitation consult to assist with strengthening/weightbearing, etc   Outcome: Progressing     Problem: DISCHARGE PLANNING  Goal: Discharge to home or other facility with appropriate resources  Description: INTERVENTIONS:  - Identify barriers to discharge w/patient and caregiver  - Arrange for needed discharge resources and transportation as appropriate  - Identify discharge learning needs (meds, wound care, etc )  - Arrange for interpretive services to assist at discharge as needed  - Refer to Case Management Department for coordinating discharge planning if the patient needs post-hospital services based on physician/advanced practitioner order or complex needs related to functional status, cognitive ability, or social support system  Outcome: Progressing     Problem: Knowledge Deficit  Goal: Patient/family/caregiver demonstrates understanding of disease process, treatment plan, medications, and discharge instructions  Description: Complete learning assessment and assess knowledge base  Interventions:  - Provide teaching at level of understanding  - Provide teaching via preferred learning methods  Outcome: Progressing     Problem: Nutrition/Hydration-ADULT  Goal: Nutrient/Hydration intake appropriate for improving, restoring or maintaining nutritional needs  Description: Monitor and assess patient's nutrition/hydration status for malnutrition  Collaborate with interdisciplinary team and initiate plan and interventions as ordered  Monitor patient's weight and dietary intake as ordered or per policy  Utilize nutrition screening tool and intervene as necessary  Determine patient's food preferences and provide high-protein, high-caloric foods as appropriate       INTERVENTIONS:  - Monitor oral intake, urinary output, labs, and treatment plans  - Assess nutrition and hydration status and recommend course of action  - Evaluate amount of meals eaten  - Assist patient with eating if necessary   - Allow adequate time for meals  - Recommend/ encourage appropriate diets, oral nutritional supplements, and vitamin/mineral supplements  - Order, calculate, and assess calorie counts as needed  - Recommend, monitor, and adjust tube feedings and TPN/PPN based on assessed needs  - Assess need for intravenous fluids  - Provide specific nutrition/hydration education as appropriate  - Include patient/family/caregiver in decisions related to nutrition  Outcome: Progressing     Problem: Prexisting or High Potential for Compromised Skin Integrity  Goal: Skin integrity is maintained or improved  Description: INTERVENTIONS:  - Identify patients at risk for skin breakdown  - Assess and monitor skin integrity  - Assess and monitor nutrition and hydration status  - Monitor labs   - Assess for incontinence   - Turn and reposition patient  - Assist with mobility/ambulation  - Relieve pressure over bony prominences  - Avoid friction and shearing  - Provide appropriate hygiene as needed including keeping skin clean and dry  - Evaluate need for skin moisturizer/barrier cream  - Collaborate with interdisciplinary team   - Patient/family teaching  - Consider wound care consult   Outcome: Progressing

## 2020-09-20 NOTE — UTILIZATION REVIEW
Initial Clinical Review    Admission: Date/Time/Statement:   Admission Orders (From admission, onward)     Ordered        09/18/20 2020  Inpatient Admission  Once                   Orders Placed This Encounter   Procedures    Inpatient Admission     Standing Status:   Standing     Number of Occurrences:   1     Order Specific Question:   Admitting Physician     Answer:   Hawa Moses     Order Specific Question:   Level of Care     Answer:   Med Surg [16]     Order Specific Question:   Estimated length of stay     Answer:   More than 2 Midnights     Order Specific Question:   Certification     Answer:   I certify that inpatient services are medically necessary for this patient for a duration of greater than two midnights  See H&P and MD Progress Notes for additional information about the patient's course of treatment  ED Arrival Information     Expected Arrival Acuity Means of Arrival Escorted By Service Admission Type    - 9/18/2020 15:35 Urgent Wheelchair Family Member Hospitalist Urgent    Arrival Complaint    weakness        Chief Complaint   Patient presents with    Weakness - Generalized     pt presents by hospital wheelchair with c/o lower back pain for "a long time, can't take the pain anymore " nephrostomy tube present  pt reports pain all over and generalized weakness  hx cancer      Assessment/Plan:  81 yo M with PMH significant for metastatic prostate cancer with spinal mets, nephrostomy tube placement, CKD, HTN, aortic stenosis, AAA s/p repair complicated by endoleak who presents to ED with c/o diffuse pain, weakness, loss of energy for past 2 wks, worsening over past 2 days  Admit to dark stools with last BM yesterday  In ED Hgb 5 7  CT abdomen and pelvis- stable large abdominal aortic aneurysm measuring 9 7 x 10 2 cm with stent graft  U/S revealed AAA with endograft in place and no sign of endograft leak  Trop 0 05  EKG showing NSR, no ST elevation/depression   BUN 69,  Creatinine 2 97 with baseline 1 7-1 8  Admit inpatient to M/S/Tele unit with symptomatic anemia, possible GI bleed  telem monitoring, H&H q6h  Transfuse for Hgb <7  Npo after MN  Protonix gtt  GI and vascular consults  Serial troponins  Continue home po meds  IVF's, monitor creatinine  Nephrology consult  Tranfused 2 U PRBC's      Vascular consult 9/19 -- Likelihood of aortoenteral fistula is low as patient will likely have more significant bleeding  Plan: Jus Sandhu formal abdominal u/s of the aortic aneurysm s/p EVAR  Can consider CTA if cleared by nephrology, will discuss with attending the need for it  Nephrology consult 9/19 -- YULIET most likely 2/2 prerenal azotemia plus some component of injury d/t severe anemia + increased susceptibility for acute kidney injury KI d/t recent episode of YULIET requiring dialysis in 2019  Change IVF's to 1/4NSS D5 @ 75 ml/hr while npo  Check BMP, Mag, phos in AM  Rental diet when allowed to eat  Avoid nephrotoxins  I/O's  Daily wts    GI consult 9/19 -- plan to repeat EGD/colonoscopy in AM   Prep to start tonight        ED Triage Vitals [09/18/20 1548]   Temperature Pulse Respirations Blood Pressure SpO2   (!) 94 7 °F (34 8 °C) 73 16 132/60 100 %      Temp Source Heart Rate Source Patient Position - Orthostatic VS BP Location FiO2 (%)   (S) Tympanic Monitor -- -- --      Pain Score       Worst Possible Pain          Wt Readings from Last 1 Encounters:   09/20/20 57 7 kg (127 lb 3 3 oz)     Additional Vital Signs:   Date/Time   Temp   Pulse   Resp   BP   MAP (mmHg)   SpO2   O2 Device    09/19/20 15:31:21   99 1 °F (37 3 °C)   64   18   119/46Abnormal     70   93 %       09/19/20 0655   97 9 °F (36 6 °C)   57   18   108/54      95 %       09/19/20 05:29:36   98 5 °F (36 9 °C)   68   20   125/48Abnormal     74   93 %       09/19/20 05:29:13   98 5 °F (36 9 °C)   66      125/48Abnormal     74   93 %       09/19/20 03:13:20   98 3 °F (36 8 °C)   60   20   121/47Abnormal     72   95 %    09/19/20 02:31:26   98 4 °F (36 9 °C)   63   20   123/47Abnormal     72   96 %       09/19/20 02:30:56   98 4 °F (36 9 °C)   62      123/47Abnormal     72   96 %       09/19/20 02:29:23      58            94 %       09/18/20 22:56:24   98 2 °F (36 8 °C)   64   20   125/47Abnormal     73   92 %       09/18/20 21:35:04   98 4 °F (36 9 °C)   63   20   126/47Abnormal     73   94 %       09/18/20 2051   97 6 °F (36 4 °C)   64   16   125/60             09/18/20 1915      62   16   113/56   81   94 %       09/18/20 1830   98 °F (36 7 °C)   64   16   116/57   82   93 %   None (Room air)    09/18/20 1821   97 9 °F (36 6 °C)   66   20   117/58   84   93 %   None (Room air)    09/18/20 1812   97 8 °F (36 6 °C)   67   18   115/58             09/18/20 1800      66   16   117/58   83   98 %   None (Room air)    09/18/20 1613   97 3 °F (36 3 °C)Abnormal                        09/18/20 1548   94 7 °F (34 8 °C)Abnormal     73   16   132/60      100 %           Pertinent Labs/Diagnostic Test Results:   EKG 9/18 -- NSR, no ST elevation/depression    CT c/a/p 9/18 -- 1   Widespread osseous metastases again visualized  2   Stable large abdominal aortic aneurysm measuring 9 7 x 10 2 cm with stent graft  3   Stable prostatomegaly protruding into the bladder base  4   Stable right lung ground glass densities   Follow-up CT as previously recommended         Results from last 7 days   Lab Units 09/19/20  1843 09/19/20  1229 09/19/20  0603 09/18/20  1640   WBC Thousand/uL  --   --  7 54 7 60   HEMOGLOBIN g/dL 8 5* 9 0* 8 2*  8 3* 5 7*   HEMATOCRIT %  --   --  26 2*  26 4* 20 6*   PLATELETS Thousands/uL  --   --  196 273   NEUTROS ABS Thousands/µL  --   --  5 64 5 98     Results from last 7 days   Lab Units 09/19/20  0603 09/18/20  1640   SODIUM mmol/L 144 139   POTASSIUM mmol/L 4 5 5 0   CHLORIDE mmol/L 112* 103   CO2 mmol/L 25 28   ANION GAP mmol/L 7 8   BUN mg/dL 60* 69*   CREATININE mg/dL 2 08* 2 87* EGFR ml/min/1 73sq m 28 19   CALCIUM mg/dL 9 6 10 0   CALCIUM, IONIZED mmol/L  --  1 23   MAGNESIUM mg/dL  --   --    PHOSPHORUS mg/dL  --   --      Results from last 7 days   Lab Units 09/18/20  1640   AST U/L 52*   ALT U/L 16   ALK PHOS U/L 276*   TOTAL PROTEIN g/dL 8 3*   ALBUMIN g/dL 3 1*   TOTAL BILIRUBIN mg/dL 0 26     Results from last 7 days   Lab Units 09/19/20  0603 09/18/20  1640   GLUCOSE RANDOM mg/dL 107 208*     Results from last 7 days   Lab Units 09/19/20  0150 09/18/20  2352 09/18/20  1940 09/18/20  1640   TROPONIN I ng/mL 0 05* 0 06* 0 05* 0 05*     ED Treatment:   Medication Administration from 09/18/2020 1534 to 09/18/2020 2108       Date/Time Order Dose Route Action     09/18/2020 1703 HYDROmorphone (DILAUDID) injection 0 5 mg 0 5 mg Intravenous Given     Past Medical History:   Diagnosis Date    AAA [facility]abdominal aortic aneurysm[facility])     s/p repair, now w/ aneurysm distal to repair    CKD (chronic kidney disease)     unknown baseline    Hyperlipidemia     Hypertension     [facility]     s/p nephrostomy tube, w/ bone mets    Pulmonary nodule     23mm RUL    Severe aortic stenosis      Present on Admission:   Symptomatic anemia   Chronic kidney disease   HTN (hypertension)   HLD (hyperlipidemia)   YULIET (acute kidney injury) ([facility] )      Admitting Diagnosis: Weakness [R53 1]  Anemia [D64 9]  Cancer associated pain [G89 3]  Weakness [R53 1]  Age/Sex: 80 y o  male  Admission Orders:  Scheduled Medications:  acetaminophen, 975 mg, Oral, Q8H [address]  atorvastatin, 40 mg, Oral, Daily  bicalutamide, 50 mg, Oral, Daily  bisacodyl, 20 mg, Oral, Once  [START ON 9/21/2020] bisacodyl, 20 mg, Oral, Once  lidocaine, 1 patch, Topical, Daily  polyethylene glycol, 4,000 mL, Oral, Once  senna-docusate sodium, 1 tablet, Oral, BID    Continuous IV Infusions:  multi-electrolyte, 75 mL/hr, Intravenous, Continuous  pantoprozole (PROTONIX) infusion (Continuous), 8 mg/hr, Intravenous, Continuous      PRN Meds:  HYDROmorphone, 0 5 mg, Intravenous, Q3H PRN  ondansetron, 4 mg, Intravenous, Q4H PRN  oxyCODONE, 10 mg, Oral, Q3H PRN  oxyCODONE, 5 mg, Oral, Q3H PRN 9/19 x4        IP CONSULT TO GASTROENTEROLOGY  IP CONSULT TO VASCULAR SURGERY  IP CONSULT TO NEPHROLOGY  IP CONSULT TO PALLIATIVE CARE    Network Utilization Review Department  [email or web address]  ATTENTION: Please call with any questions or concerns to [phone] and carefully listen to the prompts so that you are directed to the right person  All voicemails are confidential   Marshashi Shaper all requests for admission clinical reviews, approved or denied determinations and any other requests to dedicated fax number below belonging to the campus where the patient is receiving treatment   List of dedicated fax numbers for the Facilities:  [address] DENIALS (Administrative/Medical Necessity) [phone]   [address] (Maternity/NICU/Pediatrics) [phone]   Geovanny Orn [phone]   Carly Salazarise [phone]   Gustabo Flaco [phone]   Synetta Boots [phone]   1205 [facility] 1525 [facility] [phone]   [facility]  [phone]   2205 [facility] [address] [facility]  2401 [facility] [address] [facility] [phone]

## 2020-09-20 NOTE — PHYSICAL THERAPY NOTE
PHYSICAL THERAPY Evaluation NOTE    Patient Name: Raven Munoz  QTALT'G Date: 9/20/2020     AGE:   80 y o  Mrn:   [id number]  ADMIT DX:  Weakness [R53 1]  Anemia [D64 9]  Cancer associated pain [G89 3]  Weakness [R53 1]    Past Medical History:   Diagnosis Date    AAA (abdominal aortic aneurysm) ([facility])     s/p repair, now w/ aneurysm distal to repair    CKD (chronic kidney disease)     unknown baseline    Hyperlipidemia     Hypertension     Prostate CA ([address] )     s/p nephrostomy tube, w/ bone mets    Pulmonary nodule     23mm RUL    Severe aortic stenosis      Length Of Stay: 2  PHYSICAL THERAPY EVALUATION :    09/20/20 1003   PT Last Visit   PT Visit Date 09/20/20   Note Type   Note type Eval/Treat   Pain Assessment   Pain Assessment Tool 0-10   Pain Score Worst Possible Pain   Pain Location/Orientation Location: Back;Orientation: Lower;Orientation: Right   Home Living   Type of Home House  (1 SH, 0 VICKEY)   Home Layout One level   Bathroom Shower/Tub Walk-in shower   Bathroom Toilet Standard   Bathroom Accessibility Accessible   Home Equipment Cane   Additional Comments Pt  lives with daughter and Son in law   Prior Function   Level of [place] Needs assistance with IADLs; Needs assistance with ADLs and functional mobility   Lives With Family   Receives Help From Family   ADL Assistance Needs assistance   IADLs Needs assistance   Falls in the last 6 months 1 to 4   Vocational Retired   Restrictions/Precautions   Other Precautions Cognitive; Fall Risk;Pain   General   Additional Pertinent History Pt  is an 81 yo M who presents with symptomatic anemia   Family/Caregiver Present No   Cognition   Overall Cognitive Status WFL   Arousal/Participation Cooperative   Orientation Level Oriented X4   Memory Within functional limits   Following Commands Follows multistep commands with increased time or repetition   Comments Pt  was identified with full name and birthdate   RLE Assessment   RLE Assessment   (functionally > 3+/5)   LLE Assessment   LLE Assessment   (functionally > 3+/5)   Coordination   Movements are Fluid and Coordinated 0   Coordination and Movement Description antalgic functional mobility   Sensation WFL   Bed Mobility   Additional Comments Pt  seated OOB in chair prior to and following PT session   Transfers   Sit to Stand 5  Supervision   Additional items Assist x 1; Increased time required;Verbal cues  (for hand placement and sequencing)   Stand to Sit 5  Supervision   Additional items Assist x 1; Increased time required;Verbal cues   Ambulation/Elevation   Gait pattern Improper Weight shift;Narrow JAY; Antalgic; Forward Flexion;Decreased foot clearance; Inconsistent goldie; Redundant gait at times   Gait Assistance 4  Minimal assist   Additional items Assist x 1;Verbal cues  (for posture and gait mechanics)   Assistive Device Rolling walker   Distance 125'x1   Balance   Static Sitting Good   Dynamic Sitting Fair +   Static Standing Fair   Dynamic Standing Fair -   Ambulatory Poor +   Endurance Deficit   Endurance Deficit Yes   Endurance Deficit Description postural and gait degradation noted with fatigue   Activity Tolerance   Activity Tolerance Patient limited by fatigue;Patient limited by pain   Nurse Made Aware spoke to RN   Assessment   Prognosis Good   Problem List Decreased strength;Decreased endurance; Impaired balance;Decreased range of motion;Decreased mobility; Decreased coordination; Impaired judgement;Decreased cognition;Decreased safety awareness;Pain   Assessment Pt  is an 81 yo M who presents with symptomatic anemia   order placed for PT eval and tx, w/ activity order of up w/ A  pt presents w/ comorbidities of symptomatic anemia, hx of prostate cancer, Htn, HLD, Hx of AAA, YULIET, back pain, elevated troponin and personal factors of living in 2 story house, mobilizing w/ assistive device, inability to ambulate household distances, inability to navigate community distances, inability to navigate level surfaces w/o external assistance, unable to perform dynamic tasks in community, preferred language not English (language barrier), unable to perform physical activity, inability to perform IADLs, inability to perform ADLs and inability to live alone  pt presents w/ pain, weakness, decreased ROM, decreased endurance, impaired cognition, impaired balance, gait deviations, impaired coordination, decreased safety awareness, orthopedic restrictions, impaired judgment and fall risk  these impairments are evident in findings from physical examination (weakness, decreased ROM, impaired coordination, impaired skin integrity and edema of extremities), mobility assessment (need for Min assist w/ all phases of mobility when usually mobilizing independently, tolerance to only 125 feet of ambulation and need for cueing for mobility technique), and Barthel Index: 65/100  pt needed input for task focus and mobility technique  pt is at risk for falls due to physical and safety awareness deficits  pt's clinical presentation is unstable/unpredictable (evident in need for assist w/ all phases of mobility when usually mobilizing independently, tolerance to only 125 feet of ambulation, need for input for mobility technique, need for input for task focus and mobility technique and need for input for mobility technique/safety)  pt needs inpatient PT tx to improve mobility deficits  discharge recommendation is for home PT and home w/ family support to reduce fall risk and maximize level of functional independence  Goals   Patient Goals to get home   STG Expiration Date 09/30/20   Short Term Goal #1 pt will:  Increase bilateral LE strength 1/2 grade to facilitate independent mobility, Perform all bed mobility tasks w/ supervision to decrease fall risk factors, Perform all transfers modified independent to improve independence, Ambulate 250 ft  with roller walker w/ supervision w/o LOB, Navigate 2 stairs w/ supervision with unilateral handrail to facilitate return to previous living environment, Increase all balance 1/2 grade to decrease risk for falls and Improve Barthel Index score to 65 or greater to facilitate independence   PT Treatment Day 0   Plan   Treatment/Interventions Functional transfer training;LE strengthening/ROM; Therapeutic exercise; Endurance training;Cognitive reorientation;Equipment eval/education;Patient/family training;Bed mobility;Gait training;Spoke to nursing;Spoke to case management; Family   PT Frequency   (3-6x/wk)   Recommendation   PT Discharge Recommendation Home with skilled therapy  (HHPT and family support)   Equipment Recommended Walker   PT - OK to Discharge Yes   Additional Comments when medically appropraite   Barthel Index   Feeding 10   Bathing 0   Grooming Score 5   Dressing Score 5   Bladder Score 10   Bowels Score 10   Toilet Use Score 5   Transfers (Bed/Chair) Score 10   Mobility (Level Surface) Score 10   Stairs Score 0   Barthel Index Score 65     Skilled PT recommended while in hospital and upon DC to progress pt toward treatment goals       Edy Cotter, PT, DPT 9/20/2020

## 2020-09-20 NOTE — PROGRESS NOTES
Follow up Consultation    Nephrology   Myrna Vogel 80 y o  male MRN: [id number]  Unit/Bed#: [facility] 929-01 Encounter: [id number]      Physician Requesting Consult: No att  providers found        ASSESSMENT/PLAN:  80 y o   male with pmh of metastatic prostate cancer, hypertension, AAA status post repair complicated by endoleak, history of Josue I needing short dialysis in 2019 and left nephrostomy tube placement with CKD stage 3/4 who was admitted for multiple complaints including weakness back pain body aches  Nephrology has been consulted for evaluation and management of acute kidney injury       · Acute kidney injury (POA) on CKD stage 3/4:  - JOSUE most likely secondary to prerenal azotemia plus some component of injury due to severe anemia + increased susceptibility for acute kidney injury due to recent episode of acute kidney injury requiring dialysis in 2019  - After review of records it appears that the patient has a baseline Creatinine of 1 7-2 mg/dL  - patient was admitted with a creatinine of 2 87 mg/dL on 09/18/2020   - patient's creatinine today is at  2 00  mg/dL, stable and back to baseline   -change IV fluids to Plasmalyte at 75 cc an hour while patient remains NPO for EGD and  tomorrow  - check BMP, magnesium, phosphorus in a m   - had a detailed discussion with the patient with regards to his renal condition advised him all risks and benefits of undergoing CT with IV contrast from a renal perspective in order to evaluate his AAA repair due to risk of acute kidney injury secondary to PRACHI  Even advised patient small probability of needing renal replacement therapy due to him being moderate risk for Josue I  Would recommend holding off on CT with IV contrast until Josue I is resolved unless is emergent  If indeed is emergently then place patient on normal saline at 100 cc an hour for 5 hours pre and post procedure  - Await renal recovery    - CT chest abdomen pelvis from 09/18/2020 shows widespread osseous metastases  Stable large AAA with stent graft  Stable prostatomegaly radiating into the bladder  Stable right lung ground-glass densities  - Clinically patient is not uremic and there is no acute indication for renal replacement therapy (dialysis)  - Optimize hemodynamic status to avoid delay in renal recovery  - Place on a renal diet when allowed diet order    - Avoid nephrotoxins, adjust meds to appropriate GFR   - Strict I/O   - Daily weights  - Urinary retention protocol  - Most likely has underlying CKD secondary to prior episode of acute kidney injury dialysis requiring in 2019, some component of obstructive nephropathy  - will need to set up patient for follow up with Nephrology as an outpatient post hospitalization      · Blood pressure:  - home medications:  Norvasc 5 mg p o  Q day, Cardizem 120 mg p o  Q day  - current medications:  None  - recommendations:  None  - Optimize hemodynamics   - Maintain MAP > 65mmHg  - Avoid BP fluctuations      · H/H/anemia:  - most recent hemoglobin at  9 4 grams/deciliter  - maintain hemoglobin greater than 8 grams/deciliter  - admitted on 09/18/2020 with hemoglobin of 5 7 grams/deciliter  - status post PRBC transfusion  - iron studies showing iron 68 ferritin 78, T sat 33 %  - not a candidate for MARA due to malignancy     · Acid-base electrolytes:  ? Hypernatremia:    § Likely due to IV fluids  §  Most recent sodium at 139 mEq  § Change IV fluids to now to Plasmalyte     ? Hyperkalemia:  § Likely secondary to decreased distal delivery  § Follow low-potassium diet  § Most recent potassium improved down to 4 4 mEq        ?  Acid-base:    § Most recent bicarb improved down to 24     · Volume status:  ?  Clinically appears to be hypovolemic to hypovolemic    change IV fluids to Plasmalyte at 75 cc an hour while patient is NPO   · Other medical problems:     ? Bone mineral disease:   DC calcitriol  Most recent intact PTH of 41 7  ?  History of AAA repair with endoleak:  See recommendations above with regards to clearance for CT scan with IV contrast   ? History of obstructive nephropathy with left PCN in place: If any issues with drainage consult Urology  If any issues with decreased urine output recommend Dupree placement with Urology  ? History of metastatic prostate cancer: Follow-up with Heme-Onc      Thanks for the consult  Will continue to follow  Please call with questions/ concerns  Above-mentioned orders and Plan in terms of acute kidney injury was discussed with the team in [address] MD, FASN, 2020, 10:05 AM              Objective :   Patient seen and examined in his room no overnight events hemodynamically stable remains afebrile  Urine output close to 1 9 L last 24 hours  Plan for EGD tomorrow  PHYSICAL EXAM  /61   Pulse 81   Temp 99 °F (37 2 °C)   Resp 20   Ht 5' 8" (1 727 m)   Wt 57 7 kg (127 lb 3 3 oz)   SpO2 93%   BMI 19 34 kg/m²   Temp (24hrs), Av °F (37 2 °C), Min:98 9 °F (37 2 °C), Max:99 1 °F (37 3 °C)        Intake/Output Summary (Last 24 hours) at 2020 1005  Last data filed at 2020 0900  Gross per 24 hour   Intake 195 33 ml   Output 1475 ml   Net 477 33 ml       I/O last 24 hours: In:  3 [P O :240;  I V :1712 3]  Out:  [Urine:1925]      Current Weight: Weight - Scale: 57 7 kg (127 lb 3 3 oz)  First Weight: Weight - Scale: 59 kg (130 lb)  Physical Exam        Review of Systems    Scheduled Meds:  Current Facility-Administered Medications   Medication Dose Route Frequency Provider Last Rate    acetaminophen  975 mg Oral [facility] Betha Favorite, DO      atorvastatin  40 mg Oral Daily Betha Favorite, DO      bicalutamide  50 mg Oral Daily Betha Favorite, DO      bisacodyl  20 mg Oral Once Yonathan Weaver MD      dextrose 5 % and sodium chloride 0 2 %  75 mL/hr Intravenous Continuous Alejandro Sen MD 75 mL/hr (20 6524)    lidocaine  1 patch Topical Daily Brendan Molina Braden Mac MD      ondansetron  4 mg Intravenous Q4H PRN [place] Side, DO      oxyCODONE  2 5 mg Oral Q4H PRN [place] Side, DO      oxyCODONE  5 mg Oral Q4H PRN [place] Side, DO      pantoprozole (PROTONIX) infusion (Continuous)  8 mg/hr Intravenous Continuous [place] Side, DO 8 mg/hr (09/20/20 1003)    senna-docusate sodium  1 tablet Oral BID [place] Side, DO         PRN Meds: ondansetron    oxyCODONE    oxyCODONE    Continuous Infusions:dextrose 5 % and sodium chloride 0 2 %, 75 mL/hr, Last Rate: 75 mL/hr (09/20/20 0323)  pantoprozole (PROTONIX) infusion (Continuous), 8 mg/hr, Last Rate: 8 mg/hr (09/20/20 1003)          Invasive Devices: Invasive Devices     Peripheral Intravenous Line            Peripheral IV 09/18/20 Left Forearm 1 day    Peripheral IV 09/18/20 Left;Upper Arm 1 day          Drain            Nephrostomy Left -- days                  LABORATORY:    Results from last 7 days   Lab Units 09/20/20  0529 09/20/20  0001 09/19/20  1843 09/19/20  1229 09/19/20  0603 09/18/20  2352 09/18/20  1640   WBC Thousand/uL 9 94  --   --   --  7 54  --  7 60   HEMOGLOBIN g/dL 9 4* 8 6* 8 5* 9 0* 8 2*  8 3* 6 7* 5 7*   HEMATOCRIT % 30 7*  --   --   --  26 2*  26 4*  --  20 6*   PLATELETS Thousands/uL 237  --   --   --  196  --  273   POTASSIUM mmol/L 4 4  --   --   --  4 5  --  5 0   CHLORIDE mmol/L 110*  --   --   --  112*  --  103   CO2 mmol/L 24  --   --   --  25  --  28   BUN mg/dL 45*  --   --   --  60*  --  69*   CREATININE mg/dL 2 00*  --   --   --  2 08*  --  2 87*   CALCIUM mg/dL 9 9  --   --   --  9 6  --  10 0   MAGNESIUM mg/dL 2 5  --   --   --   --   --   --    PHOSPHORUS mg/dL 3 4  --   --   --   --   --   --       rest all reviewed    RADIOLOGY:  CT chest abdomen pelvis wo contrast   Final Result by Yanet Msoher MD (09/18 2010)   1  Widespread osseous metastases again visualized  2   Stable large abdominal aortic aneurysm measuring 9 7 x 10 2 cm with stent graft     3  Stable prostatomegaly protruding into the bladder base  4   Stable right lung ground glass densities  Follow-up CT as previously recommended  5                         Workstation performed: [id number]           Rest all reviewed    Portions of the record may have been created with voice recognition software  Occasional wrong word or "sound a like" substitutions may have occurred due to the inherent limitations of voice recognition software  Read the chart carefully and recognize, using context, where substitutions have occurred  If you have any questions, please contact the dictating provider

## 2020-09-20 NOTE — PLAN OF CARE
Problem: PHYSICAL THERAPY ADULT  Goal: Performs mobility at highest level of function for planned discharge setting  See evaluation for individualized goals  Description: Treatment/Interventions: Functional transfer training, LE strengthening/ROM, Therapeutic exercise, Endurance training, Cognitive reorientation, Equipment eval/education, Patient/family training, Bed mobility, Gait training, Spoke to nursing, Spoke to case management, Family  Equipment Recommended: Lilian Johnson       See flowsheet documentation for full assessment, interventions and recommendations  Note: Prognosis: Good  Problem List: Decreased strength, Decreased endurance, Impaired balance, Decreased range of motion, Decreased mobility, Decreased coordination, Impaired judgement, Decreased cognition, Decreased safety awareness, Pain  Assessment: Pt  is an 79 yo M who presents with symptomatic anemia  order placed for PT eval and tx, w/ activity order of up w/ A  pt presents w/ comorbidities of symptomatic anemia, hx of prostate cancer, Htn, HLD, Hx of AAA, YULIET, back pain, elevated troponin and personal factors of living in 2 story house, mobilizing w/ assistive device, inability to ambulate household distances, inability to navigate community distances, inability to navigate level surfaces w/o external assistance, unable to perform dynamic tasks in community, preferred language not English (language barrier), unable to perform physical activity, inability to perform IADLs, inability to perform ADLs and inability to live alone  pt presents w/ pain, weakness, decreased ROM, decreased endurance, impaired cognition, impaired balance, gait deviations, impaired coordination, decreased safety awareness, orthopedic restrictions, impaired judgment and fall risk   these impairments are evident in findings from physical examination (weakness, decreased ROM, impaired coordination, impaired skin integrity and edema of extremities), mobility assessment (need for Min assist w/ all phases of mobility when usually mobilizing independently, tolerance to only 125 feet of ambulation and need for cueing for mobility technique), and Barthel Index: 65/100  pt needed input for task focus and mobility technique  pt is at risk for falls due to physical and safety awareness deficits  pt's clinical presentation is unstable/unpredictable (evident in need for assist w/ all phases of mobility when usually mobilizing independently, tolerance to only 125 feet of ambulation, need for input for mobility technique, need for input for task focus and mobility technique and need for input for mobility technique/safety)  pt needs inpatient PT tx to improve mobility deficits  discharge recommendation is for home PT and home w/ family support to reduce fall risk and maximize level of functional independence  PT Discharge Recommendation: Home with skilled therapy(HHPT and family support)     PT - OK to Discharge: Yes    See flowsheet documentation for full assessment

## 2020-09-21 ENCOUNTER — ANESTHESIA (INPATIENT)
Dept: GASTROENTEROLOGY | Facility: HOSPITAL | Age: 85
DRG: 812 | End: 2020-09-21
Payer: MEDICARE

## 2020-09-21 ENCOUNTER — APPOINTMENT (INPATIENT)
Dept: GASTROENTEROLOGY | Facility: HOSPITAL | Age: 85
DRG: 812 | End: 2020-09-21
Payer: MEDICARE

## 2020-09-21 ENCOUNTER — ANESTHESIA EVENT (INPATIENT)
Dept: GASTROENTEROLOGY | Facility: HOSPITAL | Age: 85
DRG: 812 | End: 2020-09-21
Payer: MEDICARE

## 2020-09-21 VITALS — HEART RATE: 78 BPM

## 2020-09-21 LAB
ANION GAP SERPL CALCULATED.3IONS-SCNC: 5 MMOL/L (ref 4–13)
BACTERIA UR QL AUTO: ABNORMAL /HPF
BASOPHILS # BLD AUTO: 0.01 THOUSANDS/ΜL (ref 0–0.1)
BASOPHILS NFR BLD AUTO: 0 % (ref 0–1)
BILIRUB UR QL STRIP: NEGATIVE
BUN SERPL-MCNC: 39 MG/DL (ref 5–25)
CALCIUM SERPL-MCNC: 9.5 MG/DL (ref 8.3–10.1)
CHLORIDE SERPL-SCNC: 112 MMOL/L (ref 100–108)
CLARITY UR: ABNORMAL
CO2 SERPL-SCNC: 26 MMOL/L (ref 21–32)
COLOR UR: YELLOW
CREAT SERPL-MCNC: 1.75 MG/DL (ref 0.6–1.3)
EOSINOPHIL # BLD AUTO: 0.08 THOUSAND/ΜL (ref 0–0.61)
EOSINOPHIL NFR BLD AUTO: 1 % (ref 0–6)
ERYTHROCYTE [DISTWIDTH] IN BLOOD BY AUTOMATED COUNT: 21.2 % (ref 11.6–15.1)
GFR SERPL CREATININE-BSD FRML MDRD: 34 ML/MIN/1.73SQ M
GLUCOSE SERPL-MCNC: 97 MG/DL (ref 65–140)
GLUCOSE UR STRIP-MCNC: NEGATIVE MG/DL
HCT VFR BLD AUTO: 26 % (ref 36.5–49.3)
HGB BLD-MCNC: 8 G/DL (ref 12–17)
HGB BLD-MCNC: 8.1 G/DL (ref 12–17)
HGB UR QL STRIP.AUTO: ABNORMAL
HYALINE CASTS #/AREA URNS LPF: ABNORMAL /LPF
IMM GRANULOCYTES # BLD AUTO: 0.07 THOUSAND/UL (ref 0–0.2)
IMM GRANULOCYTES NFR BLD AUTO: 1 % (ref 0–2)
KETONES UR STRIP-MCNC: NEGATIVE MG/DL
LEUKOCYTE ESTERASE UR QL STRIP: ABNORMAL
LYMPHOCYTES # BLD AUTO: 0.64 THOUSANDS/ΜL (ref 0.6–4.47)
LYMPHOCYTES NFR BLD AUTO: 8 % (ref 14–44)
MCH RBC QN AUTO: 24.7 PG (ref 26.8–34.3)
MCHC RBC AUTO-ENTMCNC: 30.8 G/DL (ref 31.4–37.4)
MCV RBC AUTO: 80 FL (ref 82–98)
MONOCYTES # BLD AUTO: 0.56 THOUSAND/ΜL (ref 0.17–1.22)
MONOCYTES NFR BLD AUTO: 7 % (ref 4–12)
NEUTROPHILS # BLD AUTO: 6.64 THOUSANDS/ΜL (ref 1.85–7.62)
NEUTS SEG NFR BLD AUTO: 83 % (ref 43–75)
NITRITE UR QL STRIP: NEGATIVE
NON-SQ EPI CELLS URNS QL MICRO: ABNORMAL /HPF
NRBC BLD AUTO-RTO: 0 /100 WBCS
PH UR STRIP.AUTO: 6.5 [PH]
PLATELET # BLD AUTO: 189 THOUSANDS/UL (ref 149–390)
PMV BLD AUTO: 9.1 FL (ref 8.9–12.7)
POTASSIUM SERPL-SCNC: 3.9 MMOL/L (ref 3.5–5.3)
PROT UR STRIP-MCNC: ABNORMAL MG/DL
RBC # BLD AUTO: 3.24 MILLION/UL (ref 3.88–5.62)
RBC #/AREA URNS AUTO: ABNORMAL /HPF
SODIUM SERPL-SCNC: 143 MMOL/L (ref 136–145)
SP GR UR STRIP.AUTO: 1.01 (ref 1–1.03)
UROBILINOGEN UR QL STRIP.AUTO: 0.2 E.U./DL
WBC # BLD AUTO: 8 THOUSAND/UL (ref 4.31–10.16)
WBC #/AREA URNS AUTO: ABNORMAL /HPF

## 2020-09-21 PROCEDURE — 44361 SMALL BOWEL ENDOSCOPY/BIOPSY: CPT | Performed by: INTERNAL MEDICINE

## 2020-09-21 PROCEDURE — 85025 COMPLETE CBC W/AUTO DIFF WBC: CPT | Performed by: INTERNAL MEDICINE

## 2020-09-21 PROCEDURE — C9113 INJ PANTOPRAZOLE SODIUM, VIA: HCPCS | Performed by: INTERNAL MEDICINE

## 2020-09-21 PROCEDURE — 88305 TISSUE EXAM BY PATHOLOGIST: CPT | Performed by: PATHOLOGY

## 2020-09-21 PROCEDURE — 80048 BASIC METABOLIC PNL TOTAL CA: CPT | Performed by: INTERNAL MEDICINE

## 2020-09-21 PROCEDURE — 0DB68ZX EXCISION OF STOMACH, VIA NATURAL OR ARTIFICIAL OPENING ENDOSCOPIC, DIAGNOSTIC: ICD-10-PCS | Performed by: INTERNAL MEDICINE

## 2020-09-21 PROCEDURE — 81001 URINALYSIS AUTO W/SCOPE: CPT | Performed by: INTERNAL MEDICINE

## 2020-09-21 PROCEDURE — NC001 PR NO CHARGE: Performed by: INTERNAL MEDICINE

## 2020-09-21 PROCEDURE — 45330 DIAGNOSTIC SIGMOIDOSCOPY: CPT | Performed by: INTERNAL MEDICINE

## 2020-09-21 PROCEDURE — 99232 SBSQ HOSP IP/OBS MODERATE 35: CPT | Performed by: INTERNAL MEDICINE

## 2020-09-21 RX ORDER — PROPOFOL 10 MG/ML
INJECTION, EMULSION INTRAVENOUS AS NEEDED
Status: DISCONTINUED | OUTPATIENT
Start: 2020-09-21 | End: 2020-09-21

## 2020-09-21 RX ORDER — SODIUM CHLORIDE 9 MG/ML
INJECTION, SOLUTION INTRAVENOUS CONTINUOUS PRN
Status: DISCONTINUED | OUTPATIENT
Start: 2020-09-21 | End: 2020-09-21

## 2020-09-21 RX ADMIN — PHENYLEPHRINE HYDROCHLORIDE 30 MCG/MIN: 10 INJECTION INTRAVENOUS at 11:17

## 2020-09-21 RX ADMIN — ACETAMINOPHEN 975 MG: 325 TABLET, FILM COATED ORAL at 22:20

## 2020-09-21 RX ADMIN — PROPOFOL 20 MG: 10 INJECTION, EMULSION INTRAVENOUS at 11:36

## 2020-09-21 RX ADMIN — PROPOFOL 30 MG: 10 INJECTION, EMULSION INTRAVENOUS at 11:48

## 2020-09-21 RX ADMIN — ACETAMINOPHEN 975 MG: 325 TABLET, FILM COATED ORAL at 05:29

## 2020-09-21 RX ADMIN — PROPOFOL 20 MG: 10 INJECTION, EMULSION INTRAVENOUS at 11:32

## 2020-09-21 RX ADMIN — BISACODYL 20 MG: 5 TABLET, COATED ORAL at 05:29

## 2020-09-21 RX ADMIN — DOCUSATE SODIUM AND SENNOSIDES 1 TABLET: 8.6; 5 TABLET ORAL at 17:37

## 2020-09-21 RX ADMIN — PROPOFOL 30 MG: 10 INJECTION, EMULSION INTRAVENOUS at 11:42

## 2020-09-21 RX ADMIN — PROPOFOL 50 MG: 10 INJECTION, EMULSION INTRAVENOUS at 11:18

## 2020-09-21 RX ADMIN — ATORVASTATIN CALCIUM 40 MG: 40 TABLET, FILM COATED ORAL at 09:10

## 2020-09-21 RX ADMIN — PROPOFOL 10 MG: 10 INJECTION, EMULSION INTRAVENOUS at 11:21

## 2020-09-21 RX ADMIN — SODIUM CHLORIDE: 0.9 INJECTION, SOLUTION INTRAVENOUS at 11:04

## 2020-09-21 RX ADMIN — PROPOFOL 20 MG: 10 INJECTION, EMULSION INTRAVENOUS at 11:39

## 2020-09-21 RX ADMIN — PROPOFOL 20 MG: 10 INJECTION, EMULSION INTRAVENOUS at 11:27

## 2020-09-21 RX ADMIN — SODIUM CHLORIDE, SODIUM GLUCONATE, SODIUM ACETATE, POTASSIUM CHLORIDE, MAGNESIUM CHLORIDE, SODIUM PHOSPHATE, DIBASIC, AND POTASSIUM PHOSPHATE 75 ML/HR: .53; .5; .37; .037; .03; .012; .00082 INJECTION, SOLUTION INTRAVENOUS at 13:44

## 2020-09-21 RX ADMIN — PROPOFOL 10 MG: 10 INJECTION, EMULSION INTRAVENOUS at 11:25

## 2020-09-21 RX ADMIN — PROPOFOL 20 MG: 10 INJECTION, EMULSION INTRAVENOUS at 11:34

## 2020-09-21 RX ADMIN — PHENYLEPHRINE HYDROCHLORIDE 150 MCG: 10 INJECTION INTRAVENOUS at 11:51

## 2020-09-21 RX ADMIN — PROPOFOL 10 MG: 10 INJECTION, EMULSION INTRAVENOUS at 11:19

## 2020-09-21 RX ADMIN — SODIUM CHLORIDE 8 MG/HR: 9 INJECTION, SOLUTION INTRAVENOUS at 20:21

## 2020-09-21 RX ADMIN — PROPOFOL 20 MG: 10 INJECTION, EMULSION INTRAVENOUS at 11:50

## 2020-09-21 RX ADMIN — PROPOFOL 20 MG: 10 INJECTION, EMULSION INTRAVENOUS at 11:23

## 2020-09-21 RX ADMIN — PROPOFOL 20 MG: 10 INJECTION, EMULSION INTRAVENOUS at 11:46

## 2020-09-21 RX ADMIN — BICALUTAMIDE 50 MG: 50 TABLET, FILM COATED ORAL at 09:10

## 2020-09-21 RX ADMIN — ACETAMINOPHEN 975 MG: 325 TABLET, FILM COATED ORAL at 14:06

## 2020-09-21 RX ADMIN — LIDOCAINE 5% 1 PATCH: 700 PATCH TOPICAL at 09:12

## 2020-09-21 RX ADMIN — PROPOFOL 20 MG: 10 INJECTION, EMULSION INTRAVENOUS at 11:29

## 2020-09-21 NOTE — PROGRESS NOTES
Patient has only been able to tolerate approximately 100cc of his GoLYTELY bowel prep  I reinforced the importance of competing this medication  Not tolerated by patient, patient stated, "It makes my stomach hurt " Patient has had approximately 4-5 bowel movements within 24 hours  Stool is not clear at this point

## 2020-09-21 NOTE — PROGRESS NOTES
INTERNAL MEDICINE RESIDENCY PROGRESS NOTE     Name: Raven Munoz   Age & Sex: 80 y o  male   MRN: [id number]  Unit/Bed#: [facility] 929-01   Encounter: [id number]  Team: SOD Team B     PATIENT INFORMATION     Name: Raven Munoz   Age & Sex: 80 y o  male   MRN: [address] Stay Days: 3    ASSESSMENT/PLAN     Principal Problem:    Symptomatic anemia  Active Problems:    History of AAA (abdominal aortic aneurysm) repair    History of prostate cancer    Chronic kidney disease    Attention to nephrostomy (HCC)    HTN (hypertension)    YULIET (acute kidney injury) ([facility] [address] )    HLD (hyperlipidemia)    Back pain    Elevated troponin      * Symptomatic anemia  Assessment & Plan  · Patient anemic on presentation with Hemoglobin 5 7  · Status post 3 U PRBC transfusion   · Weakness and loss of energy for two weeks but worsening over the past two days  · Endorses dark stool, no jam blood; On presentation, the patient was hemodynamically stable in no acute distress  · CT abdomen and pelvis- stable large abdominal aortic aneurysm measuring 9 7 x 10 2 cm with stent graft  · U/S revealed AAA with endograft in place and no sign of endograft leak      · (Of note, recent hospitalization in mid- August for anemia of hgb 5 1 with undetermined cause (endoscopy showing sliding hiatal hernia and no signs of bleeding, colonoscopy with poor preparation and recommendation for repeat colonoscopy which has not been performed since; SPEP negative for monoclonal bands;  Heyde's syndrome and von willebrand disease were also ruled out; hgb 7 8 at discharge at the time)    Plan:  · Monitor Hemoglobin Q8H  · On protonix infusion   · Patient was supposed to be given 2 day prep with golytely however he has not drank much of it yet, will change to glycolax with Gatorade so he might be able to tolerate the taste better  · Continues to have melena   · Plan for EGD and colonoscopy today   · Continue to monitor stools     History of AAA (abdominal aortic aneurysm) repair  Assessment & Plan  Hx of AAA s/p repair complicated by endoleak  Stable large abdominal aortic aneurysm measuring 9 7 x 10 2 cm with stent graft  U/S revealed AAA with endograft in place and no sign of endograft leak  Consider possible aortoenteral fistula though less likely  Vascular surgery consult  Consider CTA but given CKD will need to optimize first, nephro consult    History of prostate cancer  Assessment & Plan  Hx of metastatic prostate cancer without hx of chemotherapy  Per chart review, hx of spinal mets  On chart review: Patient had nephrostomy tube placed  for 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  Per records from [facility], patient did have bilateral nephrostomy tubes placed, however now presents with only L side tube in place  - Patient reports no difficulties with nephrostomy at this time, denies any hematuria  - If any issues with nephrostomy develop, would consider consulting Urology  - Monitor urine output    Chronic kidney disease  Assessment & Plan  On chart review, ESRD per records from [facility], with baseline Cr 1 7 to 1 8  Did state history of temporary dialysis during prior hospitalization, however was not on regular hemodialysis afterward  - Continue to monitor creatinine  Current creatinine 2 87 with BUN 69   - Nephrology consult  - IV fluids    Attention to nephrostomy [facility])  Assessment & Plan  On chart review: Patient had nephrostomy tube placed  for 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  Per records from [facility], patient did have bilateral nephrostomy tubes placed, however now presents with only L side tube in place  - Patient reports no difficulties with nephrostomy at this time, denies any hematuria    - If any issues with nephrostomy develop, would consider consulting Urology  - Monitor urine output    HTN (hypertension)  Assessment & Plan  Will hold home meds at this time as unclear what patient is taking  Patient's pharmacy: [facility]  Closed at this time  Will need to call and clarify which medications the patient has been filling and taking    YULIET (acute kidney injury) [facility])  Assessment & Plan  · On chart review, ESRD per records from [facility], with baseline Cr 1 7 to 1 8  Did state history of temporary dialysis during prior hospitalization, however was not on regular hemodialysis afterward  · Creatinine improving  · Continue quarter normal saline/D5 at 75cc/hr per nephrology       HLD (hyperlipidemia)  Assessment & Plan  Continue atorvastatin    Back pain  Assessment & Plan  · History of metastatic prostate cancer without hx of chemotherapy, hx of spinal mets per chart review  · Hx of AAA s/p repair complicated by endoleak  · Stable large abdominal aortic aneurysm measuring 9 7 x 10 2 cm with stent graft  · U/S revealed AAA with endograft in place and no sign of endograft leak  · Pain regimen  · Scheduled tylenol  · Lidocaine patch   · roxicodone 2 5mg q4 hours PRN moderate pain  · roxicodone 5mg q4 hours PRN severe pain  · Continues to have pain, will consult palliative care     Elevated troponin  Assessment & Plan  · Troponin 0 05 on admission  · In the setting of CKD, no ACS symptoms       Disposition:  Continue inpatient level of care for EGD and colonoscopy today; PT/OT recommends home with family support when medically optimized     SUBJECTIVE     Patient seen and examined at bedside  No acute events overnight  Denies chest pain, SOB, diaphoresis, nausea/vomiting/diarrhea, fevers/chills       OBJECTIVE     Vitals:    09/20/20 1518 09/20/20 2352 09/21/20 0546 09/21/20 0709   BP: (!) 119/48 (!) 117/49  118/57   Pulse: 68 70  98   Resp: 18 18  19   Temp: 98 °F (36 7 °C) 99 1 °F (37 3 °C)  97 8 °F (36 6 °C)   TempSrc:       SpO2: 92% 94%  95%   Weight:   56 8 kg (125 lb 3 5 oz)    Height: Temperature:   Temp (24hrs), Av 3 °F (36 8 °C), Min:97 8 °F (36 6 °C), Max:99 1 °F (37 3 °C)    Temperature: 97 8 °F (36 6 °C)  Intake & Output:  I/O        07 -  0700  07 -  0700  07 -  0700    P  O  120 1920     I V  (mL/kg) 1712 3 (29 7)  1 (35 4)     Blood       IV Piggyback       Total Intake(mL/kg) 1832 3 (31 8) 3930 1 (69 2)     Urine (mL/kg/hr) 1925 (1 4) 775 (0 6)     Stool  0     Total Output  775     Net -92 7 +3155 1            Unmeasured Urine Occurrence  4 x     Unmeasured Stool Occurrence  4 x         Weights:   IBW: 68 4 kg    Body mass index is 19 04 kg/m²  Weight (last 2 days)     Date/Time   Weight    20 0546   56 8 (125 22)    20 0537   57 7 (127 21)            Physical Exam  Constitutional:       General: He is not in acute distress  Appearance: He is well-developed  He is not diaphoretic  HENT:      Head: Normocephalic and atraumatic  Nose: Nose normal       Mouth/Throat:      Pharynx: No oropharyngeal exudate  Eyes:      General: No scleral icterus  Right eye: No discharge  Left eye: No discharge  Conjunctiva/sclera: Conjunctivae normal    Neck:      Musculoskeletal: Neck supple  Vascular: No JVD  Cardiovascular:      Rate and Rhythm: Normal rate and regular rhythm  Heart sounds: Normal heart sounds  No murmur  No friction rub  No gallop  Pulmonary:      Effort: Pulmonary effort is normal  No respiratory distress  Breath sounds: Normal breath sounds  No wheezing or rales  Abdominal:      General: Bowel sounds are normal  There is no distension  Palpations: Abdomen is soft  Tenderness: There is no abdominal tenderness  There is no guarding or rebound  Musculoskeletal: Normal range of motion  Skin:     General: Skin is warm and dry  Findings: No erythema  Neurological:      Mental Status: He is alert and oriented to person, place, and time         LABORATORY DATA     Labs: I have personally reviewed pertinent reports  Results from last 7 days   Lab Units 09/21/20  0542 09/20/20  2353 09/20/20  1627 09/20/20  0529  09/19/20  0603   WBC Thousand/uL 8 00  --   --  9 94  --  7 54   HEMOGLOBIN g/dL 8 0* 8 1* 8 8* 9 4*   < > 8 2*  8 3*   HEMATOCRIT % 26 0*  --   --  30 7*  --  26 2*  26 4*   PLATELETS Thousands/uL 189  --   --  237  --  196   NEUTROS PCT % 83*  --   --  80*  --  75   MONOS PCT % 7  --   --  7  --  8    < > = values in this interval not displayed  Results from last 7 days   Lab Units 09/21/20  0542 09/20/20  0529 09/19/20  0603 09/18/20  1640   POTASSIUM mmol/L 3 9 4 4 4 5 5 0   CHLORIDE mmol/L 112* 110* 112* 103   CO2 mmol/L 26 24 25 28   BUN mg/dL 39* 45* 60* 69*   CREATININE mg/dL 1 75* 2 00* 2 08* 2 87*   CALCIUM mg/dL 9 5 9 9 9 6 10 0   ALK PHOS U/L  --   --   --  276*   ALT U/L  --   --   --  16   AST U/L  --   --   --  52*     Results from last 7 days   Lab Units 09/20/20  0529   MAGNESIUM mg/dL 2 5     Results from last 7 days   Lab Units 09/20/20  0529   PHOSPHORUS mg/dL 3 4              Results from last 7 days   Lab Units 09/19/20  0150 09/18/20  2352 09/18/20  1940   TROPONIN I ng/mL 0 05* 0 06* 0 05*       IMAGING & DIAGNOSTIC TESTING     Radiology Results: I have personally reviewed pertinent reports  Ct Chest Abdomen Pelvis Wo Contrast    Result Date: 9/18/2020  Impression: 1  Widespread osseous metastases again visualized  2   Stable large abdominal aortic aneurysm measuring 9 7 x 10 2 cm with stent graft  3   Stable prostatomegaly protruding into the bladder base  4   Stable right lung ground glass densities  Follow-up CT as previously recommended  5  Workstation performed: [id number]     Other Diagnostic Testing: I have personally reviewed pertinent reports      ACTIVE MEDICATIONS     Current Facility-Administered Medications   Medication Dose Route Frequency    acetaminophen (TYLENOL) tablet 975 mg  975 mg Oral Q8H [address]    atorvastatin (LIPITOR) tablet 40 mg  40 mg Oral Daily    bicalutamide (CASODEX) tablet 50 mg  50 mg Oral Daily    HYDROmorphone (DILAUDID) injection 0 5 mg  0 5 mg Intravenous Q3H PRN    lidocaine (LIDODERM) 5 % patch 1 patch  1 patch Topical Daily    multi-electrolyte (PLASMALYTE-A/ISOLYTE-S PH 7 4) IV solution  75 mL/hr Intravenous Continuous    ondansetron (ZOFRAN) injection 4 mg  4 mg Intravenous Q4H PRN    oxyCODONE (ROXICODONE) immediate release tablet 10 mg  10 mg Oral Q3H PRN    oxyCODONE (ROXICODONE) IR tablet 5 mg  5 mg Oral Q3H PRN    pantoprazole (PROTONIX) 80 mg in sodium chloride 0 9 % 100 mL infusion  8 mg/hr Intravenous Continuous    senna-docusate sodium (SENOKOT S) 8 6-50 mg per tablet 1 tablet  1 tablet Oral BID       VTE Pharmacologic Prophylaxis: Reason for no pharmacologic prophylaxis GIB  VTE Mechanical Prophylaxis: sequential compression device    Portions of the record may have been created with voice recognition software  Occasional wrong word or "sound a like" substitutions may have occurred due to the inherent limitations of voice recognition software    Read the chart carefully and recognize, using context, where substitutions have occurred   ==  Patrikc Chaudhry DO  [address]  Internal Medicine Residency PGY-3

## 2020-09-21 NOTE — ANESTHESIA PREPROCEDURE EVALUATION
Procedure:  EGD  COLONOSCOPY    Relevant Problems   CARDIO   (+) HLD (hyperlipidemia)   (+) HTN (hypertension)   (+) Murmur, cardiac   (+) Severe aortic stenosis      GI/HEPATIC   (+) GI bleeding      /RENAL   (+) YULIET (acute kidney injury) (HCC)   (+) Chronic kidney disease   (+) End stage renal disease (HCC)      HEMATOLOGY   (+) Symptomatic anemia      MUSCULOSKELETAL   (+) Back pain      NEURO/PSYCH   (+) History of prostate cancer      PULMONARY   (+) Shortness of breath        Physical Exam    Airway       Dental       Cardiovascular  Rhythm: regular, Rate: normal, Murmur,     Pulmonary  Breath sounds clear to auscultation,     Other Findings        Anesthesia Plan  ASA Score- 4     Anesthesia Type- IV sedation with anesthesia with ASA Monitors  Additional Monitors:   Airway Plan:     Comment: Plan for IV Sedation with GA backup  All risks/benefits and alternatives were discussed with the patient including the possibility of escalation of care in terms of anesthesia to GA and the possibility of rare anesthetic and surgical emergencies  Patient agreed and had no further questions prior to procedure  Mayte Villanueva MD, have personally seen and evaluated the patient prior to anesthetic care  I have reviewed the pre-anesthetic record, and other medical records if appropriate to the anesthetic care  If a CRNA is involved in the case, I have reviewed the CRNA assessment, if present, and agree          Plan Factors-Exercise tolerance (METS): <4 METS  Chart reviewed  EKG reviewed  Existing labs reviewed  Patient summary reviewed  Induction- intravenous  Postoperative Plan-     Informed Consent- Anesthetic plan and risks discussed with patient  I personally reviewed this patient with the CRNA  Discussed and agreed on the Anesthesia Plan with the CRNA  Saw Feliz

## 2020-09-21 NOTE — OCCUPATIONAL THERAPY NOTE
Occupational Therapy         Patient Name: Mariluz Cerda  WNTDZ'A Date: 9/21/2020    OT orders received and chart reviewed - pt off floor for EGD/colonoscopy - will defer and address OT needs post procedure    Iglesia Layton OT

## 2020-09-21 NOTE — PROGRESS NOTES
NEPHROLOGY PROGRESS NOTE   mAy Bullock 80 y o  male MRN: [id number]  Unit/Bed#: [facility] 929-01 Encounter: [id number]    ASSESSMENT & PLAN:  80 y o   male with pmh of metastatic prostate cancer, hypertension, AAA status post repair complicated by endoleak, history of Josue  needing short dialysis in 2019 and left nephrostomy tube placement with CKD stage 3/4, MGUS, severe aortic stenosis  who was admitted for multiple complaints including weakness back pain body aches  Nephrology has been consulted for evaluation and management of acute kidney injury   Was found to have admission hemoglobin 5 7 and received PRBC  Acute kidney injury, POA on 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  -baseline creatinine 1 7-2 0  -admission creatinine on 09/18 was 2 8  -acute kidney injury likely prerenal azotemia in the setting of severe anemia with admission hemoglobin 5 7  Chronic kidney disease likely due to a prior episode of acute kidney injury with incomplete recovery, history of acute kidney injury in 2019 requiring dialysis  also has some component of obstructive uropathy and is status post left PCN  -CT abdomen pelvis from 09/18 was suggestive of widespread osseous metastasis, stable AAA with stent graft, stable prostatomegaly     -check UA with microscopy  -function improving to creatinine 1 75 today, continue monitoring  STOPPED IVF 9/21    Primary hypertension with chronic kidney disease  -blood pressure acceptable, avoid hypotension  -currently not on any antihypertensive medications  At home on amlodipine and Cardizem    Anemia: hb stable  At 8 0   -admission hb 5 7 with iron sat 33 %  -not a candidate for MARA due to malignancy  -s/p PRBC  -had EGD and colonoscopy on 9/21    CKD/MBD:   -was taken off calcitriol as PTH was 41 7  monitor PTH as outpatient  -last phosphorus wnl at 3 4    H/o obstructive uropathy with left PCN in place: recommend outpatient urology follow up   PCN draining well so far    -as per records from [facility], patient used to have bilateral PCN in the past   Right PCN fell off  He was seen at the hospital in March 2020 and left PCN was exchanged  Right PCN was not placed at that time  -on recent CT abdomen previously seen right hydronephrosis as decreased  Left nephrostomy tube in place   -would need outpatient follow up with Urology    Metastatic prostate carcinoma: follow up with hem onc  History of AAA repair with endoleak:  Continue to follow primary team recommendations  CT scan was done without contrast, showed stable large AAA 9 7 x 10 2 cm with stent  Hypernatremia/hyperkalemia: resolved    Will discuss with primary team      SUBJECTIVE:  No new complaints  No SOB  No chest pain     OBJECTIVE:  Current Weight: Weight - Scale: 56 8 kg (125 lb 3 5 oz)  Vitals:    09/21/20 1456   BP: (!) 113/46   Pulse: 69   Resp: 16   Temp: 98 2 °F (36 8 °C)   SpO2: 95%       Intake/Output Summary (Last 24 hours) at 9/21/2020 1503  Last data filed at 9/21/2020 1300  Gross per 24 hour   Intake 1960 17 ml   Output 825 ml   Net 1135 17 ml       Physical Exam  General:  Ill looking, awake    Eyes: Conjunctivae pink,  Sclera anicteric  ENT: lips and mucous membranes moist  Neck: supple   Chest: Clear to Auscultation both lungs,  Minimal crackles left lung base   CVS: S1 & S2 present, normal rate, regular rhythm, ESM+   Abdomen: soft, non-tender, non-distended, Bowel sounds normoactive, has left PCN   Extremities: no edema of  legs  Skin: no rash  Neuro: awake, alert, oriented x 3       Medications:    Current Facility-Administered Medications:     acetaminophen (TYLENOL) tablet 975 mg, 975 mg, Oral, Q8H [address], Sherel [place], DO, 975 mg at 09/21/20 1406    atorvastatin (LIPITOR) tablet 40 mg, 40 mg, Oral, Daily, Sherel Emerald, DO, 40 mg at 09/21/20 0910    bicalutamide (CASODEX) tablet 50 mg, 50 mg, Oral, Daily, Sherel Emerald, DO, 50 mg at 09/21/20 0910    HYDROmorphone (DILAUDID) injection 0 5 mg, 0 5 mg, Intravenous, Q3H PRN, Felicity Barnett MD    lidocaine (LIDODERM) 5 % patch 1 patch, 1 patch, Topical, Daily, Terell Dalal MD, 1 patch at 09/21/20 0912    multi-electrolyte (PLASMALYTE-A/ISOLYTE-S PH 7 4) IV solution, 75 mL/hr, Intravenous, Continuous, Miguelito Ramirez MD, Last Rate: 75 mL/hr at 09/21/20 1344, 75 mL/hr at 09/21/20 1344    ondansetron (ZOFRAN) injection 4 mg, 4 mg, Intravenous, Q4H PRN, Fili Currie DO    oxyCODONE (ROXICODONE) immediate release tablet 10 mg, 10 mg, Oral, Q3H PRN, Felicity Barnett MD    oxyCODONE (ROXICODONE) IR tablet 5 mg, 5 mg, Oral, Q3H PRN, Felicity Barnett MD    [COMPLETED] pantoprazole (PROTONIX) 80 mg in sodium chloride 0 9 % 100 mL IVPB, 80 mg, Intravenous, Once, Stopped at 09/19/20 0138 **AND** pantoprazole (PROTONIX) 80 mg in sodium chloride 0 9 % 100 mL infusion, 8 mg/hr, Intravenous, Continuous, Fili Butter, DO, Last Rate: 10 mL/hr at 09/21/20 1104, 8 mg/hr at 09/21/20 1104    senna-docusate sodium (SENOKOT S) 8 6-50 mg per tablet 1 tablet, 1 tablet, Oral, BID, Fili Butter, DO, 1 tablet at 09/20/20 1746    Invasive Devices:        Lab Results:   Results from last 7 days   Lab Units 09/21/20  0542 09/20/20  2353 09/20/20  1627 09/20/20  0529  09/19/20  0603  09/18/20  1640   WBC Thousand/uL 8 00  --   --  9 94  --  7 54  --  7 60   HEMOGLOBIN g/dL 8 0* 8 1* 8 8* 9 4*   < > 8 2*  8 3*   < > 5 7*   HEMATOCRIT % 26 0*  --   --  30 7*  --  26 2*  26 4*  --  20 6*   PLATELETS Thousands/uL 189  --   --  237  --  196  --  273   POTASSIUM mmol/L 3 9  --   --  4 4  --  4 5  --  5 0   CHLORIDE mmol/L 112*  --   --  110*  --  112*  --  103   CO2 mmol/L 26  --   --  24  --  25  --  28   BUN mg/dL 39*  --   --  45*  --  60*  --  69*   CREATININE mg/dL 1 75*  --   --  2 00*  --  2 08*  --  2 87*   CALCIUM mg/dL 9 5  --   --  9 9  --  9 6  --  10 0   MAGNESIUM mg/dL  --   --   --  2 5  --   --   --   --    PHOSPHORUS mg/dL --   --   --  3 4  --   --   --   --    ALK PHOS U/L  --   --   --   --   --   --   --  276*   ALT U/L  --   --   --   --   --   --   --  16   AST U/L  --   --   --   --   --   --   --  52*    < > = values in this interval not displayed  Previous work up:  CT abd pelvis without contrast   IMPRESSION:  1  Widespread osseous metastases again visualized  2   Stable large abdominal aortic aneurysm measuring 9 7 x 10 2 cm with stent graft  3   Stable prostatomegaly protruding into the bladder base  4   Stable right lung ground glass densities  Follow-up CT as previously recommended    Portions of the record may have been created with voice recognition software  Occasional wrong word or "sound a like" substitutions may have occurred due to the inherent limitations of voice recognition software  Read the chart carefully and recognize, using context, where substitutions have occurred  If you have any questions, please contact the dictating provider

## 2020-09-21 NOTE — ANESTHESIA POSTPROCEDURE EVALUATION
Post-Op Assessment Note    CV Status:  Stable  Pain Score: 0    Pain management: adequate     Mental Status:  Arousable   Hydration Status:  Stable   PONV Controlled:  None   Airway Patency:  Patent      Post Op Vitals Reviewed: Yes      Staff: Anesthesiologist, CRNA         No complications documented      BP 95/52 (09/21/20 1201)    Temp      Pulse 80 (09/21/20 1201)   Resp 16 (09/21/20 1201)    SpO2 100 % (09/21/20 1201)

## 2020-09-22 PROBLEM — G89.3 CANCER-RELATED PAIN: Chronic | Status: ACTIVE | Noted: 2020-09-22

## 2020-09-22 LAB
ANION GAP SERPL CALCULATED.3IONS-SCNC: 6 MMOL/L (ref 4–13)
BASOPHILS # BLD AUTO: 0.01 THOUSANDS/ΜL (ref 0–0.1)
BASOPHILS NFR BLD AUTO: 0 % (ref 0–1)
BUN SERPL-MCNC: 33 MG/DL (ref 5–25)
CALCIUM SERPL-MCNC: 9.3 MG/DL (ref 8.3–10.1)
CHLORIDE SERPL-SCNC: 114 MMOL/L (ref 100–108)
CO2 SERPL-SCNC: 24 MMOL/L (ref 21–32)
CREAT SERPL-MCNC: 1.68 MG/DL (ref 0.6–1.3)
EOSINOPHIL # BLD AUTO: 0.27 THOUSAND/ΜL (ref 0–0.61)
EOSINOPHIL NFR BLD AUTO: 4 % (ref 0–6)
ERYTHROCYTE [DISTWIDTH] IN BLOOD BY AUTOMATED COUNT: 21.7 % (ref 11.6–15.1)
GFR SERPL CREATININE-BSD FRML MDRD: 36 ML/MIN/1.73SQ M
GLUCOSE SERPL-MCNC: 60 MG/DL (ref 65–140)
GLUCOSE SERPL-MCNC: 70 MG/DL (ref 65–140)
GLUCOSE SERPL-MCNC: 94 MG/DL (ref 65–140)
HCT VFR BLD AUTO: 25.4 % (ref 36.5–49.3)
HGB BLD-MCNC: 7.7 G/DL (ref 12–17)
IMM GRANULOCYTES # BLD AUTO: 0.05 THOUSAND/UL (ref 0–0.2)
IMM GRANULOCYTES NFR BLD AUTO: 1 % (ref 0–2)
LYMPHOCYTES # BLD AUTO: 0.65 THOUSANDS/ΜL (ref 0.6–4.47)
LYMPHOCYTES NFR BLD AUTO: 10 % (ref 14–44)
MAGNESIUM SERPL-MCNC: 2.5 MG/DL (ref 1.6–2.6)
MCH RBC QN AUTO: 24.9 PG (ref 26.8–34.3)
MCHC RBC AUTO-ENTMCNC: 30.3 G/DL (ref 31.4–37.4)
MCV RBC AUTO: 82 FL (ref 82–98)
MONOCYTES # BLD AUTO: 0.46 THOUSAND/ΜL (ref 0.17–1.22)
MONOCYTES NFR BLD AUTO: 7 % (ref 4–12)
NEUTROPHILS # BLD AUTO: 5.03 THOUSANDS/ΜL (ref 1.85–7.62)
NEUTS SEG NFR BLD AUTO: 78 % (ref 43–75)
NRBC BLD AUTO-RTO: 0 /100 WBCS
PHOSPHATE SERPL-MCNC: 3.3 MG/DL (ref 2.3–4.1)
PLATELET # BLD AUTO: 194 THOUSANDS/UL (ref 149–390)
PMV BLD AUTO: 9.2 FL (ref 8.9–12.7)
POTASSIUM SERPL-SCNC: 4.2 MMOL/L (ref 3.5–5.3)
RBC # BLD AUTO: 3.09 MILLION/UL (ref 3.88–5.62)
SODIUM SERPL-SCNC: 144 MMOL/L (ref 136–145)
WBC # BLD AUTO: 6.47 THOUSAND/UL (ref 4.31–10.16)

## 2020-09-22 PROCEDURE — 97116 GAIT TRAINING THERAPY: CPT

## 2020-09-22 PROCEDURE — 99232 SBSQ HOSP IP/OBS MODERATE 35: CPT | Performed by: INTERNAL MEDICINE

## 2020-09-22 PROCEDURE — 84100 ASSAY OF PHOSPHORUS: CPT | Performed by: INTERNAL MEDICINE

## 2020-09-22 PROCEDURE — 83735 ASSAY OF MAGNESIUM: CPT | Performed by: INTERNAL MEDICINE

## 2020-09-22 PROCEDURE — 80048 BASIC METABOLIC PNL TOTAL CA: CPT | Performed by: INTERNAL MEDICINE

## 2020-09-22 PROCEDURE — 85025 COMPLETE CBC W/AUTO DIFF WBC: CPT | Performed by: INTERNAL MEDICINE

## 2020-09-22 PROCEDURE — 82948 REAGENT STRIP/BLOOD GLUCOSE: CPT

## 2020-09-22 RX ORDER — PANTOPRAZOLE SODIUM 40 MG/1
40 TABLET, DELAYED RELEASE ORAL
Status: DISCONTINUED | OUTPATIENT
Start: 2020-09-22 | End: 2020-09-23 | Stop reason: HOSPADM

## 2020-09-22 RX ORDER — POLYETHYLENE GLYCOL 3350 17 G/17G
17 POWDER, FOR SOLUTION ORAL DAILY PRN
Status: DISCONTINUED | OUTPATIENT
Start: 2020-09-22 | End: 2020-09-23 | Stop reason: HOSPADM

## 2020-09-22 RX ORDER — OXYCODONE HYDROCHLORIDE 10 MG/1
10 TABLET ORAL
Qty: 60 TABLET | Refills: 0 | Status: SHIPPED | OUTPATIENT
Start: 2020-09-22

## 2020-09-22 RX ADMIN — DOCUSATE SODIUM AND SENNOSIDES 1 TABLET: 8.6; 5 TABLET ORAL at 17:14

## 2020-09-22 RX ADMIN — ATORVASTATIN CALCIUM 40 MG: 40 TABLET, FILM COATED ORAL at 08:54

## 2020-09-22 RX ADMIN — ACETAMINOPHEN 975 MG: 325 TABLET, FILM COATED ORAL at 21:08

## 2020-09-22 RX ADMIN — DOCUSATE SODIUM AND SENNOSIDES 1 TABLET: 8.6; 5 TABLET ORAL at 08:54

## 2020-09-22 RX ADMIN — LIDOCAINE 5% 1 PATCH: 700 PATCH TOPICAL at 08:54

## 2020-09-22 RX ADMIN — PANTOPRAZOLE SODIUM 40 MG: 40 TABLET, DELAYED RELEASE ORAL at 08:54

## 2020-09-22 RX ADMIN — ACETAMINOPHEN 975 MG: 325 TABLET, FILM COATED ORAL at 17:13

## 2020-09-22 RX ADMIN — ACETAMINOPHEN 975 MG: 325 TABLET, FILM COATED ORAL at 05:08

## 2020-09-22 RX ADMIN — OXYCODONE HYDROCHLORIDE 10 MG: 10 TABLET ORAL at 05:12

## 2020-09-22 RX ADMIN — BICALUTAMIDE 50 MG: 50 TABLET, FILM COATED ORAL at 08:55

## 2020-09-22 NOTE — PROGRESS NOTES
Glasses Rx given. INTERNAL MEDICINE RESIDENCY PROGRESS NOTE     Name: Sania Christopher   Age & Sex: 80 y o  male   MRN: [id number]  Unit/Bed#: [facility] 929-01   Encounter: [id number]  Team: SOD Team B     PATIENT INFORMATION     Name: Sania Christopher   Age & Sex: 80 y o  male   MRN: [address] [facility] Stay Days: 4    ASSESSMENT/PLAN     Principal Problem:    Symptomatic anemia  Active Problems:    History of AAA (abdominal aortic aneurysm) repair    History of prostate cancer    Chronic kidney disease    Attention to nephrostomy (HCC)    HTN (hypertension)    YULIET (acute kidney injury) ([address] )    HLD (hyperlipidemia)    Back pain    Elevated troponin      * Symptomatic anemia  Assessment & Plan  · Patient anemic on presentation with Hemoglobin 5 7  · Status post 3 U PRBC transfusion   · Weakness and loss of energy for two weeks but worsening over the past two days  · Endorses dark stool, no jam blood; On presentation, the patient was hemodynamically stable in no acute distress  · CT abdomen and pelvis- stable large abdominal aortic aneurysm measuring 9 7 x 10 2 cm with stent graft  · U/S revealed AAA with endograft in place and no sign of endograft leak      · (Of note, recent hospitalization in mid- August for anemia of hgb 5 1 with undetermined cause (endoscopy showing sliding hiatal hernia and no signs of bleeding, colonoscopy with poor preparation and recommendation for repeat colonoscopy which has not been performed since; SPEP negative for monoclonal bands;  Heyde's syndrome and von willebrand disease were also ruled out; hgb 7 8 at discharge at the time)    Plan:  · Monitor Hemoglobin Q8H  · On protonix infusion   · EGD with no signs of aortoduodenal fistula or upper GI bleed  · Colonoscopy with poor bowel prep  · Continue to monitor stools   · Hemoglobin stable at 7 7  · Will likely discharge home on home hospice    History of AAA (abdominal aortic aneurysm) repair  Assessment & Plan  Hx of AAA s/p repair complicated by endoleak  Stable large abdominal aortic aneurysm measuring 9 7 x 10 2 cm with stent graft  U/S revealed AAA with endograft in place and no sign of endograft leak  Consider possible aortoenteral fistula though less likely  Vascular surgery consult  Consider CTA but given CKD will need to optimize first, nephro consult    History of prostate cancer  Assessment & Plan  Hx of metastatic prostate cancer without hx of chemotherapy  Per chart review, hx of spinal mets  On chart review: Patient had nephrostomy tube placed  for 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  Per records from [facility], patient did have bilateral nephrostomy tubes placed, however now presents with only L side tube in place  - Patient reports no difficulties with nephrostomy at this time, denies any hematuria  - If any issues with nephrostomy develop, would consider consulting Urology  - Monitor urine output    Chronic kidney disease  Assessment & Plan  On chart review, ESRD per records from [facility], with baseline Cr 1 7 to 1 8  Did state history of temporary dialysis during prior hospitalization, however was not on regular hemodialysis afterward  - Continue to monitor creatinine  Current creatinine 2 87 with BUN 69   - Nephrology consult  - IV fluids    Attention to nephrostomy [facility])  Assessment & Plan  On chart review: Patient had nephrostomy tube placed  for 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  Per records from [facility], patient did have bilateral nephrostomy tubes placed, however now presents with only L side tube in place  - Patient reports no difficulties with nephrostomy at this time, denies any hematuria  - If any issues with nephrostomy develop, would consider consulting Urology  - Monitor urine output    HTN (hypertension)  Assessment & Plan  Will hold home meds at this time as unclear what patient is taking    Patient's pharmacy: [facility]  Closed at this time  Will need to call and clarify which medications the patient has been filling and taking    YULIET (acute kidney injury) [facility])  Assessment & Plan  · On chart review, ESRD per records from [facility], with baseline Cr 1 7 to 1 8  Did state history of temporary dialysis during prior hospitalization, however was not on regular hemodialysis afterward  · Creatinine improving  · Continue quarter normal saline/D5 at 75cc/hr per nephrology       HLD (hyperlipidemia)  Assessment & Plan  Continue atorvastatin    Back pain  Assessment & Plan  · History of metastatic prostate cancer without hx of chemotherapy, hx of spinal mets per chart review  · Hx of AAA s/p repair complicated by endoleak  · Stable large abdominal aortic aneurysm measuring 9 7 x 10 2 cm with stent graft  · U/S revealed AAA with endograft in place and no sign of endograft leak  · Pain regimen  · Scheduled tylenol  · Lidocaine patch   · roxicodone 2 5mg q4 hours PRN moderate pain  · roxicodone 5mg q4 hours PRN severe pain  · Continues to have pain, will consult palliative care     Elevated troponin  Assessment & Plan  · Troponin 0 05 on admission  · In the setting of CKD, no ACS symptoms       Disposition:  Continue inpatient pending hospice referral for home hospice    SUBJECTIVE     Patient seen and examined at bedside  No acute events overnight  Denies chest pain, SOB, diaphoresis, nausea/vomiting/diarrhea, fevers/chills       OBJECTIVE     Vitals:    20 1216 20 1456 20 2219 20 0730   BP: 127/61 (!) 113/46 127/60 (!) 114/48   Pulse: 81 69 96 77   Resp: 16 16 16 18   Temp:  98 2 °F (36 8 °C) 99 2 °F (37 3 °C) 98 6 °F (37 °C)   TempSrc:       SpO2: 94% 95% 93% 92%   Weight:       Height:          Temperature:   Temp (24hrs), Av 6 °F (37 °C), Min:98 2 °F (36 8 °C), Max:99 2 °F (37 3 °C)    Temperature: 98 6 °F (37 °C)  Intake & Output:  I/O        0701 - 09/20 0700 09/20 0701 - 09/21 0700 09/21 0701 - 09/22 0700    P  O  120 1920     I V  (mL/kg) 1712 3 (29 7) 2010 1 (35 4)     Blood       IV Piggyback       Total Intake(mL/kg) 1832 3 (31 8) 3930 1 (69 2)     Urine (mL/kg/hr) 1925 (1 4) 775 (0 6)     Stool  0     Total Output 1925 775     Net -92 7 +3155 1            Unmeasured Urine Occurrence  4 x     Unmeasured Stool Occurrence  4 x         Weights:   IBW: 68 4 kg    Body mass index is 19 04 kg/m²  Weight (last 2 days)     Date/Time   Weight    09/21/20 0546   56 8 (125 22)    09/20/20 0537   57 7 (127 21)            Physical Exam  Constitutional:       General: He is not in acute distress  Appearance: He is well-developed  He is not diaphoretic  HENT:      Head: Normocephalic and atraumatic  Nose: Nose normal       Mouth/Throat:      Pharynx: No oropharyngeal exudate  Eyes:      General: No scleral icterus  Right eye: No discharge  Left eye: No discharge  Conjunctiva/sclera: Conjunctivae normal    Neck:      Musculoskeletal: Neck supple  Vascular: No JVD  Cardiovascular:      Rate and Rhythm: Normal rate and regular rhythm  Heart sounds: Normal heart sounds  No murmur  No friction rub  No gallop  Pulmonary:      Effort: Pulmonary effort is normal  No respiratory distress  Breath sounds: Normal breath sounds  No wheezing or rales  Abdominal:      General: Bowel sounds are normal  There is no distension  Palpations: Abdomen is soft  Tenderness: There is no abdominal tenderness  There is no guarding or rebound  Musculoskeletal: Normal range of motion  Skin:     General: Skin is warm and dry  Findings: No erythema  Neurological:      Mental Status: He is alert and oriented to person, place, and time  LABORATORY DATA     Labs: I have personally reviewed pertinent reports    Results from last 7 days   Lab Units 09/22/20  0705 09/21/20  0542 09/20/20  2353  09/20/20  0529   WBC Thousand/uL 6  47 8 00  --   --  9 94   HEMOGLOBIN g/dL 7 7* 8 0* 8 1*   < > 9 4*   HEMATOCRIT % 25 4* 26 0*  --   --  30 7*   PLATELETS Thousands/uL 194 189  --   --  237   NEUTROS PCT % 78* 83*  --   --  80*   MONOS PCT % 7 7  --   --  7    < > = values in this interval not displayed  Results from last 7 days   Lab Units 09/22/20  0543 09/21/20  0542 09/20/20  0529  09/18/20  1640   POTASSIUM mmol/L 4 2 3 9 4 4   < > 5 0   CHLORIDE mmol/L 114* 112* 110*   < > 103   CO2 mmol/L 24 26 24   < > 28   BUN mg/dL 33* 39* 45*   < > 69*   CREATININE mg/dL 1 68* 1 75* 2 00*   < > 2 87*   CALCIUM mg/dL 9 3 9 5 9 9   < > 10 0   ALK PHOS U/L  --   --   --   --  276*   ALT U/L  --   --   --   --  16   AST U/L  --   --   --   --  52*    < > = values in this interval not displayed  Results from last 7 days   Lab Units 09/22/20  0543 09/20/20  0529   MAGNESIUM mg/dL 2 5 2 5     Results from last 7 days   Lab Units 09/22/20  0543 09/20/20  0529   PHOSPHORUS mg/dL 3 3 3 4              Results from last 7 days   Lab Units 09/19/20  0150 09/18/20  2352 09/18/20  1940   TROPONIN I ng/mL 0 05* 0 06* 0 05*       IMAGING & DIAGNOSTIC TESTING     Radiology Results: I have personally reviewed pertinent reports  Ct Chest Abdomen Pelvis Wo Contrast    Result Date: 9/18/2020  Impression: 1  Widespread osseous metastases again visualized  2   Stable large abdominal aortic aneurysm measuring 9 7 x 10 2 cm with stent graft  3   Stable prostatomegaly protruding into the bladder base  4   Stable right lung ground glass densities  Follow-up CT as previously recommended  5  Workstation performed: [id number]     Other Diagnostic Testing: I have personally reviewed pertinent reports      ACTIVE MEDICATIONS     Current Facility-Administered Medications   Medication Dose Route Frequency    acetaminophen (TYLENOL) tablet 975 mg  975 mg Oral Q8H [facility]    atorvastatin (LIPITOR) tablet 40 mg  40 mg Oral Daily    bicalutamide (CASODEX) tablet 50 mg  50 mg Oral Daily    HYDROmorphone (DILAUDID) injection 0 5 mg  0 5 mg Intravenous Q3H PRN    lidocaine (LIDODERM) 5 % patch 1 patch  1 patch Topical Daily    ondansetron (ZOFRAN) injection 4 mg  4 mg Intravenous Q4H PRN    oxyCODONE (ROXICODONE) immediate release tablet 10 mg  10 mg Oral Q3H PRN    oxyCODONE (ROXICODONE) IR tablet 5 mg  5 mg Oral Q3H PRN    pantoprazole (PROTONIX) EC tablet 40 mg  40 mg Oral Early Morning    senna-docusate sodium (SENOKOT S) 8 6-50 mg per tablet 1 tablet  1 tablet Oral BID       VTE Pharmacologic Prophylaxis: Reason for no pharmacologic prophylaxis GIB  VTE Mechanical Prophylaxis: sequential compression device    Portions of the record may have been created with voice recognition software  Occasional wrong word or "sound a like" substitutions may have occurred due to the inherent limitations of voice recognition software    Read the chart carefully and recognize, using context, where substitutions have occurred   ==  Zuleyka Coffey, DO  [address]  Internal Medicine Residency PGY-3

## 2020-09-22 NOTE — PLAN OF CARE
Problem: Potential for Falls  Goal: Patient will remain free of falls  Description: INTERVENTIONS:  - Assess patient frequently for physical needs  -  Identify cognitive and physical deficits and behaviors that affect risk of falls    -  [place] fall precautions as indicated by assessment   - Educate patient/family on patient safety including physical limitations  - Instruct patient to call for assistance with activity based on assessment  - Modify environment to reduce risk of injury  - Consider OT/PT consult to assist with strengthening/mobility  Outcome: Progressing     Problem: PAIN - ADULT  Goal: Verbalizes/displays adequate comfort level or baseline comfort level  Description: Interventions:  - Encourage patient to monitor pain and request assistance  - Assess pain using appropriate pain scale  - Administer analgesics based on type and severity of pain and evaluate response  - Implement non-pharmacological measures as appropriate and evaluate response  - Consider cultural and social influences on pain and pain management  - Notify physician/advanced practitioner if interventions unsuccessful or patient reports new pain  Outcome: Progressing     Problem: INFECTION - ADULT  Goal: Absence or prevention of progression during hospitalization  Description: INTERVENTIONS:  - Assess and monitor for signs and symptoms of infection  - Monitor lab/diagnostic results  - Monitor all insertion sites, i e  indwelling lines, tubes, and drains  - Monitor endotracheal if appropriate and nasal secretions for changes in amount and color  - [place] appropriate cooling/warming therapies per order  - Administer medications as ordered  - Instruct and encourage patient and family to use good hand hygiene technique  - Identify and instruct in appropriate isolation precautions for identified infection/condition  Outcome: Progressing  Goal: Absence of fever/infection during neutropenic period  Description: INTERVENTIONS:  - Monitor WBC    Outcome: Progressing     Problem: SAFETY ADULT  Goal: Patient will remain free of falls  Description: INTERVENTIONS:  - Assess patient frequently for physical needs  -  Identify cognitive and physical deficits and behaviors that affect risk of falls    -  [place] fall precautions as indicated by assessment   - Educate patient/family on patient safety including physical limitations  - Instruct patient to call for assistance with activity based on assessment  - Modify environment to reduce risk of injury  - Consider OT/PT consult to assist with strengthening/mobility  Outcome: Progressing  Goal: Maintain or return to baseline ADL function  Description: INTERVENTIONS:  -  Assess patient's ability to carry out ADLs; assess patient's baseline for ADL function and identify physical deficits which impact ability to perform ADLs (bathing, care of mouth/teeth, toileting, grooming, dressing, etc )  - Assess/evaluate cause of self-care deficits   - Assess range of motion  - Assess patient's mobility; develop plan if impaired  - Assess patient's need for assistive devices and provide as appropriate  - Encourage maximum independence but intervene and supervise when necessary  - Involve family in performance of ADLs  - Assess for home care needs following discharge   - Consider OT consult to assist with ADL evaluation and planning for discharge  - Provide patient education as appropriate  Outcome: Progressing  Goal: Maintain or return mobility status to optimal level  Description: INTERVENTIONS:  - Assess patient's baseline mobility status (ambulation, transfers, stairs, etc )    - Identify cognitive and physical deficits and behaviors that affect mobility  - Identify mobility aids required to assist with transfers and/or ambulation (gait belt, sit-to-stand, lift, walker, cane, etc )  - [place] fall precautions as indicated by assessment  - Record patient progress and toleration of activity level on Mobility SBAR; progress patient to next Phase/Stage  - Instruct patient to call for assistance with activity based on assessment  - Consider rehabilitation consult to assist with strengthening/weightbearing, etc   Outcome: Progressing     Problem: DISCHARGE PLANNING  Goal: Discharge to home or other facility with appropriate resources  Description: INTERVENTIONS:  - Identify barriers to discharge w/patient and caregiver  - Arrange for needed discharge resources and transportation as appropriate  - Identify discharge learning needs (meds, wound care, etc )  - Arrange for interpretive services to assist at discharge as needed  - Refer to Case Management Department for coordinating discharge planning if the patient needs post-hospital services based on physician/advanced practitioner order or complex needs related to functional status, cognitive ability, or social support system  Outcome: Progressing     Problem: Knowledge Deficit  Goal: Patient/family/caregiver demonstrates understanding of disease process, treatment plan, medications, and discharge instructions  Description: Complete learning assessment and assess knowledge base  Interventions:  - Provide teaching at level of understanding  - Provide teaching via preferred learning methods  Outcome: Progressing     Problem: Nutrition/Hydration-ADULT  Goal: Nutrient/Hydration intake appropriate for improving, restoring or maintaining nutritional needs  Description: Monitor and assess patient's nutrition/hydration status for malnutrition  Collaborate with interdisciplinary team and initiate plan and interventions as ordered  Monitor patient's weight and dietary intake as ordered or per policy  Utilize nutrition screening tool and intervene as necessary  Determine patient's food preferences and provide high-protein, high-caloric foods as appropriate       INTERVENTIONS:  - Monitor oral intake, urinary output, labs, and treatment plans  - Assess nutrition and hydration status and recommend course of action  - Evaluate amount of meals eaten  - Assist patient with eating if necessary   - Allow adequate time for meals  - Recommend/ encourage appropriate diets, oral nutritional supplements, and vitamin/mineral supplements  - Order, calculate, and assess calorie counts as needed  - Recommend, monitor, and adjust tube feedings and TPN/PPN based on assessed needs  - Assess need for intravenous fluids  - Provide specific nutrition/hydration education as appropriate  - Include patient/family/caregiver in decisions related to nutrition  Outcome: Progressing     Problem: Prexisting or High Potential for Compromised Skin Integrity  Goal: Skin integrity is maintained or improved  Description: INTERVENTIONS:  - Identify patients at risk for skin breakdown  - Assess and monitor skin integrity  - Assess and monitor nutrition and hydration status  - Monitor labs   - Assess for incontinence   - Turn and reposition patient  - Assist with mobility/ambulation  - Relieve pressure over bony prominences  - Avoid friction and shearing  - Provide appropriate hygiene as needed including keeping skin clean and dry  - Evaluate need for skin moisturizer/barrier cream  - Collaborate with interdisciplinary team   - Patient/family teaching  - Consider wound care consult   Outcome: Progressing

## 2020-09-22 NOTE — PROGRESS NOTES
NEPHROLOGY PROGRESS NOTE   Pam Aguilar 80 y o  male MRN: [id number]  Unit/Bed#: [facility] 929-01 Encounter: [id number]    ASSESSMENT & PLAN:  80 y o   male with pmh of metastatic prostate cancer, hypertension, AAA status post repair complicated by endoleak, history of Josue  needing short dialysis in 2019 and left nephrostomy tube placement with CKD stage 3/4, MGUS, severe aortic stenosis  who was admitted for multiple complaints including weakness back pain body aches  Nephrology has been consulted for evaluation and management of acute kidney injury   Was found to have admission hemoglobin 5 7 and received PRBC  Acute kidney injury, POA on 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  -baseline creatinine 1 7-2 0  -admission creatinine on 09/18 was 2 8  -acute kidney injury likely prerenal azotemia in the setting of severe anemia with admission hemoglobin 5 7     -Chronic kidney disease likely due to a prior episode of acute kidney injury with incomplete recovery, history of acute kidney injury in 2019 requiring dialysis  also has some component of obstructive uropathy and is status post left PCN  -CT abdomen pelvis from 09/18 was suggestive of widespread osseous metastasis, stable AAA with stent graft, stable prostatomegaly  Previously seen right hydronephrosis has decreased  Bilateral renal hypodensities favoring cysts  -UA with microscopy - wbc numerous and RBC 2-4  -function improving to creatinine 1 68 today, continue monitoring  Has been off iv fluids since 9/22  Primary hypertension with chronic kidney disease  -blood pressure acceptable, avoid hypotension  -currently not on any antihypertensive medications  At home on amlodipine and Cardizem    Anemia: hb decreased to 7 7  -admission hb 5 7 with iron sat 33 %  -not a candidate for MARA due to malignancy  -s/p PRBC     -had EGD and colonoscopy on 9/21- Per GI "EGD/push on 9/21 showed mild gastritis and some leukoplakia in the 3rd portion of the duodenum but no source of bleeding up to the proximal jejunum  Colonoscopy was unfortunately aborted due to poor prep "    CKD/MBD:   -was taken off calcitriol as PTH was 41 7  monitor PTH as outpatient  -last phosphorus wnl at 3 3    H/o obstructive uropathy with left PCN in place: recommend outpatient urology follow up  PCN draining well so far    -as per records from [facility], patient used to have bilateral PCN in the past   Right PCN fell off  He was seen at the hospital in March 2020 and left PCN was exchanged  Right PCN was not placed at that time  -on recent CT abdomen previously seen right hydronephrosis has decreased  Left nephrostomy tube in place   -would need outpatient follow up with Urology    Metastatic prostate carcinoma: follow up with hem onc/urology, currently on casodex  History of AAA repair with endoleak:  Continue to follow primary team recommendations  CT scan was done without contrast, showed stable large AAA 9 7 x 10 2 cm with stent  Hypernatremia/hyperkalemia: resolved    Discussed with primary team                                                                                                                                                                                        SUBJECTIVE:  No new complaints  No shortness of breath  No chest pain    OBJECTIVE:  Current Weight: Weight - Scale: 56 8 kg (125 lb 3 5 oz)  Vitals:    09/22/20 0730   BP: (!) 114/48   Pulse: 77   Resp: 18   Temp: 98 6 °F (37 °C)   SpO2: 92%       Intake/Output Summary (Last 24 hours) at 9/22/2020 0824  Last data filed at 9/22/2020 0501  Gross per 24 hour   Intake 587 66 ml   Output 1075 ml   Net -487 34 ml       Physical Exam  General:  Ill looking, awake  Eyes: Conjunctivae pink,  Sclera anicteric  ENT: lips and mucous membranes moist  Neck: supple   Chest: Clear to Auscultation both lungs,  no crackles, ronchus or wheezing    CVS: S1 & S2 present, normal rate, regular rhythm, ESM+  Abdomen: soft, non-tender, non-distended, Bowel sounds normoactive  Extremities: no edema of  legs  Skin: no rash  Neuro: awake, alert, oriented x3        Medications:    Current Facility-Administered Medications:     acetaminophen (TYLENOL) tablet 975 mg, 975 mg, Oral, Q8H [address], Gui Pott, DO, 975 mg at 09/22/20 0508    atorvastatin (LIPITOR) tablet 40 mg, 40 mg, Oral, Daily, Gui Pott, DO, 40 mg at 09/21/20 0910    bicalutamide (CASODEX) tablet 50 mg, 50 mg, Oral, Daily, Gui Pott, DO, 50 mg at 09/21/20 0910    HYDROmorphone (DILAUDID) injection 0 5 mg, 0 5 mg, Intravenous, Q3H PRN, Norma Padilla MD    lidocaine (LIDODERM) 5 % patch 1 patch, 1 patch, Topical, Daily, Shari Jones MD, 1 patch at 09/21/20 0912    ondansetron (ZOFRAN) injection 4 mg, 4 mg, Intravenous, Q4H PRN, Gui Pott, DO    oxyCODONE (ROXICODONE) immediate release tablet 10 mg, 10 mg, Oral, Q3H PRN, Norma Padilla MD, 10 mg at 09/22/20 5499    oxyCODONE (ROXICODONE) IR tablet 5 mg, 5 mg, Oral, Q3H PRN, Norma Padilla MD    pantoprazole (PROTONIX) EC tablet 40 mg, 40 mg, Oral, Early Morning, Arthurine DO Irvin    senna-docusate sodium (SENOKOT S) 8 6-50 mg per tablet 1 tablet, 1 tablet, Oral, BID, Gui Pott, DO, 1 tablet at 09/21/20 1737    Invasive Devices:        Lab Results:   Results from last 7 days   Lab Units 09/22/20  0705 09/22/20  0543 09/21/20  0542 09/20/20  2353  09/20/20  0529  09/18/20  1640   WBC Thousand/uL 6 47  --  8 00  --   --  9 94   < > 7 60   HEMOGLOBIN g/dL 7 7*  --  8 0* 8 1*   < > 9 4*   < > 5 7*   HEMATOCRIT % 25 4*  --  26 0*  --   --  30 7*   < > 20 6*   PLATELETS Thousands/uL 194  --  189  --   --  237   < > 273   POTASSIUM mmol/L  --  4 2 3 9  --   --  4 4   < > 5 0   CHLORIDE mmol/L  --  114* 112*  --   --  110*   < > 103   CO2 mmol/L  --  24 26  --   --  24   < > 28   BUN mg/dL  --  33* 39*  --   --  45*   < > 69*   CREATININE mg/dL  --  1 68* 1 75*  --   -- 2 00*   < > 2 87*   CALCIUM mg/dL  --  9 3 9 5  --   --  9 9   < > 10 0   MAGNESIUM mg/dL  --  2 5  --   --   --  2 5  --   --    PHOSPHORUS mg/dL  --  3 3  --   --   --  3 4  --   --    ALK PHOS U/L  --   --   --   --   --   --   --  276*   ALT U/L  --   --   --   --   --   --   --  16   AST U/L  --   --   --   --   --   --   --  52*    < > = values in this interval not displayed  Previous work up:  CT abd pelvis without contrast   IMPRESSION:  1  Widespread osseous metastases again visualized  2   Stable large abdominal aortic aneurysm measuring 9 7 x 10 2 cm with stent graft  3   Stable prostatomegaly protruding into the bladder base  4   Stable right lung ground glass densities  Follow-up CT as previously recommended    Portions of the record may have been created with voice recognition software  Occasional wrong word or "sound a like" substitutions may have occurred due to the inherent limitations of voice recognition software  Read the chart carefully and recognize, using context, where substitutions have occurred  If you have any questions, please contact the dictating provider

## 2020-09-22 NOTE — CASE MANAGEMENT
[facility]  Anticipate d/c to home tomorrow via dtr's transport  -hospice to open pt on Thursday 9/24  VM left for pt's dtr Allie Handing ([phone]) to confirm above plan and to notify of co-pay cost for oxycodone

## 2020-09-22 NOTE — PROGRESS NOTES
Progress Note - [facility]  Allison Rodas 80 y o  male MRN: [id number]  Unit/Bed#: [facility] 929-01 Encounter: [id number]      ASSESSMENT AND PLAN:    51-year-old male with PMH of AAA status post EVAR, metastatic prostate cancer, 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, hiatal hernia, severe aortic stenosis, MGUS admitted with diffuse pain throughout body and fatigue  GI consulted for anemia/melena  1  Melena - etiology unclear  Initially presented with anemia with reported melena for most days of the past month  EGD/push on 9/21 showed mild gastritis and some leukoplakia in the 3rd portion of the duodenum but no source of bleeding up to the proximal jejunum  Colonoscopy was unfortunately aborted due to poor prep  Differential remains broad and includes AVM vs malignancy vs polyp vs diverticular vs other  · No plan to repeat colonoscopy at this time; can consider outpatient colonoscopy with 2-day bowel prep vs outpatient capsule endoscopy  · Outpatient follow-up in the office with discussion of above; will have office staff contact patient to schedule appointment  · Continue PPI daily  · Avoid NSAIDs  · Follow-up biopsies  · Monitor stool output    2  Anemia - came in with hemoglobin of 5 7 and is status post 3 units PRBC during hospitalization  Hgb this morning 7 7  Secondary to GI bleed although source remains unknown due to limitations of colonoscopy from poor prep  · Monitor blood counts  · Transfuse as needed for hemoglobin less than 7; may target a slightly higher threshold given co-morbidities    3  Severe aortic stenosis - TTE showing gradient 39, valve area 0 9 cm2  No active persistent symptoms  Possible SB AVM as source of melena/anemia; Heyde's syndrome? · Workup for melena/anemia as detailed above  · Continue management per primary team    4  Metastatic prostatic cancer, cancer-related pain - reports pain all over    · Palliative care following; analgesic regimen per their recommendations  · Bowel regimen in setting of opioid analgesia  · Follow-up with Med/Onc  · Goals of care; discussion of possible hospice? will defer to primary team and Palliative care      GI will sign off  Please call with any questions or concerns  ______________________________________________________________________    SUBJECTIVE:     Patient seen and examined at bedside  Denies any abdominal pain currently although states he always "has pain"  Reports poor appetite but states food tastes great  Just doesn't have much appetite  Denies any findings of blood in stool  Dark brown stools  Denies dysphagia or odynophagia       Medication Administration - last 24 hours from 09/21/2020 0742 to 09/22/2020 0819       Date/Time Order Dose Route Action Action by     09/21/2020 0910 atorvastatin (LIPITOR) tablet 40 mg 40 mg Oral Given Francine Calderon, KINGSLEY     09/21/2020 0910 bicalutamide (CASODEX) tablet 50 mg 50 mg Oral Given Francine Calderon, KINGSLEY     09/21/2020 2021 pantoprazole (PROTONIX) 80 mg in sodium chloride 0 9 % 100 mL infusion 8 mg/hr Intravenous [address] German Gonsalves RN     09/21/2020 1104 pantoprazole (PROTONIX) 80 mg in sodium chloride 0 9 % 100 mL infusion 8 mg/hr Intravenous Continue from [address], CRNA     09/22/2020 0508 acetaminophen (TYLENOL) tablet 975 mg 975 mg Oral Given Cresencioa Brina, KINGSLEY     09/21/2020 2220 acetaminophen (TYLENOL) tablet 975 mg 975 mg Oral Given German Gonsalves, RN     09/21/2020 1406 acetaminophen (TYLENOL) tablet 975 mg 975 mg Oral Given Francine Calderon, KINGSLEY     09/21/2020 1737 senna-docusate sodium (SENOKOT S) 8 6-50 mg per tablet 1 tablet 1 tablet Oral Given Corrinne Dally, RN     09/21/2020 0911 senna-docusate sodium (SENOKOT S) 8 6-50 mg per tablet 1 tablet 1 tablet Oral Not Given Francine Calderon RN     09/21/2020 2224 lidocaine (LIDODERM) 5 % patch 1 patch 1 patch Topical Patch Removed German Gonsalves RN 09/21/2020 0912 lidocaine (LIDODERM) 5 % patch 1 patch 1 patch Topical Medication Applied Francine Millers, KINGSLEY     09/21/2020 1611 multi-electrolyte (PLASMALYTE-A/ISOLYTE-S PH 7 4) IV solution 0 mL/hr Intravenous Stopped Cyndimario Leonie, KINGSLEY     09/21/2020 1344 multi-electrolyte (PLASMALYTE-A/ISOLYTE-S PH 7 4) IV solution 75 mL/hr Intravenous New Bag Francine Shankardanielle, RN     09/21/2020 1343 multi-electrolyte (PLASMALYTE-A/ISOLYTE-S PH 7 4) IV solution 0 mL/hr Intravenous Stopped Francine Kvng, RN     09/22/2020 6905 oxyCODONE (ROXICODONE) immediate release tablet 10 mg 10 mg Oral Given Roque Cam RN          OBJECTIVE:     Objective   Blood pressure (!) 114/48, pulse 77, temperature 98 6 °F (37 °C), resp  rate 18, height 5' 8" (1 727 m), weight 56 8 kg (125 lb 3 5 oz), SpO2 92 %  Body mass index is 19 04 kg/m²  Intake/Output Summary (Last 24 hours) at 9/22/2020 0743  Last data filed at 9/22/2020 0501  Gross per 24 hour   Intake 587 66 ml   Output 1075 ml   Net -487 34 ml       PHYSICAL EXAM:   General Appearance: Awake and alert, in no acute distress  Abdomen: Soft, non-tender, non-distended; bowel sounds normal; no masses or no organomegaly    Invasive Devices     Peripheral Intravenous Line            Peripheral IV 09/18/20 Left Forearm 3 days    Peripheral IV 09/18/20 Left;Upper Arm 3 days          Drain            Nephrostomy Left -- days                LAB RESULTS:  No results displayed because visit has over 200 results            RADIOLOGY RESULTS: I have personally reviewed pertinent imaging studies       ==  Paulette Davalos,   Gastroenterology Fellow, PGY-4  [facility]

## 2020-09-22 NOTE — UTILIZATION REVIEW
Continued Stay Review    Date: 9/21/20                Current Patient Class: IP Current Level of Care: MS    HPI:86 y o  male initially admitted on 9/18 with symptomatic anemia of hgb 5 7 with transfusion, increasing weakness x 2 weeks, elevated troponin, back pain s/p metastatic prostate CA with spinal mets  YULIET, CKD  Assessment/Plan:  Family has accepted home hospice for the pt and will be active with hospice on Thursday 9/24  Monitor hgb q 8 hr  On protonix infusion, Off IV fluids  Pt has no complaints of SOB , chest pain today        Pertinent Labs/Diagnostic Results:       Results from last 7 days   Lab Units 09/22/20  0705 09/21/20  0542 09/20/20  2353 09/20/20  1627 09/20/20  0529 09/19/20  0603 09/18/20  1640   WBC Thousand/uL 6 47 8 00  --   --  9 94 7 54 7 60   HEMOGLOBIN g/dL 7 7* 8 0* 8 1* 8 8* 9 4* 8 2*  8 3* 5 7*   HEMATOCRIT % 25 4* 26 0*  --   --  30 7* 26 2*  26 4* 20 6*   PLATELETS Thousands/uL 194 189  --   --  237 196 273   NEUTROS ABS Thousands/µL 5 03 6 64  --   --  8 03* 5 64 5 98         Results from last 7 days   Lab Units 09/22/20  0543 09/21/20  0542 09/20/20  0529 09/19/20  0603 09/18/20  1640   SODIUM mmol/L 144 143 139 144 139   POTASSIUM mmol/L 4 2 3 9 4 4 4 5 5 0   CHLORIDE mmol/L 114* 112* 110* 112* 103   CO2 mmol/L 24 26 24 25 28   ANION GAP mmol/L 6 5 5 7 8   BUN mg/dL 33* 39* 45* 60* 69*   CREATININE mg/dL 1 68* 1 75* 2 00* 2 08* 2 87*   EGFR ml/min/1 73sq m 36 34 29 28 19   CALCIUM mg/dL 9 3 9 5 9 9 9 6 10 0   CALCIUM, IONIZED mmol/L  --   --   --   --  1 23   MAGNESIUM mg/dL 2 5  --  2 5  --   --    PHOSPHORUS mg/dL 3 3  --  3 4  --   --      Results from last 7 days   Lab Units 09/18/20  1640   AST U/L 52*   ALT U/L 16   ALK PHOS U/L 276*   TOTAL PROTEIN g/dL 8 3*   ALBUMIN g/dL 3 1*   TOTAL BILIRUBIN mg/dL 0 26     Results from last 7 days   Lab Units 09/22/20  1107 09/22/20  0903   POC GLUCOSE mg/dl 94 70     Results from last 7 days   Lab Units 09/22/20  0543 09/21/20  0542 09/20/20  0529 09/19/20  0603 09/18/20  1640   GLUCOSE RANDOM mg/dL 60* 97 126 107 208*     Results from last 7 days   Lab Units 09/19/20  0150 09/18/20  2352 09/18/20  1940 09/18/20  1640   TROPONIN I ng/mL 0 05* 0 06* 0 05* 0 05*     Results from last 7 days   Lab Units 09/19/20  0603   FERRITIN ng/mL 78         Results from last 7 days   Lab Units 09/21/20  1750   CLARITY UA  Cloudy   COLOR UA  Yellow   SPEC GRAV UA  1 011   PH UA  6 5   GLUCOSE UA mg/dl Negative   KETONES UA mg/dl Negative   BLOOD UA  Trace*   PROTEIN UA mg/dl 30 (1+)*   NITRITE UA  Negative   BILIRUBIN UA  Negative   UROBILINOGEN UA E U /dl 0 2   LEUKOCYTES UA  Moderate*   WBC UA /hpf Innumerable*   RBC UA /hpf 2-4   BACTERIA UA /hpf Occasional   EPITHELIAL CELLS WET PREP /hpf None Seen     Vital Signs:   09/22/20 15:12:21      88   18   115/53   74   94 %          09/22/20 1100                  95 %      None (Room air)    09/22/20 07:30:42   98 6 °F (37 °C)   77   18   114/48Abnormal     70   92 %          09/21/20 22:19:48   99 2 °F (37 3 °C)   96   16   127/60   82   93 %          09/21/20 14:56:56   98 2 °F (36 8 °C)   69   16   113/46Abnormal     68   95 %          09/21/20 1216      81   16   127/61      94 %      None (Room air)    09/21/20 1201      80   16   95/52      100 %   5 L/min   Simple mask    09/21/20 1026   98 2 °F (36 8 °C)                         09/21/20 1014      84   20   134/61      96 %      None (Room air)    09/21/20 07:09:01   97 8 °F (36 6 °C)   98   19   118/57   77   95 %          09/20/20 23:52:16   99 1 °F (37 3 °C)   70   18   117/49Abnormal     72   94 %          09/20/20 15:18:49   98 °F (36 7 °C)   68   18   119/48Abnormal     72   92 %          09/20/20 0655   99 °F (37 2 °C)   81   20   136/61      93 %            Medications:   Scheduled Medications:  acetaminophen, 975 mg, Oral, Q8H RONALD  atorvastatin, 40 mg, Oral, Daily  bicalutamide, 50 mg, Oral, Daily  lidocaine, 1 patch, Topical, Daily  pantoprazole, 40 mg, Oral, Early Morning  senna-docusate sodium, 1 tablet, Oral, BID    Continuous IV Infusions:     PRN Meds:  HYDROmorphone, 0 5 mg, Intravenous, Q3H PRN  ondansetron, 4 mg, Intravenous, Q4H PRN  oxyCODONE, 10 mg, Oral, Q3H PRN - x 1 9/22  oxyCODONE, 5 mg, Oral, Q3H PRN  polyethylene glycol, 17 g, Oral, Daily PRN    Discharge Plan: PT is going on hospice on Thursday 9/24    Network Utilization Review Department  [email or web address]  ATTENTION: Please call with any questions or concerns to [phone] and carefully listen to the prompts so that you are directed to the right person  All voicemails are confidential   [facility] all requests for admission clinical reviews, approved or denied determinations and any other requests to dedicated fax number below belonging to the campus where the patient is receiving treatment   List of dedicated fax numbers for the Facilities:  [address] DENIALS (Administrative/Medical Necessity) [phone]   [address] (Maternity/NICU/Pediatrics) [phone]   Rhiannon Maddox [phone]   [facility] [phone]   Deirdre Jeanette [phone]   Samantha Hernandez [phone]   [address] [place] [address] [phone]   [facility]  [phone]   [address] [facility] [address]  [address] [facility] [address] [facility] [phone]

## 2020-09-22 NOTE — PLAN OF CARE
Problem: Potential for Falls  Goal: Patient will remain free of falls  Description: INTERVENTIONS:  - Assess patient frequently for physical needs  -  Identify cognitive and physical deficits and behaviors that affect risk of falls    -  [place] fall precautions as indicated by assessment   - Educate patient/family on patient safety including physical limitations  - Instruct patient to call for assistance with activity based on assessment  - Modify environment to reduce risk of injury  - Consider OT/PT consult to assist with strengthening/mobility  Outcome: Progressing     Problem: PAIN - ADULT  Goal: Verbalizes/displays adequate comfort level or baseline comfort level  Description: Interventions:  - Encourage patient to monitor pain and request assistance  - Assess pain using appropriate pain scale  - Administer analgesics based on type and severity of pain and evaluate response  - Implement non-pharmacological measures as appropriate and evaluate response  - Consider cultural and social influences on pain and pain management  - Notify physician/advanced practitioner if interventions unsuccessful or patient reports new pain  Outcome: Progressing     Problem: INFECTION - ADULT  Goal: Absence or prevention of progression during hospitalization  Description: INTERVENTIONS:  - Assess and monitor for signs and symptoms of infection  - Monitor lab/diagnostic results  - Monitor all insertion sites, i e  indwelling lines, tubes, and drains  - Monitor endotracheal if appropriate and nasal secretions for changes in amount and color  - [place] appropriate cooling/warming therapies per order  - Administer medications as ordered  - Instruct and encourage patient and family to use good hand hygiene technique  - Identify and instruct in appropriate isolation precautions for identified infection/condition  Outcome: Progressing  Goal: Absence of fever/infection during neutropenic period  Description: INTERVENTIONS:  - Monitor WBC    Outcome: Progressing     Problem: SAFETY ADULT  Goal: Patient will remain free of falls  Description: INTERVENTIONS:  - Assess patient frequently for physical needs  -  Identify cognitive and physical deficits and behaviors that affect risk of falls    -  [place] fall precautions as indicated by assessment   - Educate patient/family on patient safety including physical limitations  - Instruct patient to call for assistance with activity based on assessment  - Modify environment to reduce risk of injury  - Consider OT/PT consult to assist with strengthening/mobility  Outcome: Progressing  Goal: Maintain or return to baseline ADL function  Description: INTERVENTIONS:  -  Assess patient's ability to carry out ADLs; assess patient's baseline for ADL function and identify physical deficits which impact ability to perform ADLs (bathing, care of mouth/teeth, toileting, grooming, dressing, etc )  - Assess/evaluate cause of self-care deficits   - Assess range of motion  - Assess patient's mobility; develop plan if impaired  - Assess patient's need for assistive devices and provide as appropriate  - Encourage maximum independence but intervene and supervise when necessary  - Involve family in performance of ADLs  - Assess for home care needs following discharge   - Consider OT consult to assist with ADL evaluation and planning for discharge  - Provide patient education as appropriate  Outcome: Progressing  Goal: Maintain or return mobility status to optimal level  Description: INTERVENTIONS:  - Assess patient's baseline mobility status (ambulation, transfers, stairs, etc )    - Identify cognitive and physical deficits and behaviors that affect mobility  - Identify mobility aids required to assist with transfers and/or ambulation (gait belt, sit-to-stand, lift, walker, cane, etc )  - [place] fall precautions as indicated by assessment  - Record patient progress and toleration of activity level on Mobility SBAR; progress patient to next Phase/Stage  - Instruct patient to call for assistance with activity based on assessment  - Consider rehabilitation consult to assist with strengthening/weightbearing, etc   Outcome: Progressing     Problem: DISCHARGE PLANNING  Goal: Discharge to home or other facility with appropriate resources  Description: INTERVENTIONS:  - Identify barriers to discharge w/patient and caregiver  - Arrange for needed discharge resources and transportation as appropriate  - Identify discharge learning needs (meds, wound care, etc )  - Arrange for interpretive services to assist at discharge as needed  - Refer to Case Management Department for coordinating discharge planning if the patient needs post-hospital services based on physician/advanced practitioner order or complex needs related to functional status, cognitive ability, or social support system  Outcome: Progressing     Problem: Knowledge Deficit  Goal: Patient/family/caregiver demonstrates understanding of disease process, treatment plan, medications, and discharge instructions  Description: Complete learning assessment and assess knowledge base  Interventions:  - Provide teaching at level of understanding  - Provide teaching via preferred learning methods  Outcome: Progressing     Problem: Nutrition/Hydration-ADULT  Goal: Nutrient/Hydration intake appropriate for improving, restoring or maintaining nutritional needs  Description: Monitor and assess patient's nutrition/hydration status for malnutrition  Collaborate with interdisciplinary team and initiate plan and interventions as ordered  Monitor patient's weight and dietary intake as ordered or per policy  Utilize nutrition screening tool and intervene as necessary  Determine patient's food preferences and provide high-protein, high-caloric foods as appropriate       INTERVENTIONS:  - Monitor oral intake, urinary output, labs, and treatment plans  - Assess nutrition and hydration status and recommend course of action  - Evaluate amount of meals eaten  - Assist patient with eating if necessary   - Allow adequate time for meals  - Recommend/ encourage appropriate diets, oral nutritional supplements, and vitamin/mineral supplements  - Order, calculate, and assess calorie counts as needed  - Recommend, monitor, and adjust tube feedings and TPN/PPN based on assessed needs  - Assess need for intravenous fluids  - Provide specific nutrition/hydration education as appropriate  - Include patient/family/caregiver in decisions related to nutrition  Outcome: Progressing     Problem: Prexisting or High Potential for Compromised Skin Integrity  Goal: Skin integrity is maintained or improved  Description: INTERVENTIONS:  - Identify patients at risk for skin breakdown  - Assess and monitor skin integrity  - Assess and monitor nutrition and hydration status  - Monitor labs   - Assess for incontinence   - Turn and reposition patient  - Assist with mobility/ambulation  - Relieve pressure over bony prominences  - Avoid friction and shearing  - Provide appropriate hygiene as needed including keeping skin clean and dry  - Evaluate need for skin moisturizer/barrier cream  - Collaborate with interdisciplinary team   - Patient/family teaching  - Consider wound care consult   Outcome: Progressing

## 2020-09-22 NOTE — OCCUPATIONAL THERAPY NOTE
Occupational Therapy Cancellation Note        Patient Name: Eric Ramires  [id number] Date: 9/22/2020    Orders received and chart reviewed  OT attempted to see, however patient with new active orders for hospice  Will continue to follow to be seen for OT evaluation, pending hospice decision, as appropriate/when medically cleared  Gary GUILLERMO,OTR/L

## 2020-09-22 NOTE — PLAN OF CARE
Problem: Potential for Falls  Goal: Patient will remain free of falls  Description: INTERVENTIONS:  - Assess patient frequently for physical needs  -  Identify cognitive and physical deficits and behaviors that affect risk of falls    -  [place] fall precautions as indicated by assessment   - Educate patient/family on patient safety including physical limitations  - Instruct patient to call for assistance with activity based on assessment  - Modify environment to reduce risk of injury  - Consider OT/PT consult to assist with strengthening/mobility  Outcome: Progressing     Problem: PAIN - ADULT  Goal: Verbalizes/displays adequate comfort level or baseline comfort level  Description: Interventions:  - Encourage patient to monitor pain and request assistance  - Assess pain using appropriate pain scale  - Administer analgesics based on type and severity of pain and evaluate response  - Implement non-pharmacological measures as appropriate and evaluate response  - Consider cultural and social influences on pain and pain management  - Notify physician/advanced practitioner if interventions unsuccessful or patient reports new pain  Outcome: Progressing     Problem: INFECTION - ADULT  Goal: Absence or prevention of progression during hospitalization  Description: INTERVENTIONS:  - Assess and monitor for signs and symptoms of infection  - Monitor lab/diagnostic results  - Monitor all insertion sites, i e  indwelling lines, tubes, and drains  - Monitor endotracheal if appropriate and nasal secretions for changes in amount and color  - [place] appropriate cooling/warming therapies per order  - Administer medications as ordered  - Instruct and encourage patient and family to use good hand hygiene technique  - Identify and instruct in appropriate isolation precautions for identified infection/condition  Outcome: Progressing  Goal: Absence of fever/infection during neutropenic period  Description: INTERVENTIONS:  - Monitor WBC    Outcome: Progressing     Problem: SAFETY ADULT  Goal: Patient will remain free of falls  Description: INTERVENTIONS:  - Assess patient frequently for physical needs  -  Identify cognitive and physical deficits and behaviors that affect risk of falls    -  [place] fall precautions as indicated by assessment   - Educate patient/family on patient safety including physical limitations  - Instruct patient to call for assistance with activity based on assessment  - Modify environment to reduce risk of injury  - Consider OT/PT consult to assist with strengthening/mobility  Outcome: Progressing  Goal: Maintain or return to baseline ADL function  Description: INTERVENTIONS:  -  Assess patient's ability to carry out ADLs; assess patient's baseline for ADL function and identify physical deficits which impact ability to perform ADLs (bathing, care of mouth/teeth, toileting, grooming, dressing, etc )  - Assess/evaluate cause of self-care deficits   - Assess range of motion  - Assess patient's mobility; develop plan if impaired  - Assess patient's need for assistive devices and provide as appropriate  - Encourage maximum independence but intervene and supervise when necessary  - Involve family in performance of ADLs  - Assess for home care needs following discharge   - Consider OT consult to assist with ADL evaluation and planning for discharge  - Provide patient education as appropriate  Outcome: Progressing  Goal: Maintain or return mobility status to optimal level  Description: INTERVENTIONS:  - Assess patient's baseline mobility status (ambulation, transfers, stairs, etc )    - Identify cognitive and physical deficits and behaviors that affect mobility  - Identify mobility aids required to assist with transfers and/or ambulation (gait belt, sit-to-stand, lift, walker, cane, etc )  - [place] fall precautions as indicated by assessment  - Record patient progress and toleration of activity level on Mobility SBAR; progress patient to next Phase/Stage  - Instruct patient to call for assistance with activity based on assessment  - Consider rehabilitation consult to assist with strengthening/weightbearing, etc   Outcome: Progressing     Problem: DISCHARGE PLANNING  Goal: Discharge to home or other facility with appropriate resources  Description: INTERVENTIONS:  - Identify barriers to discharge w/patient and caregiver  - Arrange for needed discharge resources and transportation as appropriate  - Identify discharge learning needs (meds, wound care, etc )  - Arrange for interpretive services to assist at discharge as needed  - Refer to Case Management Department for coordinating discharge planning if the patient needs post-hospital services based on physician/advanced practitioner order or complex needs related to functional status, cognitive ability, or social support system  Outcome: Progressing     Problem: Knowledge Deficit  Goal: Patient/family/caregiver demonstrates understanding of disease process, treatment plan, medications, and discharge instructions  Description: Complete learning assessment and assess knowledge base  Interventions:  - Provide teaching at level of understanding  - Provide teaching via preferred learning methods  Outcome: Progressing     Problem: Nutrition/Hydration-ADULT  Goal: Nutrient/Hydration intake appropriate for improving, restoring or maintaining nutritional needs  Description: Monitor and assess patient's nutrition/hydration status for malnutrition  Collaborate with interdisciplinary team and initiate plan and interventions as ordered  Monitor patient's weight and dietary intake as ordered or per policy  Utilize nutrition screening tool and intervene as necessary  Determine patient's food preferences and provide high-protein, high-caloric foods as appropriate       INTERVENTIONS:  - Monitor oral intake, urinary output, labs, and treatment plans  - Assess nutrition and hydration status and recommend course of action  - Evaluate amount of meals eaten  - Assist patient with eating if necessary   - Allow adequate time for meals  - Recommend/ encourage appropriate diets, oral nutritional supplements, and vitamin/mineral supplements  - Order, calculate, and assess calorie counts as needed  - Recommend, monitor, and adjust tube feedings and TPN/PPN based on assessed needs  - Assess need for intravenous fluids  - Provide specific nutrition/hydration education as appropriate  - Include patient/family/caregiver in decisions related to nutrition  Outcome: Progressing     Problem: Prexisting or High Potential for Compromised Skin Integrity  Goal: Skin integrity is maintained or improved  Description: INTERVENTIONS:  - Identify patients at risk for skin breakdown  - Assess and monitor skin integrity  - Assess and monitor nutrition and hydration status  - Monitor labs   - Assess for incontinence   - Turn and reposition patient  - Assist with mobility/ambulation  - Relieve pressure over bony prominences  - Avoid friction and shearing  - Provide appropriate hygiene as needed including keeping skin clean and dry  - Evaluate need for skin moisturizer/barrier cream  - Collaborate with interdisciplinary team   - Patient/family teaching  - Consider wound care consult   Outcome: Progressing

## 2020-09-22 NOTE — HOSPICE NOTE
Hospice referral received  Called daughter, Rosanna Del Toro and spoke with her in reference to referral  Rosanna Del Toro wanted me to confirm that hospice is covered under patients insurance  Called and checked with [facility] insurance staff who confirmed patients insurance does cover hospice  Met with Rosanna Del Toro in her home and obtained hospice consents, copies faxed to [facility] and also given to [facility]  Sent TT to Dr Toya Martinez who escripted oxycodone to [place] here in the hospital  Called pharmacy and they confirmed receipt of escript and report there is a 43 cent copay  Plan is for Rosanna Del Toro to pick patient up tomorrow and pt will be open to [facility] on Thursday  Rosanna Del Toro has 24 hour number for any questions  CATHY berrios

## 2020-09-22 NOTE — PHYSICAL THERAPY NOTE
Physical Therapy Progress Note     09/22/20 1419   PT Last Visit   PT Visit Date 09/22/20   Pain Assessment   Pain Assessment Tool Pain Assessment not indicated - pt denies pain   Pain Score No Pain   Hospital Pain Intervention(s) Medication (See MAR); Repositioned; Ambulation/increased activity   Restrictions/Precautions   Other Precautions Pain; Fall Risk   General   Chart Reviewed Yes   Response to Previous Treatment Patient with no complaints from previous session  Family/Caregiver Present No   Cognition   Overall Cognitive Status WFL   Arousal/Participation Alert; Responsive   Attention Within functional limits   Orientation Level Oriented X4   Memory Within functional limits   Following Commands Follows all commands and directions without difficulty   Transfers   Sit to Stand 5  Supervision   Additional items Increased time required;Armrests   Stand to Sit 5  Supervision   Additional items Armrests; Increased time required   Ambulation/Elevation   Gait pattern Improper Weight shift;Decreased foot clearance;Shuffling;Excessively slow; Short stride   Gait Assistance 5  Supervision   Assistive Device Rolling walker   Distance 360'   Endurance Deficit   Endurance Deficit Yes   Activity Tolerance   Activity Tolerance Patient limited by fatigue   Nurse Made Aware RN ok to see   Assessment   Prognosis Good   Problem List Decreased strength;Decreased range of motion;Decreased endurance; Impaired balance;Decreased mobility   Assessment Pt progressing well, ambulates 360' with RW and S only, S for all sit to stand transfers  Pt will continue to benenfit from skilled PT to increase strength, endurance and balance to maximize safe functional mobility  Goals   Patient Goals none stated   STG Expiration Date 09/30/20   Short Term Goal #1 pt will:  Increase bilateral LE strength 1/2 grade to facilitate independent mobility, Perform all bed mobility tasks w/ supervision to decrease fall risk factors, Perform all transfers modified independent to improve independence, Ambulate 250 ft  with roller walker w/ supervision w/o LOB, Navigate 2 stairs w/ supervision with unilateral handrail to facilitate return to previous living environment, Increase all balance 1/2 grade to decrease risk for falls and Improve Barthel Index score to 65 or greater to facilitate independence    Plan   Treatment/Interventions Functional transfer training;LE strengthening/ROM; Therapeutic exercise; Endurance training;Bed mobility;Gait training   Progress Progressing toward goals   PT Frequency   (3-6x/week)   Recommendation   PT Discharge Recommendation Home with skilled therapy  (HHPT and family support)   Equipment Recommended Walker   PT - OK to Discharge Yes   Additional Comments   (when medically cleared)     Mook Zamora, PTA

## 2020-09-22 NOTE — PROGRESS NOTES
INTERNAL MEDICINE PROGRESS NOTE     Name: Yulia Thomas   Age & Sex: 80 y o  male   MRN: [id number]  [facility]   Encounter: [id number]  Team: SOD Team B     PATIENT INFORMATION     Name: Yulia Thomas   Age & Sex: 80 y o  male   MRN: [address] [facility] [place] Stay Days: 4    ASSESSMENT/PLAN     Principal Problem:    Symptomatic anemia  Active Problems:    History of prostate cancer    Chronic kidney disease    Attention to nephrostomy (HCC)    HTN (hypertension)    HLD (hyperlipidemia)    History of AAA (abdominal aortic aneurysm) repair    YULIET (acute kidney injury) ([facility] )    Back pain    Elevated troponin      Elevated troponin  Assessment & Plan  · Troponin 0 05 on admission  · In the setting of CKD, no ACS symptoms     Back pain  Assessment & Plan  · History of metastatic prostate cancer without hx of chemotherapy, hx of spinal mets per chart review  · Hx of AAA s/p repair complicated by endoleak  · Stable large abdominal aortic aneurysm measuring 9 7 x 10 2 cm with stent graft  · U/S revealed AAA with endograft in place and no sign of endograft leak  · Pain regimen  · Scheduled tylenol  · Lidocaine patch   · roxicodone 2 5mg q4 hours PRN moderate pain  · roxicodone 5mg q4 hours PRN severe pain  · Continues to have pain, will consult palliative care     YULIET (acute kidney injury) ([facility] )  Assessment & Plan  · On chart review, ESRD per records from [facility], with baseline Cr 1 7 to 1 8  Did state history of temporary dialysis during prior hospitalization, however was not on regular hemodialysis afterward  · Creatinine improving  · Continue quarter normal saline/D5 at 75cc/hr per nephrology       History of AAA (abdominal aortic aneurysm) repair  Assessment & Plan  Hx of AAA s/p repair complicated by endoleak  Stable large abdominal aortic aneurysm measuring 9 7 x 10 2 cm with stent graft    U/S revealed AAA with endograft in place and no sign of endograft leak    Consider possible aortoenteral fistula though less likely  Vascular surgery consult  Consider CTA but given CKD will need to optimize first, nephro consult    HLD (hyperlipidemia)  Assessment & Plan  Continue atorvastatin    HTN (hypertension)  Assessment & Plan  Will hold home meds at this time as unclear what patient is taking  Patient's pharmacy: [facility]  Closed at this time  Will need to call and clarify which medications the patient has been filling and taking    Attention to nephrostomy 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  Per records from [facility], patient did have bilateral nephrostomy tubes placed, however now presents with only L side tube in place  - Patient reports no difficulties with nephrostomy at this time, denies any hematuria  - If any issues with nephrostomy develop, would consider consulting Urology  - Monitor urine output    Chronic kidney disease  Assessment & Plan  On chart review, ESRD per records from [facility], with baseline Cr 1 7 to 1 8  Did state history of temporary dialysis during prior hospitalization, however was not on regular hemodialysis afterward  - Continue to monitor creatinine  Current creatinine 2 87 with BUN 69   - Nephrology consult  - IV fluids    History of prostate cancer  Assessment & Plan  Hx of metastatic prostate cancer without hx of chemotherapy  Per chart review, hx of spinal mets  On chart review: Patient had nephrostomy tube placed  for 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  Per records from [facility], patient did have bilateral nephrostomy tubes placed, however now presents with only L side tube in place  - Patient reports no difficulties with nephrostomy at this time, denies any hematuria    - If any issues with nephrostomy develop, would consider consulting Urology  - Monitor urine output    * Symptomatic anemia  Assessment & Plan  · Patient anemic on presentation with Hemoglobin 5 7  · Status post 3 U PRBC transfusion   · Weakness and loss of energy for two weeks but worsening over the past two days  · Endorses dark stool, no jam blood; On presentation, the patient was hemodynamically stable in no acute distress  · CT abdomen and pelvis- stable large abdominal aortic aneurysm measuring 9 7 x 10 2 cm with stent graft  · U/S revealed AAA with endograft in place and no sign of endograft leak      · (Of note, recent hospitalization in mid- August for anemia of hgb 5 1 with undetermined cause (endoscopy showing sliding hiatal hernia and no signs of bleeding, colonoscopy with poor preparation and recommendation for repeat colonoscopy which has not been performed since; SPEP negative for monoclonal bands; Heyde's syndrome and von willebrand disease were also ruled out; hgb 7 8 at discharge at the time)    Plan:  · Monitor Hemoglobin Q8H  · On protonix infusion   · EGD with no signs of aortoduodenal fistula or upper GI bleed  · Colonoscopy with poor bowel prep  · Continue to monitor stools   · Hemoglobin stable at 7 7  · Will likely discharge home on home hospice      Disposition: Continue inpatient pending hospice referral for home hospice    SUBJECTIVE     Patient seen and examined  No acute events overnight  The patient states that he is experiencing no pain this morning, but continues to experience some weakness  He states that his stools have been a dark brown color  He denies chills, chest pain, headache, and urinary symptoms       OBJECTIVE     Vitals:    20 1216 20 1456 20 2219 20 0730   BP: 127/61 (!) 113/46 127/60 (!) 114/48   Pulse: 81 69 96 77   Resp: 16 16 16 18   Temp:  98 2 °F (36 8 °C) 99 2 °F (37 3 °C) 98 6 °F (37 °C)   TempSrc:       SpO2: 94% 95% 93% 92%   Weight:       Height:          Temperature:   Temp (24hrs), Av 6 °F (37 °C), Min:98 2 °F (36 8 °C), Max:99 2 °F (37 3 °C)    Temperature: 98 6 °F (37 °C)  Intake & Output:  I/O       09/20 0701 - 09/21 0700 09/21 0701 - 09/22 0700 09/22 0701 - 09/23 0700    P  O  1920 120     I V  (mL/kg) 2010 1 (35 4) 691 5 (12 2)     Total Intake(mL/kg) 3930 1 (69 2) 811 5 (14 3)     Urine (mL/kg/hr) 775 (0 6) 1075 (0 8)     Stool 0 0     Total Output 775 1075     Net +3155 1 -263 5            Unmeasured Urine Occurrence 4 x      Unmeasured Stool Occurrence 4 x 2 x         Weights:   IBW: 68 4 kg    Body mass index is 19 04 kg/m²  Weight (last 2 days)     Date/Time   Weight    09/21/20 0546   56 8 (125 22)    09/20/20 0537   57 7 (127 21)            Physical Exam  Constitutional:       General: He is not in acute distress  HENT:      Head: Normocephalic  No right periorbital erythema or left periorbital erythema  Eyes:      Extraocular Movements: Extraocular movements intact  Cardiovascular:      Rate and Rhythm: Normal rate and regular rhythm  Heart sounds: Murmur present  Crescendo  decrescendo  systolic murmur present  No friction rub  No S3 sounds  Pulmonary:      Effort: Pulmonary effort is normal       Breath sounds: Normal breath sounds  No decreased breath sounds, wheezing, rhonchi or rales  Abdominal:      General: Abdomen is flat  There is no distension  Tenderness: There is no abdominal tenderness  There is no guarding or rebound  Neurological:      Mental Status: He is alert and oriented to person, place, and time  Cranial Nerves: No cranial nerve deficit  LABORATORY DATA     Labs: I have personally reviewed pertinent reports    Results from last 7 days   Lab Units 09/22/20  0705 09/21/20  0542 09/20/20  2353  09/20/20  0529   WBC Thousand/uL 6 47 8 00  --   --  9 94   HEMOGLOBIN g/dL 7 7* 8 0* 8 1*   < > 9 4*   HEMATOCRIT % 25 4* 26 0*  --   --  30 7*   PLATELETS Thousands/uL 194 189  --   --  237   NEUTROS PCT % 78* 83*  --   --  80*   MONOS PCT % 7 7  -- --  7    < > = values in this interval not displayed  Results from last 7 days   Lab Units 09/22/20  0543 09/21/20  0542 09/20/20  0529  09/18/20  1640   POTASSIUM mmol/L 4 2 3 9 4 4   < > 5 0   CHLORIDE mmol/L 114* 112* 110*   < > 103   CO2 mmol/L 24 26 24   < > 28   BUN mg/dL 33* 39* 45*   < > 69*   CREATININE mg/dL 1 68* 1 75* 2 00*   < > 2 87*   CALCIUM mg/dL 9 3 9 5 9 9   < > 10 0   ALK PHOS U/L  --   --   --   --  276*   ALT U/L  --   --   --   --  16   AST U/L  --   --   --   --  52*    < > = values in this interval not displayed  Results from last 7 days   Lab Units 09/22/20  0543 09/20/20  0529   MAGNESIUM mg/dL 2 5 2 5     Results from last 7 days   Lab Units 09/22/20  0543 09/20/20  0529   PHOSPHORUS mg/dL 3 3 3 4              Results from last 7 days   Lab Units 09/19/20  0150 09/18/20  2352 09/18/20  1940   TROPONIN I ng/mL 0 05* 0 06* 0 05*       IMAGING & DIAGNOSTIC TESTING     Radiology Results: I have personally reviewed pertinent reports  Ct Chest Abdomen Pelvis Wo Contrast    Result Date: 9/18/2020  Impression: 1  Widespread osseous metastases again visualized  2   Stable large abdominal aortic aneurysm measuring 9 7 x 10 2 cm with stent graft  3   Stable prostatomegaly protruding into the bladder base  4   Stable right lung ground glass densities  Follow-up CT as previously recommended  5  Workstation performed: [id number]     Other Diagnostic Testing: I have personally reviewed pertinent reports      ACTIVE MEDICATIONS     Current Facility-Administered Medications   Medication Dose Route Frequency    acetaminophen (TYLENOL) tablet 975 mg  975 mg Oral Q8H [facility]    atorvastatin (LIPITOR) tablet 40 mg  40 mg Oral Daily    bicalutamide (CASODEX) tablet 50 mg  50 mg Oral Daily    HYDROmorphone (DILAUDID) injection 0 5 mg  0 5 mg Intravenous Q3H PRN    lidocaine (LIDODERM) 5 % patch 1 patch  1 patch Topical Daily    ondansetron (ZOFRAN) injection 4 mg  4 mg Intravenous Q4H PRN    oxyCODONE (ROXICODONE) immediate release tablet 10 mg  10 mg Oral Q3H PRN    oxyCODONE (ROXICODONE) IR tablet 5 mg  5 mg Oral Q3H PRN    pantoprazole (PROTONIX) EC tablet 40 mg  40 mg Oral Early Morning    senna-docusate sodium (SENOKOT S) 8 6-50 mg per tablet 1 tablet  1 tablet Oral BID       VTE Pharmacologic Prophylaxis: Reason for no pharmacologic prophylaxis GIB  VTE Mechanical Prophylaxis: sequential compression device    Portions of the record may have been created with voice recognition software  Occasional wrong word or "sound a like" substitutions may have occurred due to the inherent limitations of voice recognition software  Read the chart carefully and recognize, using context, where substitutions have occurred    ==

## 2020-09-22 NOTE — PLAN OF CARE
Problem: PHYSICAL THERAPY ADULT  Goal: Performs mobility at highest level of function for planned discharge setting  See evaluation for individualized goals  Description: Treatment/Interventions: Functional transfer training, LE strengthening/ROM, Therapeutic exercise, Endurance training, Cognitive reorientation, Equipment eval/education, Patient/family training, Bed mobility, Gait training, Spoke to nursing, Spoke to case management, Family  Equipment Recommended: Liliam Pryor       See flowsheet documentation for full assessment, interventions and recommendations  Outcome: Progressing  Note: Prognosis: Good  Problem List: Decreased strength, Decreased range of motion, Decreased endurance, Impaired balance, Decreased mobility  Assessment: Pt progressing well, ambulates 360' with RW and S only, S for all sit to stand transfers  Pt will continue to benenfit from skilled PT to increase strength, endurance and balance to maximize safe functional mobility  PT Discharge Recommendation: Home with skilled therapy(HHPT and family support)     PT - OK to Discharge: Yes    See flowsheet documentation for full assessment

## 2020-09-22 NOTE — CASE MANAGEMENT
LOS: 4  Not a bundle  Readmission risk: Green  CM spoke with pt's dtr Abdon Denise ([phone]) to complete CM assessment/discuss dcp  Pt lives with Abdon Denise in a 2-story home with 5 steps at entrance  Pt receives assist with ADLs from dtr  Pt has DME including: cane  No prior [facility] services  Per last admission and CM note--pt currently has [facility] plan through [facility] and was not able to be set up with [facility] due to this plan  CM discussed same with dtr  Dtr reports they are still in process of switching pt's insurance at this time  CM discussed with pt's dtr that CM received hospice consult for pt  CM made dtr aware that insurance could potentially also be a barrier for home hospice services  Pt's dtr agreeable with referrals to local hospice agencies  CM advised pt's dtr she will f/u with her once determination from hospice agencies are made  Dtr agreeable with same  Preference for pharmacy is [facility]  No MH/D&A tx hx  No PCP--info-link card provided to pt  No POA  Anticipate family transport  CM reviewed d/c planning process including the following: identifying help at home, patient preference for d/c planning needs, Discharge Lounge, [facility] Meds to Bed program, availability of treatment team to discuss questions or concerns patient and/or family may have regarding understanding medications and recognizing signs and symptoms once discharged  CM also encouraged patient to follow up with all recommended appointments after discharge  Patient advised of importance for patient and family to participate in managing patients medical well being

## 2020-09-23 VITALS
HEART RATE: 58 BPM | OXYGEN SATURATION: 94 % | BODY MASS INDEX: 19.45 KG/M2 | TEMPERATURE: 100.1 F | WEIGHT: 128.31 LBS | SYSTOLIC BLOOD PRESSURE: 114 MMHG | HEIGHT: 68 IN | RESPIRATION RATE: 16 BRPM | DIASTOLIC BLOOD PRESSURE: 50 MMHG

## 2020-09-23 LAB
ANION GAP SERPL CALCULATED.3IONS-SCNC: 7 MMOL/L (ref 4–13)
BASOPHILS # BLD AUTO: 0.01 THOUSANDS/ΜL (ref 0–0.1)
BASOPHILS NFR BLD AUTO: 0 % (ref 0–1)
BUN SERPL-MCNC: 33 MG/DL (ref 5–25)
CALCIUM SERPL-MCNC: 9.1 MG/DL (ref 8.3–10.1)
CHLORIDE SERPL-SCNC: 112 MMOL/L (ref 100–108)
CO2 SERPL-SCNC: 26 MMOL/L (ref 21–32)
CREAT SERPL-MCNC: 1.64 MG/DL (ref 0.6–1.3)
EOSINOPHIL # BLD AUTO: 0.36 THOUSAND/ΜL (ref 0–0.61)
EOSINOPHIL NFR BLD AUTO: 6 % (ref 0–6)
ERYTHROCYTE [DISTWIDTH] IN BLOOD BY AUTOMATED COUNT: 21.5 % (ref 11.6–15.1)
GFR SERPL CREATININE-BSD FRML MDRD: 37 ML/MIN/1.73SQ M
GLUCOSE SERPL-MCNC: 74 MG/DL (ref 65–140)
HCT VFR BLD AUTO: 25.5 % (ref 36.5–49.3)
HGB BLD-MCNC: 7.9 G/DL (ref 12–17)
IMM GRANULOCYTES # BLD AUTO: 0.06 THOUSAND/UL (ref 0–0.2)
IMM GRANULOCYTES NFR BLD AUTO: 1 % (ref 0–2)
LYMPHOCYTES # BLD AUTO: 0.78 THOUSANDS/ΜL (ref 0.6–4.47)
LYMPHOCYTES NFR BLD AUTO: 12 % (ref 14–44)
MAGNESIUM SERPL-MCNC: 2.3 MG/DL (ref 1.6–2.6)
MCH RBC QN AUTO: 25.1 PG (ref 26.8–34.3)
MCHC RBC AUTO-ENTMCNC: 31 G/DL (ref 31.4–37.4)
MCV RBC AUTO: 81 FL (ref 82–98)
MONOCYTES # BLD AUTO: 0.42 THOUSAND/ΜL (ref 0.17–1.22)
MONOCYTES NFR BLD AUTO: 7 % (ref 4–12)
NEUTROPHILS # BLD AUTO: 4.87 THOUSANDS/ΜL (ref 1.85–7.62)
NEUTS SEG NFR BLD AUTO: 74 % (ref 43–75)
NRBC BLD AUTO-RTO: 0 /100 WBCS
PHOSPHATE SERPL-MCNC: 2.7 MG/DL (ref 2.3–4.1)
PLATELET # BLD AUTO: 197 THOUSANDS/UL (ref 149–390)
PMV BLD AUTO: 9.6 FL (ref 8.9–12.7)
POTASSIUM SERPL-SCNC: 3.8 MMOL/L (ref 3.5–5.3)
RBC # BLD AUTO: 3.15 MILLION/UL (ref 3.88–5.62)
SODIUM SERPL-SCNC: 145 MMOL/L (ref 136–145)
WBC # BLD AUTO: 6.5 THOUSAND/UL (ref 4.31–10.16)

## 2020-09-23 PROCEDURE — 84100 ASSAY OF PHOSPHORUS: CPT | Performed by: INTERNAL MEDICINE

## 2020-09-23 PROCEDURE — 85025 COMPLETE CBC W/AUTO DIFF WBC: CPT | Performed by: INTERNAL MEDICINE

## 2020-09-23 PROCEDURE — 80048 BASIC METABOLIC PNL TOTAL CA: CPT | Performed by: INTERNAL MEDICINE

## 2020-09-23 PROCEDURE — 99232 SBSQ HOSP IP/OBS MODERATE 35: CPT | Performed by: INTERNAL MEDICINE

## 2020-09-23 PROCEDURE — 83735 ASSAY OF MAGNESIUM: CPT | Performed by: INTERNAL MEDICINE

## 2020-09-23 RX ORDER — PANTOPRAZOLE SODIUM 40 MG/1
40 TABLET, DELAYED RELEASE ORAL
Qty: 90 TABLET | Refills: 3 | Status: SHIPPED | OUTPATIENT
Start: 2020-09-24

## 2020-09-23 RX ADMIN — OXYCODONE HYDROCHLORIDE 10 MG: 10 TABLET ORAL at 03:54

## 2020-09-23 RX ADMIN — DOCUSATE SODIUM AND SENNOSIDES 1 TABLET: 8.6; 5 TABLET ORAL at 09:12

## 2020-09-23 RX ADMIN — BICALUTAMIDE 50 MG: 50 TABLET, FILM COATED ORAL at 09:15

## 2020-09-23 RX ADMIN — ACETAMINOPHEN 975 MG: 325 TABLET, FILM COATED ORAL at 06:13

## 2020-09-23 RX ADMIN — LIDOCAINE 5% 1 PATCH: 700 PATCH TOPICAL at 09:12

## 2020-09-23 RX ADMIN — PANTOPRAZOLE SODIUM 40 MG: 40 TABLET, DELAYED RELEASE ORAL at 06:14

## 2020-09-23 RX ADMIN — ATORVASTATIN CALCIUM 40 MG: 40 TABLET, FILM COATED ORAL at 09:12

## 2020-09-23 NOTE — OCCUPATIONAL THERAPY NOTE
Occupational therapy Cancellation Note    RECEIVED ORDERS + REVIEWED CHART  PATIENT WITH PLANS TO GO HOME WITH HOSPICE AND NOT APPROPRIATE FOR SKILLED OT INTERVENTIONS AT THIS TIME  OT WILL SIGN OFF AT THIS TIME  PLEASE RECONSULT IF MEDICALLY NECESSARY        Ang Grimes MS, OTR/L

## 2020-09-23 NOTE — PHYSICAL THERAPY NOTE
Physical Therapy Cancellation Note    PT orders received and chart reviewed  Pt noted to be d/c home w/ home hospice  PT will sign off at this time  Please re-consult as appropriate      Gerber Baker, PT, DPT

## 2020-09-23 NOTE — CASE MANAGEMENT
CM confirmed with pt's dtr Kamla Powers will be for pt to be d/c to home today  [facility]-hospice to open pt tomorrow  Dtr to transport at 11am this morning and she already picked up script from [address] for pt  No other needs identified at this time

## 2020-09-23 NOTE — PLAN OF CARE
Problem: Potential for Falls  Goal: Patient will remain free of falls  Description: INTERVENTIONS:  - Assess patient frequently for physical needs  -  Identify cognitive and physical deficits and behaviors that affect risk of falls    -  [place] fall precautions as indicated by assessment   - Educate patient/family on patient safety including physical limitations  - Instruct patient to call for assistance with activity based on assessment  - Modify environment to reduce risk of injury  - Consider OT/PT consult to assist with strengthening/mobility  Outcome: Progressing     Problem: PAIN - ADULT  Goal: Verbalizes/displays adequate comfort level or baseline comfort level  Description: Interventions:  - Encourage patient to monitor pain and request assistance  - Assess pain using appropriate pain scale  - Administer analgesics based on type and severity of pain and evaluate response  - Implement non-pharmacological measures as appropriate and evaluate response  - Consider cultural and social influences on pain and pain management  - Notify physician/advanced practitioner if interventions unsuccessful or patient reports new pain  Outcome: Progressing     Problem: INFECTION - ADULT  Goal: Absence or prevention of progression during hospitalization  Description: INTERVENTIONS:  - Assess and monitor for signs and symptoms of infection  - Monitor lab/diagnostic results  - Monitor all insertion sites, i e  indwelling lines, tubes, and drains  - Monitor endotracheal if appropriate and nasal secretions for changes in amount and color  - [place] appropriate cooling/warming therapies per order  - Administer medications as ordered  - Instruct and encourage patient and family to use good hand hygiene technique  - Identify and instruct in appropriate isolation precautions for identified infection/condition  Outcome: Progressing  Goal: Absence of fever/infection during neutropenic period  Description: INTERVENTIONS:  - Monitor WBC    Outcome: Progressing     Problem: SAFETY ADULT  Goal: Patient will remain free of falls  Description: INTERVENTIONS:  - Assess patient frequently for physical needs  -  Identify cognitive and physical deficits and behaviors that affect risk of falls    -  [place] fall precautions as indicated by assessment   - Educate patient/family on patient safety including physical limitations  - Instruct patient to call for assistance with activity based on assessment  - Modify environment to reduce risk of injury  - Consider OT/PT consult to assist with strengthening/mobility  Outcome: Progressing  Goal: Maintain or return to baseline ADL function  Description: INTERVENTIONS:  -  Assess patient's ability to carry out ADLs; assess patient's baseline for ADL function and identify physical deficits which impact ability to perform ADLs (bathing, care of mouth/teeth, toileting, grooming, dressing, etc )  - Assess/evaluate cause of self-care deficits   - Assess range of motion  - Assess patient's mobility; develop plan if impaired  - Assess patient's need for assistive devices and provide as appropriate  - Encourage maximum independence but intervene and supervise when necessary  - Involve family in performance of ADLs  - Assess for home care needs following discharge   - Consider OT consult to assist with ADL evaluation and planning for discharge  - Provide patient education as appropriate  Outcome: Progressing  Goal: Maintain or return mobility status to optimal level  Description: INTERVENTIONS:  - Assess patient's baseline mobility status (ambulation, transfers, stairs, etc )    - Identify cognitive and physical deficits and behaviors that affect mobility  - Identify mobility aids required to assist with transfers and/or ambulation (gait belt, sit-to-stand, lift, walker, cane, etc )  - [place] fall precautions as indicated by assessment  - Record patient progress and toleration of activity level on Mobility SBAR; progress patient to next Phase/Stage  - Instruct patient to call for assistance with activity based on assessment  - Consider rehabilitation consult to assist with strengthening/weightbearing, etc   Outcome: Progressing     Problem: DISCHARGE PLANNING  Goal: Discharge to home or other facility with appropriate resources  Description: INTERVENTIONS:  - Identify barriers to discharge w/patient and caregiver  - Arrange for needed discharge resources and transportation as appropriate  - Identify discharge learning needs (meds, wound care, etc )  - Arrange for interpretive services to assist at discharge as needed  - Refer to Case Management Department for coordinating discharge planning if the patient needs post-hospital services based on physician/advanced practitioner order or complex needs related to functional status, cognitive ability, or social support system  Outcome: Progressing     Problem: Knowledge Deficit  Goal: Patient/family/caregiver demonstrates understanding of disease process, treatment plan, medications, and discharge instructions  Description: Complete learning assessment and assess knowledge base  Interventions:  - Provide teaching at level of understanding  - Provide teaching via preferred learning methods  Outcome: Progressing     Problem: Nutrition/Hydration-ADULT  Goal: Nutrient/Hydration intake appropriate for improving, restoring or maintaining nutritional needs  Description: Monitor and assess patient's nutrition/hydration status for malnutrition  Collaborate with interdisciplinary team and initiate plan and interventions as ordered  Monitor patient's weight and dietary intake as ordered or per policy  Utilize nutrition screening tool and intervene as necessary  Determine patient's food preferences and provide high-protein, high-caloric foods as appropriate       INTERVENTIONS:  - Monitor oral intake, urinary output, labs, and treatment plans  - Assess nutrition and hydration status and recommend course of action  - Evaluate amount of meals eaten  - Assist patient with eating if necessary   - Allow adequate time for meals  - Recommend/ encourage appropriate diets, oral nutritional supplements, and vitamin/mineral supplements  - Order, calculate, and assess calorie counts as needed  - Recommend, monitor, and adjust tube feedings and TPN/PPN based on assessed needs  - Assess need for intravenous fluids  - Provide specific nutrition/hydration education as appropriate  - Include patient/family/caregiver in decisions related to nutrition  Outcome: Progressing     Problem: Prexisting or High Potential for Compromised Skin Integrity  Goal: Skin integrity is maintained or improved  Description: INTERVENTIONS:  - Identify patients at risk for skin breakdown  - Assess and monitor skin integrity  - Assess and monitor nutrition and hydration status  - Monitor labs   - Assess for incontinence   - Turn and reposition patient  - Assist with mobility/ambulation  - Relieve pressure over bony prominences  - Avoid friction and shearing  - Provide appropriate hygiene as needed including keeping skin clean and dry  - Evaluate need for skin moisturizer/barrier cream  - Collaborate with interdisciplinary team   - Patient/family teaching  - Consider wound care consult   Outcome: Progressing

## 2020-09-23 NOTE — PROGRESS NOTES
NEPHROLOGY PROGRESS NOTE   Raven Munoz 80 y o  male MRN: [id number]  Unit/Bed#: [facility] 929-01 Encounter: [id number]    ASSESSMENT & PLAN:  80 y o   male with pmh of metastatic prostate cancer, hypertension, AAA status post repair complicated by endoleak, history of Josue  needing short dialysis in 2019 and left nephrostomy tube placement with CKD stage 3/4, MGUS, severe aortic stenosis  who was admitted for multiple complaints including weakness back pain body aches  Nephrology has been consulted for evaluation and management of acute kidney injury   Was found to have admission hemoglobin 5 7 and received PRBC  Acute kidney injury, POA on 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  -baseline creatinine 1 7-2 0   -admission creatinine on 09/18 was 2 8  -acute kidney injury likely prerenal azotemia in the setting of severe anemia with admission hemoglobin 5 7     -Chronic kidney disease likely due to a prior episode of acute kidney injury with incomplete recovery, history of acute kidney injury in 2019 requiring dialysis  also has some component of obstructive uropathy and is status post left PCN  -CT abdomen pelvis from 09/18 was suggestive of widespread osseous metastasis, stable AAA with stent graft, stable prostatomegaly  Previously seen right hydronephrosis has decreased  Bilateral renal hypodensities favoring cysts  -UA with microscopy - wbc numerous and RBC 2-4  -function improving to creatinine 1 64 today, continue monitoring  Has been off iv fluids since 9/22  Continue oral hydration     Primary hypertension with chronic kidney disease  -blood pressure acceptable, avoid hypotension  -currently not on any antihypertensive medications  At home on amlodipine and Cardizem  -no need for antihtn meds on discharge  Recommend home BP monitoring     Anemia: hb decreased to 7 7 on 9/22 and today stable at 7 9  -admission hb 5 7 with iron sat 33 %  -not a candidate for MARA due to malignancy  -s/p PRBC     -had EGD and colonoscopy on 9/21- Per GI "EGD/push on 9/21 showed mild gastritis and some leukoplakia in the 3rd portion of the duodenum but no source of bleeding up to the proximal jejunum  Colonoscopy was unfortunately aborted due to poor prep "    CKD/MBD:   -was taken off calcitriol as PTH was 41 7  monitor PTH as outpatient  -last phosphorus wnl     H/o obstructive uropathy with left PCN in place: recommend outpatient urology follow up  PCN draining well so far    -as per records from [facility], patient used to have bilateral PCN in the past   Right PCN fell off  He was seen at the hospital in March 2020 and left PCN was exchanged  Right PCN was not placed at that time  -on recent CT abdomen previously seen right hydronephrosis has decreased  Left nephrostomy tube in place   -would need outpatient follow up with Urology    Metastatic prostate carcinoma: follow up with hem onc/urology, currently on casodex  History of AAA repair with endoleak:  Continue to follow primary team recommendations  CT scan was done without contrast, showed stable large AAA 9 7 x 10 2 cm with stent  Hypernatremia/hyperkalemia: resolved    Discussed with primary team, patient going for home today  Nothing further to add from a renal standpoint  Will sign off  Feel free to call us back for any questions or concerns  Not arranging follow up as he will be going with home hospice per primary team                                                                                                                                                                                       SUBJECTIVE:  No chest pain and no SOB       OBJECTIVE:  Current Weight: Weight - Scale: 58 2 kg (128 lb 4 9 oz)  Vitals:    09/23/20 0720   BP: 114/50   Pulse: 58   Resp: 16   Temp: 100 1 °F (37 8 °C)   SpO2: 94%       Intake/Output Summary (Last 24 hours) at 9/23/2020 0957  Last data filed at 9/23/2020 0720  Gross per 24 hour   Intake 780 ml Output 1175 ml   Net -395 ml       Physical Exam  Constitutional:       General: He is not in acute distress  Appearance: He is well-developed  He is not diaphoretic  HENT:      Head: Normocephalic and atraumatic  Mouth/Throat:      Mouth: Mucous membranes are moist    Eyes:      General: No scleral icterus  Conjunctiva/sclera: Conjunctivae normal       Pupils: Pupils are equal, round, and reactive to light  Neck:      Musculoskeletal: Neck supple  Thyroid: No thyromegaly  Cardiovascular:      Rate and Rhythm: Normal rate and regular rhythm  Heart sounds: Normal heart sounds  No murmur  No friction rub  Pulmonary:      Effort: Pulmonary effort is normal  No respiratory distress  Breath sounds: Normal breath sounds  No wheezing or rales  Abdominal:      General: Bowel sounds are normal  There is no distension  Palpations: Abdomen is soft  Tenderness: There is no abdominal tenderness  Musculoskeletal:         General: No deformity  Right lower leg: No edema  Left lower leg: No edema  Lymphadenopathy:      Cervical: No cervical adenopathy  Skin:     Coloration: Skin is not pale  Nails: There is no clubbing  Neurological:      Mental Status: He is alert and oriented to person, place, and time  He is not disoriented  Psychiatric:         Mood and Affect: Mood normal  Mood is not anxious  Affect is not inappropriate  Behavior: Behavior normal          Thought Content:  Thought content normal              Medications:    Current Facility-Administered Medications:     acetaminophen (TYLENOL) tablet 975 mg, 975 mg, Oral, Q8H [address], Blanco Side, DO, 975 mg at 09/23/20 0864    atorvastatin (LIPITOR) tablet 40 mg, 40 mg, Oral, Daily, Blanco Side, DO, 40 mg at 09/23/20 0912    bicalutamide (CASODEX) tablet 50 mg, 50 mg, Oral, Daily, Timoteo Side, DO, 50 mg at 09/23/20 0915    HYDROmorphone (DILAUDID) injection 0 5 mg, 0 5 mg, Intravenous, Q3H PRN, Yonathan Yanes MD    lidocaine (LIDODERM) 5 % patch 1 patch, 1 patch, Topical, Daily, Melanie Carmen MD, 1 patch at 09/23/20 0912    ondansetron (ZOFRAN) injection 4 mg, 4 mg, Intravenous, Q4H PRN, Birdia Rubinstein, DO    oxyCODONE (ROXICODONE) immediate release tablet 10 mg, 10 mg, Oral, Q3H PRN, Yonathan Yanes MD, 10 mg at 09/23/20 0354    oxyCODONE (ROXICODONE) IR tablet 5 mg, 5 mg, Oral, Q3H PRN, Yonathan Yanes MD    pantoprazole (PROTONIX) EC tablet 40 mg, 40 mg, Oral, Early Morning, Stephen Lopez DO, 40 mg at 09/23/20 9135    polyethylene glycol (MIRALAX) packet 17 g, 17 g, Oral, Daily PRN, Stephen Lopez DO    senna-docusate sodium (SENOKOT S) 8 6-50 mg per tablet 1 tablet, 1 tablet, Oral, BID, Birdia Rubinstein, DO, 1 tablet at 09/23/20 0912    Invasive Devices:        Lab Results:   Results from last 7 days   Lab Units 09/23/20  0542 09/22/20  0705 09/22/20  0543 09/21/20  0542  09/20/20  0529  09/18/20  1640   WBC Thousand/uL 6 50 6 47  --  8 00  --  9 94   < > 7 60   HEMOGLOBIN g/dL 7 9* 7 7*  --  8 0*   < > 9 4*   < > 5 7*   HEMATOCRIT % 25 5* 25 4*  --  26 0*  --  30 7*   < > 20 6*   PLATELETS Thousands/uL 197 194  --  189  --  237   < > 273   POTASSIUM mmol/L 3 8  --  4 2 3 9  --  4 4   < > 5 0   CHLORIDE mmol/L 112*  --  114* 112*  --  110*   < > 103   CO2 mmol/L 26  --  24 26  --  24   < > 28   BUN mg/dL 33*  --  33* 39*  --  45*   < > 69*   CREATININE mg/dL 1 64*  --  1 68* 1 75*  --  2 00*   < > 2 87*   CALCIUM mg/dL 9 1  --  9 3 9 5  --  9 9   < > 10 0   MAGNESIUM mg/dL 2 3  --  2 5  --   --  2 5  --   --    PHOSPHORUS mg/dL 2 7  --  3 3  --   --  3 4  --   --    ALK PHOS U/L  --   --   --   --   --   --   --  276*   ALT U/L  --   --   --   --   --   --   --  16   AST U/L  --   --   --   --   --   --   --  52*    < > = values in this interval not displayed  Previous work up:  CT abd pelvis without contrast   IMPRESSION:  1    Widespread osseous metastases again visualized  2   Stable large abdominal aortic aneurysm measuring 9 7 x 10 2 cm with stent graft  3   Stable prostatomegaly protruding into the bladder base  4   Stable right lung ground glass densities  Follow-up CT as previously recommended    Portions of the record may have been created with voice recognition software  Occasional wrong word or "sound a like" substitutions may have occurred due to the inherent limitations of voice recognition software  Read the chart carefully and recognize, using context, where substitutions have occurred  If you have any questions, please contact the dictating provider

## 2020-09-23 NOTE — DISCHARGE SUMMARY
INTERNAL MEDICINE RESIDENCY DISCHARGE SUMMARY     Ammon Todd   80 y o  male  MRN: [id number]  Room/Bed: [place] [address]/[facility] [address] [place] [address]    Encounter: [id number]    Principal Problem:    Symptomatic anemia  Active Problems:    History of prostate cancer    Chronic kidney disease    Attention to nephrostomy (HCC)    HTN (hypertension)    HLD (hyperlipidemia)    History of AAA (abdominal aortic aneurysm) repair    YULIET (acute kidney injury) ([facility] )    Back pain    Elevated troponin    Cancer-related pain      Elevated troponin  Assessment & Plan  · Troponin 0 05 on admission  · In the setting of CKD, no ACS symptoms     Back pain  Assessment & Plan  · History of metastatic prostate cancer without hx of chemotherapy, hx of spinal mets per chart review  · Hx of AAA s/p repair complicated by endoleak  · Stable large abdominal aortic aneurysm measuring 9 7 x 10 2 cm with stent graft  · U/S revealed AAA with endograft in place and no sign of endograft leak  · Pain regimen  · Scheduled tylenol  · Lidocaine patch   · roxicodone 2 5mg q4 hours PRN moderate pain  · roxicodone 5mg q4 hours PRN severe pain  · Continues to have pain, will consult palliative care     YULIET (acute kidney injury) ([facility] )  Assessment & Plan  · On chart review, ESRD per records from [facility], with baseline Cr 1 7 to 1 8  Did state history of temporary dialysis during prior hospitalization, however was not on regular hemodialysis afterward  · Creatinine improving  · Continue quarter normal saline/D5 at 75cc/hr per nephrology       History of AAA (abdominal aortic aneurysm) repair  Assessment & Plan  Hx of AAA s/p repair complicated by endoleak  Stable large abdominal aortic aneurysm measuring 9 7 x 10 2 cm with stent graft  U/S revealed AAA with endograft in place and no sign of endograft leak  Consider possible aortoenteral fistula though less likely   Vascular surgery consult  Consider CTA but given CKD will need to optimize first, nephro consult    HLD (hyperlipidemia)  Assessment & Plan  Continue atorvastatin    HTN (hypertension)  Assessment & Plan  Will hold home meds at this time as unclear what patient is taking  Patient's pharmacy: [facility]  Closed at this time  Will need to call and clarify which medications the patient has been filling and taking    Attention to nephrostomy [facility])  Assessment & Plan  On chart review: Patient had nephrostomy tube placed  for 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  Per records from [facility], patient did have bilateral nephrostomy tubes placed, however now presents with only L side tube in place  - Patient reports no difficulties with nephrostomy at this time, denies any hematuria  - If any issues with nephrostomy develop, would consider consulting Urology  - Monitor urine output    Chronic kidney disease  Assessment & Plan  On chart review, ESRD per records from [facility], with baseline Cr 1 7 to 1 8  Did state history of temporary dialysis during prior hospitalization, however was not on regular hemodialysis afterward  - Continue to monitor creatinine  Current creatinine 2 87 with BUN 69   - Nephrology consult  - IV fluids    History of prostate cancer  Assessment & Plan  Hx of metastatic prostate cancer without hx of chemotherapy  Per chart review, hx of spinal mets  On chart review: Patient had nephrostomy tube placed  for 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  Per records from [facility], patient did have bilateral nephrostomy tubes placed, however now presents with only L side tube in place  - Patient reports no difficulties with nephrostomy at this time, denies any hematuria    - If any issues with nephrostomy develop, would consider consulting Urology  - Monitor urine output    * Symptomatic anemia  Assessment & Plan  · Patient anemic on presentation with Hemoglobin 5 7  · Status post 3 U PRBC transfusion   · Weakness and loss of energy for two weeks but worsening over the past two days  · Endorses dark stool, no jam blood; On presentation, the patient was hemodynamically stable in no acute distress  · CT abdomen and pelvis- stable large abdominal aortic aneurysm measuring 9 7 x 10 2 cm with stent graft  · U/S revealed AAA with endograft in place and no sign of endograft leak      · (Of note, recent hospitalization in mid- August for anemia of hgb 5 1 with undetermined cause (endoscopy showing sliding hiatal hernia and no signs of bleeding, colonoscopy with poor preparation and recommendation for repeat colonoscopy which has not been performed since; SPEP negative for monoclonal bands; Heyde's syndrome and von willebrand disease were also ruled out; hgb 7 8 at discharge at the time)    Plan:  · Monitor Hemoglobin Q8H  · On protonix infusion   · EGD with no signs of aortoduodenal fistula or upper GI bleed  · Colonoscopy with poor bowel prep  · Continue to monitor stools   · Hemoglobin stable at 7 9  · Will discharge home on home hospice today        [address] COURSE     81yo M with PMH significant for metastatic prostate cancer without hx of chemotherapy (with spinal mets), nephrostomy tube placement, CKD, HTN, aortic stenosis, AAA s/p repair complicated by endoleak without follow up for around one year, with a recent hospitalization in mid- August for anemia of hgb 5 1 with undetermined cause (endoscopy showing sliding hiatal hernia and no signs of bleeding, colonoscopy with poor preparation and recommendation for repeat colonoscopy which has not been performed since; SPEP negative for monoclonal bands;  Heyde's syndrome and von willebrand disease were also ruled out; hgb 7 8 at discharge), who presents for diffuse pain, weakness, and loss of energy for the past two weeks but acutely worsening over the past two days  States that he has diffuse pain all over his body including chest pain that he is unable to describe further sometimes associated with dyspnea 2/2 pain, but states that his most severe pain is in the bilateral lower back described as severe pressure-like pain, 10/10 in severity, not localizable, and described as different from prior back pain  Endorses intermittent diffuse abdominal pain as well without nausea, vomiting, diarrhea  Patient notes dark stools but no jam blood  Last bowel movement yesterday  Denies bloody emesis  Also endorses decreased appetite, dry painful lips  Denies fever, chills, fecal or bowel incontinence      On presentation, the patient was hemodynamically stable in no acute distress, although temperature was 94 7 but subsequently increased to 97 3  /60 and P 70s  Labs were significant for creatinine 2 87, BUN 69, corrected calcium 10 7, alk phos 276, elevated troponin 0 05, hgb 5 7  CT abd pelvis revealed stable AAA  U/S revealed AAA with endograft in place and no sign of endograft leak  2 units pRBC were transfused  During admission, GI was consulted with EGD reading of "Normal esophagus, Mild linear gastric erosions consistent with gastritis, Normal 1st and 2nd portion of duodenum, 3rd portion of duodenum with leukoplakia  No evidence of bleeding seen up to proximal jejunum " Colonoscopy was attempted, but "Large amount of stool seen in the rectosigmoid junction  This could not be traversed  Procedure aborted "      Family was consulted with patients long history of prostatic cancer with metastasis and decision was made to place patient into home hospice for comfort care           DISCHARGE INFORMATION     PCP at Discharge: Dr Marinelli Ards    Admitting Provider: Katie Jewell MD  Admission Date: 9/18/2020    Discharge Provider: No att  providers found  Discharge Date: 9/23/2020    Discharge Disposition: Home with [facility]  Discharge Condition: stable  Discharge with Lines: no    Test Results Pending at Discharge: None    Discharge Diagnoses:  Principal Problem:    Symptomatic anemia  Active Problems:    History of prostate cancer    Chronic kidney disease    Attention to nephrostomy (HCC)    HTN (hypertension)    HLD (hyperlipidemia)    History of AAA (abdominal aortic aneurysm) repair    YULIET (acute kidney injury) ([facility] )    Back pain    Elevated troponin    Cancer-related pain  Resolved Problems:    * No resolved hospital problems  *      Consulting Providers:      Diagnostic & Therapeutic Procedures Performed:  Ct Chest Abdomen Pelvis Wo Contrast    Result Date: 9/18/2020  Impression: 1  Widespread osseous metastases again visualized  2   Stable large abdominal aortic aneurysm measuring 9 7 x 10 2 cm with stent graft  3   Stable prostatomegaly protruding into the bladder base  4   Stable right lung ground glass densities  Follow-up CT as previously recommended  5  Workstation performed: [id number]       Code Status: Level 3 - DNAR and DNI  Advance Directive & Living Will: <no information>  Power of :    POLST:      Medications:  Discharge Medication List as of 9/23/2020 10:38 AM      STOP taking these medications       atorvastatin (LIPITOR) 40 mg tablet Comments:   Reason for Stopping:             Discharge Medication List as of 9/23/2020 10:38 AM      START taking these medications    Details   oxyCODONE (ROXICODONE) 10 MG TABS Take 1 tablet (10 mg total) by mouth every 3 (three) hours as needed (cancer pain    1 o 2 tablets cada 3 horas para los torito)Max Daily Amount: 80 mg, Starting Tue 9/22/2020, Normal      pantoprazole (PROTONIX) 40 mg tablet Take 1 tablet (40 mg total) by mouth daily in the early morning, Starting Thu 9/24/2020, Normal           Discharge Medication List as of 9/23/2020 10:38 AM      CONTINUE these medications which have NOT CHANGED    Details   amLODIPine (NORVASC) 5 mg tablet Take 5 mg by mouth daily, Historical Med      b complex-C-folic acid (NEPHRO-YONIS) 0 8 mg tablet Take 0 8 mg by mouth daily with dinner, Historical Med      bicalutamide (CASODEX) 50 mg tablet Take 50 mg by mouth daily, Historical Med      calcitriol (ROCALTROL) 0 25 mcg capsule Take 0 25 mcg by mouth daily, Historical Med      diltiazem (dilTIAZem CD) 120 mg 24 hr capsule Take 120 mg by mouth daily, Historical Med             Allergies:  No Known Allergies    FOLLOW-UP     PCP Outpatient Follow-up:  [address] [address] [address] [facility], [facility]    Phone: [phone]     Next Steps: Follow up in 6 week(s)      Instructions: The office will call you to schedule follow-up appointment  Tomeka Avila, [address] [facility]  Internal Medicine [phone] [address] [place] [address]     Next Steps: Schedule an appointment as soon as possible for a visit in 1 week(s)            Consulting Providers Follow-up:  Not applicable patient will be getting home hospice/ comfort care    Active Issues Requiring Follow-up:   Not applicable patient will be getting home hospice/ comfort care    Discharge Statement:   I spent 30 minutes minutes discharging the patient  This time was spent on the day of discharge  I had direct contact with the patient on the day of discharge  Additional documentation is required if more than 30 minutes were spent on discharge  Portions of the record may have been created with voice recognition software  Occasional wrong word or "sound a like" substitutions may have occurred due to the inherent limitations of voice recognition software    Read the chart carefully and recognize, using context, where substitutions have occurred     ==  [address]  [place]4

## 2020-09-25 ENCOUNTER — TELEPHONE (OUTPATIENT)
Dept: GASTROENTEROLOGY | Facility: CLINIC | Age: 85
End: 2020-09-25

## 2020-09-25 NOTE — TELEPHONE ENCOUNTER
----- Message from Sylvia Quinones DO sent at 9/22/2020  9:38 AM EDT -----  Regarding: Please schedule outpatient follow-up  Please schedule outpatient follow-up appointment with me in 6-8 weeks for melena/anemia  Thank you      --  Sylvia Quinones DO  Gastroenterology Fellow, PGY-4  [address] [place] [address]

## 2020-10-20 ENCOUNTER — TELEPHONE (OUTPATIENT)
Dept: GASTROENTEROLOGY | Facility: CLINIC | Age: 85
End: 2020-10-20

## 2022-01-01 NOTE — ASSESSMENT & PLAN NOTE
History of stage IV prostate cancer diagnosed 1 year ago, was following with Oncology at [facility]  He had bilateral nephrostomy tubes placed there due to obstructive nephropathy, however has only left nephrostomy tube in currently on presentation   Pt denies any prior history of chemotherapy or radiation  - Chest x-ray suggests infiltrative disease probably secondary to his cancer  - CT chest, abdomen, pelvis 8/14 with enlarged, irregularly contoured prostate gland consistent with prostate malignancy with possible extension into bladder; multiple sclerotic but also some lytic bone lesions consistent with metastasis; mild R hydroureteronephrosis; 10 2 cm AAA s/p EVAR with endoleak confirmed on records; and two ground glass pulmonary nodules likely 2/2 metastasis with recommended non contrast CT follow up in 6 to 12 months  - Patient would like to establish care with Oncology in the area, will consult while he is inpatient for further recommendations  - Follow up as an outpatient with Oncology actual/infant

## 2023-06-01 NOTE — UTILIZATION REVIEW
Initial Clinical Review    Admission: Date/Time/Statement:   Admission Orders (From admission, onward)     Ordered        08/14/20 1409  Inpatient Admission  Once                   Orders Placed This Encounter   Procedures    Inpatient Admission     Standing Status:   Standing     Number of Occurrences:   1     Order Specific Question:   Admitting Physician     Answer:   Brenda Kaur     Order Specific Question:   Level of Care     Answer:   Med Surg [16]     Order Specific Question:   Estimated length of stay     Answer:   More than 2 Midnights     Order Specific Question:   Certification     Answer:   I certify that inpatient services are medically necessary for this patient for a duration of greater than two midnights  See H&P and MD Progress Notes for additional information about the patient's course of treatment  ED Arrival Information     Expected Arrival Acuity Means of Arrival Escorted By Service Admission Type    - 8/14/2020 10:24 Urgent Walk-In Self General Medicine Urgent    Arrival Complaint    weakness        Chief Complaint   Patient presents with    Weakness - Generalized     Per family pt has weakness with ambulation and is no longer able to stand for more than 30 seconds d/t feeling weak and dizzy  Pt has history of prostate cancer  Pt has had similar feeling in the past and needed a blood transfusion     Assessment/Plan:  81 y/o male with Pmhx of prostate cancer dx'd 1 yr ago, CKD, s/p L nephrostomy for obstructive uropathy, chronic anemia, HTN, HLD who presents to ED with generalized weakness, sob on exertion x 1 wk  States similar sxs to when he had low Hgb and needed transfusion  In ED Hgb 5 1 2 units PRBC's ordered  On exam pt cachectic, ill appearing  Dry mucous membranes  (+) hear murmer  Admit inpatient to M/S/Tele unit with symptomatic anemia  Tele monitoring, recheck H&H post transfusion  Consult oncology  Creatinine 2 21, continue to monitor    Pt endorses h/o AAA repair  Check CTA to eval if this cause for anemia  Continue home po meds  Oncology consult 8/14 -- Prostate cancer with bone mets  PSA is requested  Iron studies, SPEP, free light chains requested  Etiology of his anemia is not clear  Bone marrow replacement seems unlikely given an absence of leuko erythro blastic findings  He denies any melena or hematochezia  He denies hematuria  CKD certainly could be contributing  Imaging is anticipated to better understand anatomic abnormalities  Records are requested regarding his prior care  Palliative care consultation could be considered for symptom management  8/15 -- no events overnight  Severe symptomatic anemia on presentation, likely chronic in nature given his h/o malignancy a possible infiltrative disease 2/2 mets  No signs of overt bldeding  Hgb 7 2 post transfusion  Continue to monitor H&H  Continue previous home po meds  Will f/u with oncology and nephrology as op        ED Triage Vitals   Temperature Pulse Respirations Blood Pressure SpO2   08/14/20 1035 08/14/20 1035 08/14/20 1035 08/14/20 1035 08/14/20 1035   97 6 °F (36 4 °C) 73 16 155/68 100 %      Temp Source Heart Rate Source Patient Position - Orthostatic VS BP Location FiO2 (%)   08/14/20 1454 08/14/20 1125 08/14/20 1125 08/14/20 1125 --   Oral Monitor Lying Right arm       Pain Score       08/14/20 1035       2          Wt Readings from Last 1 Encounters:   08/14/20 59 kg (130 lb 1 1 oz)     Additional Vital Signs:   Date/Time   Temp   Pulse   Resp   BP   MAP (mmHg)   SpO2   O2 Device    08/15/20 15:05:18   97 8 °F (36 6 °C)   64   18   119/52   74   96 %       08/15/20 10:57:47   98 4 °F (36 9 °C)   61   16   122/48Abnormal     73   98 %       08/15/20 09:56:37      53Abnormal        139/89   106   100 %       08/15/20 06:50:47   98 5 °F (36 9 °C)   54Abnormal     16   136/51   79   96 %       08/15/20 02:50:20   98 7 °F (37 1 °C)   55   18   96/80   85   96 %   None (Room air) 08/14/20 22:47:19   98 5 °F (36 9 °C)   62   20   135/48Abnormal     77   98 %   None (Room air)    08/14/20 21:22:19   98 4 °F (36 9 °C)   59   20   134/47Abnormal     76   98 %   None (Room air)    08/14/20 2120   98 4 °F (36 9 °C)   60   18   134/60          None (Room air)    08/14/20 2020            132/60             08/14/20 2019   98 1 °F (36 7 °C)   62   20   134/47Abnormal     76   97 %   None (Room air)    08/14/20 20:18:33   98 1 °F (36 7 °C)   62      134/47Abnormal     76   97 %       08/14/20 18:53:56   98 1 °F (36 7 °C)   61   20   135/45Abnormal     75   99 %       08/14/20 18:27:15   97 5 °F (36 4 °C)   60   18   135/47Abnormal     76   99 %       08/14/20 1607   96 5 °F (35 8 °C)Abnormal     58   18   150/61             08/14/20 1600                     None (Room air)    08/14/20 1454   97 5 °F (36 4 °C)   58   21   148/67      100 %   None (Room air)    08/14/20 1430      60   16   136/65   93   100 %       08/14/20 1428   96 6 °F (35 9 °C)Abnormal     58   16   136/65             08/14/20 1346      60   20   153/70      99 %   None (Room air)    08/14/20 1330      66   21   176/71Abnormal     102          08/14/20 1315      64   20   133/76   91          08/14/20 1200      58   21   142/64   92          08/14/20 1125      58   20   134/62      100 %   None (Room air)    08/14/20 1035   97 6 °F (36 4 °C)   73   16   155/68      100 %   None (Room air)        Pertinent Labs/Diagnostic Test Results:   CXR 8/14 -- Numerous nodular density projecting over the lung fields, which could be pulmonary nodules or pleural plaques  There are also multiple suspected sclerotic bone lesions, for example in the inferior border of the right scapula  Recommend chest CT for further evaluation    CT c/a/p 8/14 - Enlarged, irregular contoured prostate gland in keeping with history of prostate cancer, possibly extending into the bladder     Diffuse predominantly sclerotic but also some lytic bone metastases throughout the visualized skeleton  Mild right-sided hydroureteronephrosis  There is an abdominal aortic aneurysm status post bifurcated endovascular stent grafting  Magnolia Ortizax is no evidence of rupture   The aneurysm sac is quite large at 10 2 cm   Possibility endoleak cannot be excluded without IV contrast    Severe emphysema   There are also 2 ground glass density pulmonary nodules, larger a 23 mm right upper lobe nodule   Based on current Fleischner Society 2017 Guidelines on incidental pulmonary nodule, followup noncontrast CT is recommended at 6-12 months   from the initial examination to confirm persistence; if stable at that time, additional followup CT is recommended for every 2 years until 5 years of stability is demonstrated      Results from last 7 days   Lab Units 08/15/20  1619 08/15/20  0444 08/14/20  2239 08/14/20  1104   WBC Thousand/uL 6 60 6 33  --  5 20   HEMOGLOBIN g/dL 7 9* 7 2* 7 1* 5 1*   HEMATOCRIT % 27 2* 24 0* 23 3* 19 0*   PLATELETS Thousands/uL 216 212  --  219   NEUTROS ABS Thousands/µL 4 24  --   --  3 61     Results from last 7 days   Lab Units 08/15/20  0444 08/14/20  1104   SODIUM mmol/L 142 140   POTASSIUM mmol/L 4 6 4 8   CHLORIDE mmol/L 113* 110*   CO2 mmol/L 25 26   ANION GAP mmol/L 4 4   BUN mg/dL 41* 46*   CREATININE mg/dL 1 89* 2 21*   EGFR ml/min/1 73sq m 31 26   CALCIUM mg/dL 9 9 9 6   MAGNESIUM mg/dL 2 2  --    PHOSPHORUS mg/dL 3 1  --      Results from last 7 days   Lab Units 08/14/20  1104   AST U/L 35   ALT U/L 14   ALK PHOS U/L 196*   TOTAL PROTEIN g/dL 7 8   ALBUMIN g/dL 3 2*   TOTAL BILIRUBIN mg/dL 0 13*     Results from last 7 days   Lab Units 08/15/20  0444 08/14/20  1104   GLUCOSE RANDOM mg/dL 80 167*     Results from last 7 days   Lab Units 08/14/20  1104   CK TOTAL U/L 73     Results from last 7 days   Lab Units 08/14/20  1104   TROPONIN I ng/mL <0 02     Results from last 7 days   Lab Units 08/14/20  1104   TSH [address] [place] [address] uIU/mL 2 410     Results from last 7 days   Lab Units 08/14/20  1104   NT-PRO BNP pg/mL 1,915*     Results from last 7 days   Lab Units 08/15/20  0444   FERRITIN ng/mL 40         ED Treatment:   Medication Administration from 08/14/2020 1023 to 08/14/2020 1518     None        Past Medical History:   Diagnosis Date    Kidney disease     Prostate cancer ([address] )      Present on Admission:  **None**      Admitting Diagnosis: Fatigue [R53 83]  Weakness [R53 1]  Anemia [D64 9]  Weight loss [R63 4]  Pulmonary nodules [R91 8]  CKD (chronic kidney disease) [N18 9]  History of prostate cancer [Z85 46]  Age/Sex: 80 y o  male  Admission Orders:  Scheduled Medications:  amLODIPine, 5 mg, Oral, Daily  atorvastatin, 40 mg, Oral, Daily  bicalutamide, 50 mg, Oral, Daily  diltiazem, 120 mg, Oral, Daily         PRN Meds:  acetaminophen, 650 mg, Oral, Q6H PRN 8/14 x1, 8/15 x1      Network Utilization Review Department  [email or web address]  ATTENTION: Please call with any questions or concerns to [phone] and carefully listen to the prompts so that you are directed to the right person  All voicemails are confidential   Sudie Crigler all requests for admission clinical reviews, approved or denied determinations and any other requests to dedicated fax number below belonging to the campus where the patient is receiving treatment   List of dedicated fax numbers for the Facilities:  FACILITY NAME UR FAX NUMBER   ADMISSION DENIALS (Administrative/Medical Necessity) [phone]   [address]  (Maternity/NICU/Pediatrics) [phone]   [facility] [phone]   Beba Sheets [phone]   [address] [facility] [phone] [facility] [address] [facility] [phone] [facility]:  [facility]

## 2023-11-21 NOTE — ASSESSMENT & PLAN NOTE
Continue atorvastatin Not seen IN OFFICE in over a year  Patient notified 2 mo ago needed to make appt  Please contact pt  Cannot refill meds further without in office care